# Patient Record
Sex: FEMALE | Race: WHITE | Employment: UNEMPLOYED | URBAN - METROPOLITAN AREA
[De-identification: names, ages, dates, MRNs, and addresses within clinical notes are randomized per-mention and may not be internally consistent; named-entity substitution may affect disease eponyms.]

---

## 2014-10-23 LAB — COLONOSCOPY, EXTERNAL: NORMAL

## 2017-01-01 DIAGNOSIS — I10 UNSPECIFIED ESSENTIAL HYPERTENSION: ICD-10-CM

## 2017-01-06 RX ORDER — HYDROCHLOROTHIAZIDE 25 MG/1
TABLET ORAL
Qty: 90 TAB | Refills: 0 | Status: SHIPPED | OUTPATIENT
Start: 2017-01-06 | End: 2017-04-06 | Stop reason: SDUPTHER

## 2017-01-06 RX ORDER — LOSARTAN POTASSIUM 50 MG/1
TABLET ORAL
Qty: 90 TAB | Refills: 0 | Status: SHIPPED | OUTPATIENT
Start: 2017-01-06 | End: 2017-04-06 | Stop reason: SDUPTHER

## 2017-02-08 ENCOUNTER — OFFICE VISIT (OUTPATIENT)
Dept: FAMILY MEDICINE CLINIC | Age: 62
End: 2017-02-08

## 2017-02-08 VITALS
HEIGHT: 66 IN | WEIGHT: 200.8 LBS | TEMPERATURE: 99.6 F | BODY MASS INDEX: 32.27 KG/M2 | OXYGEN SATURATION: 96 % | RESPIRATION RATE: 17 BRPM | HEART RATE: 74 BPM | SYSTOLIC BLOOD PRESSURE: 134 MMHG | DIASTOLIC BLOOD PRESSURE: 84 MMHG

## 2017-02-08 DIAGNOSIS — Z11.59 SCREENING FOR VIRAL DISEASE: ICD-10-CM

## 2017-02-08 DIAGNOSIS — R73.03 PREDIABETES: Primary | ICD-10-CM

## 2017-02-08 DIAGNOSIS — E66.9 OBESITY (BMI 30-39.9): ICD-10-CM

## 2017-02-08 DIAGNOSIS — G47.9 SLEEP DISTURBANCE: ICD-10-CM

## 2017-02-08 DIAGNOSIS — E78.5 HYPERLIPIDEMIA WITH TARGET LDL LESS THAN 130: ICD-10-CM

## 2017-02-08 RX ORDER — ALPRAZOLAM 0.25 MG/1
TABLET ORAL
Qty: 30 TAB | Refills: 0 | Status: SHIPPED | OUTPATIENT
Start: 2017-02-08 | End: 2017-09-18 | Stop reason: SDUPTHER

## 2017-02-08 RX ORDER — ZOSTER VACCINE LIVE 19400 [PFU]/.65ML
INJECTION, POWDER, LYOPHILIZED, FOR SUSPENSION SUBCUTANEOUS
Refills: 0 | COMMUNITY
Start: 2016-12-10 | End: 2017-06-15

## 2017-02-08 NOTE — PROGRESS NOTES
HISTORY OF PRESENT ILLNESS  Steve Da Silva is a 64 y.o. female. Blood pressure 134/84, pulse 74, temperature 99.6 °F (37.6 °C), temperature source Oral, resp. rate 17, height 5' 6\" (1.676 m), weight 200 lb 12.8 oz (91.1 kg), SpO2 96 %. Body mass index is 32.41 kg/(m^2). Chief Complaint   Patient presents with    Labs    Cholesterol Problem    Hypertension    Medication Refill     xanax requesting 30 tablets      HPI   Cephus Cooler 64 y.o. female  presents to the office today for a follow up on chronic conditions. Hypertension: Bp at office today 141/88, 134/84 with manual arm cuff recheck. Pt continues with Losartan 50 mg nightly and HCTZ 25 mg daily. Bp control stable, continue current regimen. Hyperlipidemia: Lipid panel on 09/20/16 notable for total cholesterol 246, , HDL 96, and triglycerides 75. Pt is not on any statins. LDL is elevated at 135 per labs on 09/20/16. Will reassess levels when pt returns for fasting labs next week. Prediabetes: Stable with last A1c 6.1% per labs on 09/20/16. Will reassess A1c when pt returns for fasting labs next week. Health maintenance: Counseled pt on diet, exercise, and to work on losing weight. Current Outpatient Prescriptions   Medication Sig Dispense Refill    ALPRAZolam (XANAX) 0.25 mg tablet take 1 tablet by mouth once daily if needed for anxiety 30 Tab 0    hydroCHLOROthiazide (HYDRODIURIL) 25 mg tablet TAKE 1 TABLET BY MOUTH EVERY DAY 90 Tab 0    losartan (COZAAR) 50 mg tablet TAKE 1 TABLET BY MOUTH EVERY EVENING 90 Tab 0    biotin 2,500 mcg tab Take  by mouth.  triamcinolone (NASACORT AQ) 55 mcg nasal inhaler 2 Sprays by Both Nostrils route daily.  aspirin 81 mg chewable tablet Take 1 Tab by mouth daily.  omeprazole (PRILOSEC OTC) 20 mg tablet Take 20 mg by mouth daily.  cyanocobalamin (VITAMIN B-12) 1,000 mcg tablet Take 1,000 mcg by mouth daily.         MULTIVITS-MIN/FA/CA CARB/VIT K (ONE-A-DAY WOMEN'S 50+ PO) Take 1 Tab by mouth daily.  loratadine (CLARITIN) 10 mg tablet Take 10 mg by mouth daily.  VARICELLA-ZOSTER VACINE LIVE 19,400 unit/0.65 mL susr injection TO BE ADMINISTERED BY PHARMACIST FOR IMMUNIZATION  0     No Known Allergies  Past Medical History   Diagnosis Date    Allergic rhinitis     Arrhythmia     Cancer ([address] 6/2010     right breast DCIS    Diverticulitis     Hypertension     Retinal tear of left eye 2/6/14     Past Surgical History   Procedure Laterality Date    Hx tonsillectomy      Hx tonsil and adenoidectomy      Hx breast lumpectomy  4/2010     right breast    Hx other surgical Left 2/6/14      Retinal tear repair by Dr. Marisa Mackenzie     Family History   Problem Relation Age of Onset    Other Mother      overweight, gout    Heart Disease Mother      chf    Hypertension Mother     Glaucoma Father     Diabetes Father     Hypertension Father     Osteoporosis Paternal Grandmother     Diabetes Paternal Aunt 36     breast cancer     Social History   Substance Use Topics    Smoking status: Never Smoker    Smokeless tobacco: Never Used    Alcohol use No        Review of Systems   Constitutional: Negative. Negative for malaise/fatigue. Eyes: Negative for blurred vision. Respiratory: Negative for shortness of breath. Cardiovascular: Negative for chest pain and leg swelling. Musculoskeletal: Negative. Neurological: Negative. Negative for dizziness and headaches. All other systems reviewed and are negative. Physical Exam   Constitutional: She is oriented to person, place, and time. She appears well-developed and well-nourished. No distress. HENT:   Head: Normocephalic and atraumatic. Neck: Carotid bruit is not present. Cardiovascular: Normal rate, regular rhythm, normal heart sounds and intact distal pulses. Exam reveals no gallop and no friction rub. No murmur heard.   Pulmonary/Chest: Effort normal and breath sounds normal. No respiratory distress. She has no wheezes. She has no rales. Musculoskeletal: She exhibits no edema. Neurological: She is alert and oriented to person, place, and time. Skin: She is not diaphoretic. Psychiatric: She has a normal mood and affect. Her behavior is normal. Judgment and thought content normal.   Nursing note and vitals reviewed. ASSESSMENT and PLAN  Solange Gracia was seen today for labs, cholesterol problem, hypertension and medication refill. Diagnoses and all orders for this visit:    Prediabetes  -     METABOLIC PANEL, COMPREHENSIVE  - Stable with last A1c 6.1% on 09/20/16. Will reassess A1c when pt returns for fasting labs. Sleep disturbance  -     ALPRAZolam (XANAX) 0.25 mg tablet; take 1 tablet by mouth once daily if needed for anxiety    Hyperlipidemia with target LDL less than 130  -     LIPID PANEL  -     HEMOGLOBIN A1C WITH EAG  - Presumed stable, will assess levels when pt returns for fasting labs next week. Screening for viral disease  -     HEPATITIS C AB    Obesity (BMI 30-39. 9)  I have reviewed/discussed the above normal BMI with the patient. I have recommended the following interventions: dietary management education, guidance, and counseling, dietary needs education, encourage exercise, feeding regime, lifestyle education regarding diet and monitor weight . The plan is as follows: I have counseled this patient on diet and exercise regimens. .        Follow-up Disposition:  Return in about 6 months (around 8/8/2017) for hypertension follow up, hyperlipidemia follow up. Medication risks/benefits/costs/interactions/alternatives discussed with patient. Advised patient to call back or return to office if symptoms worsen/change/persist.  If patient cannot reach us or should anything more severe/urgent arise he/she should proceed directly to the nearest emergency department. Discussed expected course/resolution/complications of diagnosis in detail with patient.   Patient given a written after visit summary which includes her diagnoses, current medications and vitals. Patient expressed understanding with the diagnosis and plan. Written by talia Mcpherson, as dictated by Xiomara Cole M.D.    I have reviewed and agree with the above note and have made corrections where appropriate, Dr. Chelsie Mueller MD

## 2017-02-08 NOTE — PROGRESS NOTES
Chief Complaint   Patient presents with    Labs    Cholesterol Problem    Hypertension    Medication Refill     xanax requesting 30 tablets     Identified pt with two pt identifiers (Name @ )    1. Have you been to the ER, urgent care clinic since your last visit? Hospitalized since your last visit? No    2. Have you seen or consulted any other health care providers outside of the [address] [place] [address] since your last visit? Include any pap smears or colon screening.  No     \"REVIEWED RECORD IN PREPARATION FOR VISIT AND HAVE OBTAINED NECESSARY DOCUMENTATION\"

## 2017-02-08 NOTE — MR AVS SNAPSHOT
Visit Information Date & Time Provider Department Dept. Phone Encounter #  
 2/8/2017  3:00 PM Princess Tripp  [facility] [address] [phone] [id number] Follow-up Instructions Return in about 6 months (around 8/8/2017) for hypertension follow up, hyperlipidemia follow up. Upcoming Health Maintenance Date Due Hepatitis C Screening 1955 Pneumococcal 19-64 Highest Risk (1 of 3 - PCV13) 2/14/1974 PAP AKA CERVICAL CYTOLOGY 3/12/2017 BREAST CANCER SCRN MAMMOGRAM 5/13/2018 COLONOSCOPY 10/23/2024 DTaP/Tdap/Td series (2 - Td) 1/29/2025 Allergies as of 2/8/2017  Review Complete On: 2/8/2017 By: Princess Tripp MD  
 No Known Allergies Current Immunizations  Reviewed on 2/8/2017 Name Date Influenza Vaccine 10/12/2015 Influenza Vaccine Tomer Me) 10/5/2016 Influenza Vaccine PF 10/22/2013 10:15 AM  
 Tdap 1/29/2015 11:10 AM  
 Zoster Vaccine, Live 12/10/2016 Reviewed by Gauri Alcantara LPN on 6/3/8666 at  1:50 PM  
 Reviewed by Gauri Alcantara LPN on 5/7/6071 at  7:78 PM  
You Were Diagnosed With   
  
 Codes Comments Prediabetes    -  Primary ICD-10-CM: R73.03 
ICD-9-CM: 790.29 Sleep disturbance     ICD-10-CM: G47.9 ICD-9-CM: 780.50 Hyperlipidemia with target LDL less than 130     ICD-10-CM: E78.5 ICD-9-CM: 272.4 Screening for viral disease     ICD-10-CM: Z11.59 
ICD-9-CM: V73.99 Obesity (BMI 30-39. 9)     ICD-10-CM: E66.9 ICD-9-CM: 278.00 Vitals BP Pulse Temp Resp Height(growth percentile) Weight(growth percentile) 134/84 74 99.6 °F (37.6 °C) (Oral) 17 5' 6\" (1.676 m) 200 lb 12.8 oz (91.1 kg) SpO2 BMI OB Status Smoking Status 96% 32.41 kg/m2 Postmenopausal Never Smoker Vitals History BMI and BSA Data Body Mass Index Body Surface Area  
 32.41 kg/m 2 2.06 m 2 Preferred Pharmacy Pharmacy Name Phone [id number]- [place], [address] [facility] [phone] Your Updated Medication List  
  
   
This list is accurate as of: 2/8/17  3:51 PM.  Always use your most recent med list.  
  
  
  
  
 ALPRAZolam 0.25 mg tablet Commonly known as:  XANAX  
take 1 tablet by mouth once daily if needed for anxiety  
  
 aspirin 81 mg chewable tablet Take 1 Tab by mouth daily. biotin 2,500 mcg Tab Take  by mouth. CLARITIN 10 mg tablet Generic drug:  loratadine Take 10 mg by mouth daily. hydroCHLOROthiazide 25 mg tablet Commonly known as:  HYDRODIURIL  
TAKE 1 TABLET BY MOUTH EVERY DAY  
  
 losartan 50 mg tablet Commonly known as:  COZAAR  
TAKE 1 TABLET BY MOUTH EVERY EVENING  
  
 NASACORT AQ 55 mcg nasal inhaler Generic drug:  triamcinolone 2 Sprays by Both Nostrils route daily. ONE-A-DAY WOMEN'S 50+ PO Take 1 Tab by mouth daily. PriLOSEC OTC 20 mg tablet Generic drug:  omeprazole Take 20 mg by mouth daily. varicella-zoster vacine live 19,400 unit/0.65 mL Susr injection Generic drug:  varicella zoster vacine live  
TO BE ADMINISTERED BY PHARMACIST FOR IMMUNIZATION  
  
 VITAMIN B-12 1,000 mcg tablet Generic drug:  cyanocobalamin Take 1,000 mcg by mouth daily. Prescriptions Printed Refills ALPRAZolam (XANAX) 0.25 mg tablet 0 Sig: take 1 tablet by mouth once daily if needed for anxiety Class: Print We Performed the Following HEMOGLOBIN A1C WITH EAG [37320 CPT(R)] HEPATITIS C AB [65762 CPT(R)] LIPID PANEL [08466 CPT(R)] METABOLIC PANEL, COMPREHENSIVE [79929 CPT(R)] Follow-up Instructions Return in about 6 months (around 8/8/2017) for hypertension follow up, hyperlipidemia follow up. Introducing [address]! Dear Mahendra Funes: Thank you for requesting a [employer] account.   Our records indicate that you already have an active [facility] account. You can access your account anytime at [email or web address] Did you know that you can access your hospital and ER discharge instructions at any time in [employer]MA? You can also review all of your test results from your hospital stay or ER visit. Additional Information If you have questions, please visit the Frequently Asked Questions section of the [employer] website at [email or web address] Remember, [facility] is NOT to be used for urgent needs. For medical emergencies, dial 911. Now available from your iPhone and Android! Please provide this summary of care documentation to your next provider. Your primary care clinician is listed as Selina Button. If you have any questions after today's visit, please call [phone].

## 2017-02-14 LAB
ALBUMIN SERPL-MCNC: 4.4 G/DL (ref 3.6–4.8)
ALBUMIN/GLOB SERPL: 1.8 {RATIO} (ref 1.1–2.5)
ALP SERPL-CCNC: 114 IU/L (ref 39–117)
ALT SERPL-CCNC: 23 IU/L (ref 0–32)
AST SERPL-CCNC: 22 IU/L (ref 0–40)
BILIRUB SERPL-MCNC: 0.4 MG/DL (ref 0–1.2)
BUN SERPL-MCNC: 18 MG/DL (ref 8–27)
BUN/CREAT SERPL: 22 (ref 11–26)
CALCIUM SERPL-MCNC: 9.7 MG/DL (ref 8.7–10.3)
CHLORIDE SERPL-SCNC: 99 MMOL/L (ref 96–106)
CHOLEST SERPL-MCNC: 231 MG/DL (ref 100–199)
CO2 SERPL-SCNC: 27 MMOL/L (ref 18–29)
CREAT SERPL-MCNC: 0.82 MG/DL (ref 0.57–1)
EST. AVERAGE GLUCOSE BLD GHB EST-MCNC: 128 MG/DL
GLOBULIN SER CALC-MCNC: 2.5 G/DL (ref 1.5–4.5)
GLUCOSE SERPL-MCNC: 99 MG/DL (ref 65–99)
HBA1C MFR BLD: 6.1 % (ref 4.8–5.6)
HCV AB S/CO SERPL IA: <0.1 S/CO RATIO (ref 0–0.9)
HDLC SERPL-MCNC: 81 MG/DL
INTERPRETATION, 910389: NORMAL
LDLC SERPL CALC-MCNC: 136 MG/DL (ref 0–99)
POTASSIUM SERPL-SCNC: 5.3 MMOL/L (ref 3.5–5.2)
PROT SERPL-MCNC: 6.9 G/DL (ref 6–8.5)
SODIUM SERPL-SCNC: 142 MMOL/L (ref 134–144)
TRIGL SERPL-MCNC: 69 MG/DL (ref 0–149)
VLDLC SERPL CALC-MCNC: 14 MG/DL (ref 5–40)

## 2017-02-16 NOTE — PROGRESS NOTES
Inform pt to go to my chart to see results and recommendations    Cholesterol and A1C are stable  Continue current regimen  Keep up the good work, an I'll see you as advised

## 2017-04-06 DIAGNOSIS — I10 UNSPECIFIED ESSENTIAL HYPERTENSION: ICD-10-CM

## 2017-04-08 RX ORDER — HYDROCHLOROTHIAZIDE 25 MG/1
TABLET ORAL
Qty: 90 TAB | Refills: 1 | Status: SHIPPED | OUTPATIENT
Start: 2017-04-08 | End: 2017-10-02 | Stop reason: SDUPTHER

## 2017-04-08 RX ORDER — LOSARTAN POTASSIUM 50 MG/1
TABLET ORAL
Qty: 90 TAB | Refills: 1 | Status: SHIPPED | OUTPATIENT
Start: 2017-04-08 | End: 2017-09-18 | Stop reason: SDUPTHER

## 2017-04-17 LAB
MAMMOGRAPHY, EXTERNAL: NORMAL
MAMMOGRAPHY, EXTERNAL: NORMAL
PAP SMEAR, EXTERNAL: NORMAL

## 2017-05-08 ENCOUNTER — HOSPITAL ENCOUNTER (OUTPATIENT)
Dept: MAMMOGRAPHY | Age: 62
Discharge: HOME | End: 2017-05-08
Attending: OBSTETRICS & GYNECOLOGY
Payer: COMMERCIAL

## 2017-05-08 DIAGNOSIS — Z85.3 HISTORY OF BREAST CANCER: ICD-10-CM

## 2017-05-08 DIAGNOSIS — N64.59 BREAST THICKENING: ICD-10-CM

## 2017-05-08 PROCEDURE — 77066 DX MAMMO INCL CAD BI: CPT

## 2017-06-15 ENCOUNTER — OFFICE VISIT (OUTPATIENT)
Dept: FAMILY MEDICINE CLINIC | Age: 62
End: 2017-06-15

## 2017-06-15 VITALS
RESPIRATION RATE: 15 BRPM | SYSTOLIC BLOOD PRESSURE: 130 MMHG | HEART RATE: 80 BPM | TEMPERATURE: 98.4 F | HEIGHT: 66 IN | WEIGHT: 198 LBS | OXYGEN SATURATION: 95 % | BODY MASS INDEX: 31.82 KG/M2 | DIASTOLIC BLOOD PRESSURE: 88 MMHG

## 2017-06-15 DIAGNOSIS — I49.9 IRREGULAR HEARTBEAT: ICD-10-CM

## 2017-06-15 DIAGNOSIS — J20.8 VIRAL BRONCHITIS: Primary | ICD-10-CM

## 2017-06-15 RX ORDER — AZITHROMYCIN 250 MG/1
TABLET, FILM COATED ORAL
Qty: 6 TAB | Refills: 0 | Status: SHIPPED | OUTPATIENT
Start: 2017-06-15 | End: 2017-06-20

## 2017-06-15 RX ORDER — HYDROCODONE POLISTIREX AND CHLORPHENIRAMINE POLISTIREX 10; 8 MG/5ML; MG/5ML
1 SUSPENSION, EXTENDED RELEASE ORAL
Qty: 120 ML | Refills: 0 | Status: SHIPPED | OUTPATIENT
Start: 2017-06-15 | End: 2017-09-18

## 2017-06-15 RX ORDER — BENZONATATE 100 MG/1
100 CAPSULE ORAL
Qty: 30 CAP | Refills: 0 | Status: SHIPPED | OUTPATIENT
Start: 2017-06-15 | End: 2017-06-25

## 2017-06-15 NOTE — PROGRESS NOTES
Chief Complaint   Patient presents with    Cough       1. Have you been to the ER, urgent care clinic since your last visit? Hospitalized since your last visit? No    2. Have you seen or consulted any other health care providers outside of the [address] [place] [address] since your last visit? Include any pap smears or colon screening. No    Body mass index is 31.96 kg/(m^2).

## 2017-06-15 NOTE — MR AVS SNAPSHOT
Visit Information Date & Time Provider Department Dept. Phone Encounter #  
 6/15/2017  3:30 PM Christiano Echeverria, [address] [facility] [phone] [id number] Your Appointments 8/9/2017  2:30 PM  
ROUTINE CARE with Leroy Byrne MD  
[facility]) Appt Note: 6 months follow up visit htn  
 [address] [address]  
[phone]  
  
   
 [address] [address] Upcoming Health Maintenance Date Due Pneumococcal 19-64 Highest Risk (1 of 3 - PCV13) 2/14/1974 PAP AKA CERVICAL CYTOLOGY 3/12/2017 INFLUENZA AGE 9 TO ADULT 8/1/2017 BREAST CANCER SCRN MAMMOGRAM 5/8/2019 COLONOSCOPY 10/23/2024 DTaP/Tdap/Td series (2 - Td) 1/29/2025 Allergies as of 6/15/2017  Review Complete On: 6/15/2017 By: Christiano Echeverria MD  
 No Known Allergies Current Immunizations  Reviewed on 2/8/2017 Name Date Influenza Vaccine 10/12/2015 Influenza Vaccine Iver Ren) 10/5/2016 Influenza Vaccine PF 10/22/2013 10:15 AM  
 Tdap 1/29/2015 11:10 AM  
 Zoster Vaccine, Live 12/10/2016 Not reviewed this visit You Were Diagnosed With   
  
 Codes Comments Viral bronchitis    -  Primary ICD-10-CM: J20.8 ICD-9-CM: 466.0 Irregular heartbeat     ICD-10-CM: I49.9 ICD-9-CM: 427.9 Vitals BP Pulse Temp Resp Height(growth percentile) Weight(growth percentile) 130/88 80 98.4 °F (36.9 °C) (Oral) 15 5' 6\" (1.676 m) 198 lb (89.8 kg) SpO2 BMI OB Status Smoking Status 95% 31.96 kg/m2 Postmenopausal Never Smoker Vitals History BMI and BSA Data Body Mass Index Body Surface Area 31.96 kg/m 2 2.04 m 2 Preferred Pharmacy Pharmacy Name Phone [id number] [place], [address] [phone] Your Updated Medication List  
  
   
 This list is accurate as of: 6/15/17  3:30 PM.  Always use your most recent med list.  
  
  
  
  
 ALPRAZolam 0.25 mg tablet Commonly known as:  XANAX  
take 1 tablet by mouth once daily if needed for anxiety  
  
 aspirin 81 mg chewable tablet Take 1 Tab by mouth daily. azithromycin 250 mg tablet Commonly known as:  Dilan Russ Take 2 tablets today, then take 1 tablet daily (expires 8/1/17) benzonatate 100 mg capsule Commonly known as:  TESSALON Take 1 Cap by mouth three (3) times daily as needed for Cough for up to 10 days. biotin 2,500 mcg Tab Take  by mouth. chlorpheniramine-HYDROcodone 10-8 mg/5 mL suspension Commonly known as:  Bro Martines Take 5 mL by mouth every twelve (12) hours as needed for Cough. Max Daily Amount: 10 mL. CLARITIN 10 mg tablet Generic drug:  loratadine Take 10 mg by mouth daily. hydroCHLOROthiazide 25 mg tablet Commonly known as:  HYDRODIURIL  
TAKE 1 TABLET BY MOUTH EVERY DAY  
  
 losartan 50 mg tablet Commonly known as:  COZAAR  
TAKE 1 TABLET BY MOUTH EVERY EVENING  
  
 NASACORT AQ 55 mcg nasal inhaler Generic drug:  triamcinolone 2 Sprays by Both Nostrils route daily. ONE-A-DAY WOMEN'S 50+ PO Take 1 Tab by mouth daily. PriLOSEC OTC 20 mg tablet Generic drug:  omeprazole Take 20 mg by mouth daily. VITAMIN B-12 1,000 mcg tablet Generic drug:  cyanocobalamin Take 1,000 mcg by mouth daily. Prescriptions Printed Refills  
 chlorpheniramine-HYDROcodone (TUSSIONEX) 10-8 mg/5 mL suspension 0 Sig: Take 5 mL by mouth every twelve (12) hours as needed for Cough. Max Daily Amount: 10 mL. Class: Print Route: Oral  
 azithromycin (ZITHROMAX) 250 mg tablet 0 Sig: Take 2 tablets today, then take 1 tablet daily (expires 8/1/17) Class: Print Prescriptions Sent to Pharmacy  Refills  
 benzonatate (TESSALON) 100 mg capsule 0  
 Sig: Take 1 Cap by mouth three (3) times daily as needed for Cough for up to 10 days. Class: Normal  
 Pharmacy[id number] [place], [address] [place] [address] Ph #: [phone] Route: Oral  
  
We Performed the Following AMB POC EKG ROUTINE W/ 12 LEADS, INTER & REP [72279 CPT(R)] Introducing [facility]! Dear Selina Amaral: Thank you for requesting a [employer] account. Our records indicate that you already have an active [employer] account. You can access your account anytime at [email or web address] Did you know that you can access your hospital and ER discharge instructions at any time in [employer] [employer]? You can also review all of your test results from your hospital stay or ER visit. Additional Information If you have questions, please visit the Frequently Asked Questions section of the [employer] website at [email or web address] Remember, [facility] is NOT to be used for urgent needs. For medical emergencies, dial 911. Now available from your iPhone and Android! Please provide this summary of care documentation to your next provider. Your primary care clinician is listed as Altagracia Ambrocio. If you have any questions after today's visit, please call [phone].

## 2017-06-15 NOTE — PROGRESS NOTES
Alex Rose [address] [facility]  [address]. [facility], [address]  [phone]             Date of visit: 6/15/2017   Subjective:      History obtained from:  the patient. Stephanie Araujo is a 58 y.o. female who presents today for cold last week and still has the cough, it won't go away, keeps her up at night    This AM was short of breath when getting dressed  Feeling hot, tired      Sometimes coughs up stuff  Not blowing much out nose  Did have a copule of days ago headache and sinus pain    This is all unusual for her, has not had a cold in years nad doesn't get coughs like this  No history of asthma or lung problems or smoke exposure    Works at a [employer] to [employer] Saturday for next week    Patient Active Problem List    Diagnosis Date Noted    Prediabetes 10/05/2016    Sleep disturbance 01/29/2015    Obesity (BMI 30-39.9) 01/29/2015    Breast neoplasm, Tis (LCIS) 06/05/2014    DCIS (ductal carcinoma in situ) of breast 06/05/2014    Use of tamoxifen (Nolvadex) 06/05/2014    S/P breast lumpectomy 06/05/2013    GERD (gastroesophageal reflux disease) 03/19/2013    Unspecified essential hypertension 11/19/2010    Breast cancer ([address] 07/10/2010    Allergic rhinitis 03/31/2010    Hyperlipidemia with target LDL less than 130 03/31/2010    Family history of osteoporosis 03/31/2010     Current Outpatient Prescriptions   Medication Sig Dispense Refill    chlorpheniramine-HYDROcodone (TUSSIONEX) 10-8 mg/5 mL suspension Take 5 mL by mouth every twelve (12) hours as needed for Cough. Max Daily Amount: 10 mL. 120 mL 0    benzonatate (TESSALON) 100 mg capsule Take 1 Cap by mouth three (3) times daily as needed for Cough for up to 10 days.  30 Cap 0    azithromycin (ZITHROMAX) 250 mg tablet Take 2 tablets today, then take 1 tablet daily (expires 8/1/17) 6 Tab 0    hydroCHLOROthiazide (HYDRODIURIL) 25 mg tablet TAKE 1 TABLET BY MOUTH EVERY DAY 90 Tab 1    losartan (COZAAR) 50 mg tablet TAKE 1 TABLET BY MOUTH EVERY EVENING 90 Tab 1    ALPRAZolam (XANAX) 0.25 mg tablet take 1 tablet by mouth once daily if needed for anxiety 30 Tab 0    biotin 2,500 mcg tab Take  by mouth.  triamcinolone (NASACORT AQ) 55 mcg nasal inhaler 2 Sprays by Both Nostrils route daily.  aspirin 81 mg chewable tablet Take 1 Tab by mouth daily.  omeprazole (PRILOSEC OTC) 20 mg tablet Take 20 mg by mouth daily.  cyanocobalamin (VITAMIN B-12) 1,000 mcg tablet Take 1,000 mcg by mouth daily.  MULTIVITS-MIN/FA/CA CARB/VIT K (ONE-A-DAY WOMEN'S 50+ PO) Take 1 Tab by mouth daily.  loratadine (CLARITIN) 10 mg tablet Take 10 mg by mouth daily.        No Known Allergies  Past Medical History:   Diagnosis Date    Allergic rhinitis     Arrhythmia     Breast [address] [address] 2010    right breast    Cancer ([facility] [address] [facility] 6/2010    right breast DCIS    Diverticulitis     Hypertension     Retinal tear of left eye 2/6/14     Past Surgical History:   Procedure Laterality Date    HX BREAST BIOPSY Bilateral 1970 70's and 2010 benign (surgical)    HX BREAST BIOPSY Left 05/2010    benign mri bx    HX BREAST BIOPSY Right 03/2010    positive stereo    HX BREAST LUMPECTOMY  4/2010    right breast    HX OTHER SURGICAL Left 2/6/14     Retinal tear repair by Dr. Briseida Gillespie HX TONSILLECTOMY       Family History   Problem Relation Age of Onset    Other Mother      overweight, gout    Heart Disease Mother      chf    Hypertension Mother     Glaucoma Father     Diabetes Father     Hypertension Father     Osteoporosis Paternal Grandmother     Diabetes Paternal Aunt 36     breast cancer     Social History   Substance Use Topics    Smoking status: Never Smoker    Smokeless tobacco: Never Used    Alcohol use No      Social History     Social History Narrative        Review of Systems  Card: denies chest pain  Gen: denies fevers     Objective:     Vitals:    06/15/17 1512 06/15/17 1526   BP: (!) 151/103 130/88   Pulse: 80    Resp: 15    Temp: 98.4 °F (36.9 °C)    TempSrc: Oral    SpO2: 95%    Weight: 198 lb (89.8 kg)    Height: 5' 6\" (1.676 m)      Body mass index is 31.96 kg/(m^2). General: stated age, obese and in NAD  Nose: no drainage  Throat: no redness or exudate  Neck: supple, symmetrical, trachea midline, no adenopathy and thyroid: not enlarged, symmetric, no tenderness/mass/nodules  Lungs:  clear to auscultation w/o rales, rhonchi, wheezes w/normal effort and no use of accessory muscles of respiration   Heart: irregularly irregular but not fast, S1, S2 normal, no murmur, click, rub or gallop  Abdomen: soft, nontender, no masses, BS normal  Ext:  No edema noted. Lymph: no cervical adenopathy appreciated  Skin:  Normal. and no rash or abnormalities   Psych: alert and oriented to person, place, time and situation and Speech: appropriate quality, quantity and organization of sentences    EKG: SR, few PVCs, no prior to compare    Assessment/Plan:       ICD-10-CM ICD-9-CM    1. Viral bronchitis J20.8 466.0    2. Irregular heartbeat I49.9 427.9 AMB POC EKG ROUTINE W/ 12 LEADS, INTER & REP        Orders Placed This Encounter    AMB POC EKG ROUTINE W/ 12 LEADS, INTER & REP    chlorpheniramine-HYDROcodone (TUSSIONEX) 10-8 mg/5 mL suspension    benzonatate (TESSALON) 100 mg capsule    azithromycin (ZITHROMAX) 250 mg tablet       Seems to be viral bronchitis, explained abx won't help  However, will give her printed rx for zpack to take on her vacation next week and should take it if sinus symptoms worsen again or she gets a fever  For now, try tessalon, tussionex prn  EKG reassuring, just PVCs like she has had in the past (wanted to rule out a-fib)    Discussed the diagnosis and plan and she expressed understanding.     F/u prn    Guero Owen MD

## 2017-09-18 ENCOUNTER — OFFICE VISIT (OUTPATIENT)
Dept: FAMILY MEDICINE CLINIC | Age: 62
End: 2017-09-18

## 2017-09-18 VITALS
SYSTOLIC BLOOD PRESSURE: 146 MMHG | RESPIRATION RATE: 16 BRPM | TEMPERATURE: 97.2 F | HEIGHT: 66 IN | HEART RATE: 60 BPM | WEIGHT: 204 LBS | BODY MASS INDEX: 32.78 KG/M2 | DIASTOLIC BLOOD PRESSURE: 90 MMHG | OXYGEN SATURATION: 98 %

## 2017-09-18 DIAGNOSIS — I10 UNSPECIFIED ESSENTIAL HYPERTENSION: Primary | ICD-10-CM

## 2017-09-18 DIAGNOSIS — R73.03 PREDIABETES: ICD-10-CM

## 2017-09-18 DIAGNOSIS — G47.9 SLEEP DISTURBANCE: ICD-10-CM

## 2017-09-18 LAB — HBA1C MFR BLD HPLC: 5.9 %

## 2017-09-18 RX ORDER — LOSARTAN POTASSIUM 50 MG/1
100 TABLET ORAL DAILY
Qty: 90 TAB | Refills: 1
Start: 2017-09-18 | End: 2017-11-30 | Stop reason: SDUPTHER

## 2017-09-18 RX ORDER — ALPRAZOLAM 0.25 MG/1
TABLET ORAL
Qty: 30 TAB | Refills: 0 | Status: CANCELLED | OUTPATIENT
Start: 2017-09-18

## 2017-09-18 RX ORDER — ALPRAZOLAM 0.25 MG/1
TABLET ORAL
Qty: 30 TAB | Refills: 0 | Status: SHIPPED | OUTPATIENT
Start: 2017-09-18 | End: 2018-10-24 | Stop reason: SDUPTHER

## 2017-09-18 NOTE — PROGRESS NOTES
HISTORY OF PRESENT ILLNESS  Aysha Kim is a 58 y.o. female. Blood pressure 146/90, pulse 60, temperature 97.2 °F (36.2 °C), temperature source Oral, resp. rate 16, height 5' 6\" (1.676 m), weight 204 lb (92.5 kg), SpO2 98 %. Body mass index is 32.93 kg/(m^2). Chief Complaint   Patient presents with    Hypertension        HPI  Aysha Kim 58 y.o. female  presents to the office today for hypertension follow up. Hypertension: Bp at office today 146/88-90 by manual arm cuff recheck. Pt continues with HCTZ 25mg daily and Losartan 50mg daily. Pt reports that she has not bee checking her bp at home. Pt denies headaches, dizziness, chest pains, or shortness of breath. Pt states that she has been taking her medications regularly. I advised pt that her bp is not at goal. I will increase pt's dosage of Losartan to 100mg daily. I advised pt to try and get 8000 steps a day and to monitor her bp regularly. Hyperlipidemia: Lipid panel on 02/13/17 notable for total cholesterol 231, , HDL 81, and triglycerides 69. Pt continues with diet and exercise and is not taking any statins. Prediabetes: A1C per POC today was 6.4% elevated from A1C on 02/13/17 of 6.1%. Will continue to monitor. Advised pt to try and exercise more. Sleep Disturbance: Pt reports that she only takes the Alprazolam 0.25mg when she really needs it. Health maintenance: Pt reports that she had her influenza vaccine this past weekend. Pt states that she had her pap smear on 04/17/17 with Dr. Onur Mcdaniel (OBGYN) and a mammogram in May 2017. Current Outpatient Prescriptions   Medication Sig Dispense Refill    ALPRAZolam (XANAX) 0.25 mg tablet take 1 tablet by mouth once daily if needed for anxiety 30 Tab 0    losartan (COZAAR) 50 mg tablet Take 2 Tabs by mouth daily.  Indications: hypertension 90 Tab 1    hydroCHLOROthiazide (HYDRODIURIL) 25 mg tablet TAKE 1 TABLET BY MOUTH EVERY DAY 90 Tab 1    biotin 2,500 mcg tab Take by mouth.  triamcinolone (NASACORT AQ) 55 mcg nasal inhaler 2 Sprays by Both Nostrils route daily.  aspirin 81 mg chewable tablet Take 1 Tab by mouth daily.  omeprazole (PRILOSEC OTC) 20 mg tablet Take 20 mg by mouth daily.  cyanocobalamin (VITAMIN B-12) 1,000 mcg tablet Take 1,000 mcg by mouth daily.  MULTIVITS-MIN/FA/CA CARB/VIT K (ONE-A-DAY WOMEN'S 50+ PO) Take 1 Tab by mouth daily.  loratadine (CLARITIN) 10 mg tablet Take 10 mg by mouth daily. No Known Allergies  Past Medical History:   Diagnosis Date    Allergic rhinitis     Arrhythmia     Breast cancer ([facility] 2010    right breast    Cancer ([facility] 6/2010    right breast DCIS    Diverticulitis     Hypertension     Retinal tear of left eye 2/6/14     Past Surgical History:   Procedure Laterality Date    HX BREAST BIOPSY Bilateral 1970 70's and 2010 benign (surgical)    HX BREAST BIOPSY Left 05/2010    benign mri bx    HX BREAST BIOPSY Right 03/2010    positive stereo    HX BREAST LUMPECTOMY  4/2010    right breast    HX OTHER SURGICAL Left 2/6/14     Retinal tear repair by Dr. Yaron Brasher HX TONSILLECTOMY       Family History   Problem Relation Age of Onset    Other Mother      overweight, gout    Heart Disease Mother      chf    Hypertension Mother     Glaucoma Father     Diabetes Father     Hypertension Father     Osteoporosis Paternal Grandmother     Diabetes Paternal Aunt 36     breast cancer     Social History   Substance Use Topics    Smoking status: Never Smoker    Smokeless tobacco: Never Used    Alcohol use No        Review of Systems   Constitutional: Negative. Negative for malaise/fatigue. Eyes: Negative for blurred vision. Respiratory: Negative for shortness of breath. Cardiovascular: Negative for chest pain and leg swelling. Musculoskeletal: Negative. Neurological: Negative. Negative for dizziness and headaches.    All other systems reviewed and are negative. Physical Exam   Constitutional: She is oriented to person, place, and time. She appears well-developed and well-nourished. No distress. HENT:   Head: Normocephalic and atraumatic. Neck: Carotid bruit is not present. Cardiovascular: Normal rate, regular rhythm, normal heart sounds and intact distal pulses. Exam reveals no gallop and no friction rub. No murmur heard. Pulmonary/Chest: Effort normal and breath sounds normal. No respiratory distress. She has no wheezes. She has no rales. Musculoskeletal: She exhibits no edema. Neurological: She is alert and oriented to person, place, and time. Skin: She is not diaphoretic. Psychiatric: She has a normal mood and affect. Her behavior is normal. Judgment and thought content normal.   Nursing note and vitals reviewed. ASSESSMENT and PLAN  Diagnoses and all orders for this visit:    1. Unspecified essential hypertension  -     losartan (COZAAR) 50 mg tablet; Take 2 Tabs by mouth daily. Indications: hypertension  - I advised pt that her bp is not at goal.   - I will increase pt's dosage of Losartan to 100mg daily.   - I advised pt to try and get 8000 steps a day and to monitor her bp regularly. 2. Sleep disturbance  -     ALPRAZolam (XANAX) 0.25 mg tablet; take 1 tablet by mouth once daily if needed for anxiety  - Advised pt to only take PRN. 3. Prediabetes  -     AMB POC HEMOGLOBIN A1C        - A1C per POC today was 6.4% elevated from A1C on 02/13/17 of 6.1%. - Will continue to monitor. Advised pt to try and exercise more. Follow-up Disposition:  Return in about 1 month (around 10/18/2017) for hypertension follow up. Medication risks/benefits/costs/interactions/alternatives discussed with patient.   Advised patient to call back or return to office if symptoms worsen/change/persist.  If patient cannot reach us or should anything more severe/urgent arise he/she should proceed directly to the nearest emergency department. Discussed expected course/resolution/complications of diagnosis in detail with patient. Patient given a written after visit summary which includes her diagnoses, current medications and vitals. Patient expressed understanding with the diagnosis and plan.   Written by talia Smith, as dictated by, Dr. Santhosh Colindres MD.   I have reviewed and agree with the above note and have made corrections where appropriate, Dr. Santhosh Colindres MD

## 2017-09-18 NOTE — PROGRESS NOTES
Chief Complaint   Patient presents with    Hypertension     States had flu vaccine at [facility] in [place]. Reviewed Record in preparation for visit and have obtained necessary documentation. Identified pt with two pt identifiers (Name @ )    Health Maintenance Due   Topic    Pneumococcal 19-64 Highest Risk (1 of 3 - PCV13)    PAP AKA CERVICAL CYTOLOGY     INFLUENZA AGE 9 TO ADULT          1. Have you been to the ER, urgent care clinic since your last visit? Hospitalized since your last visit? No    2. Have you seen or consulted any other health care providers outside of the [address] [place] [address] since your last visit? Include any pap smears or colon screening.  No

## 2017-10-01 RX ORDER — LOSARTAN POTASSIUM 50 MG/1
TABLET ORAL
Qty: 90 TAB | Refills: 1 | Status: SHIPPED | OUTPATIENT
Start: 2017-10-01 | End: 2017-11-30 | Stop reason: SDUPTHER

## 2017-10-02 DIAGNOSIS — I10 ESSENTIAL HYPERTENSION: ICD-10-CM

## 2017-10-02 RX ORDER — HYDROCHLOROTHIAZIDE 25 MG/1
TABLET ORAL
Qty: 90 TAB | Refills: 1 | Status: SHIPPED | OUTPATIENT
Start: 2017-10-02 | End: 2018-03-26 | Stop reason: SDUPTHER

## 2017-10-16 ENCOUNTER — OFFICE VISIT (OUTPATIENT)
Dept: FAMILY MEDICINE CLINIC | Age: 62
End: 2017-10-16

## 2017-10-16 VITALS
TEMPERATURE: 97.9 F | HEART RATE: 60 BPM | RESPIRATION RATE: 16 BRPM | HEIGHT: 66 IN | SYSTOLIC BLOOD PRESSURE: 136 MMHG | OXYGEN SATURATION: 98 % | WEIGHT: 200.2 LBS | DIASTOLIC BLOOD PRESSURE: 80 MMHG | BODY MASS INDEX: 32.17 KG/M2

## 2017-10-16 DIAGNOSIS — E66.9 OBESITY, CLASS I, BMI 30-34.9: ICD-10-CM

## 2017-10-16 DIAGNOSIS — C50.211 MALIGNANT NEOPLASM OF UPPER-INNER QUADRANT OF RIGHT FEMALE BREAST, UNSPECIFIED ESTROGEN RECEPTOR STATUS (HCC): ICD-10-CM

## 2017-10-16 DIAGNOSIS — R73.03 PREDIABETES: ICD-10-CM

## 2017-10-16 DIAGNOSIS — I10 ESSENTIAL HYPERTENSION: Primary | ICD-10-CM

## 2017-10-16 DIAGNOSIS — E78.5 HYPERLIPIDEMIA WITH TARGET LDL LESS THAN 130: ICD-10-CM

## 2017-10-16 NOTE — ASSESSMENT & PLAN NOTE
Stable, based on history, physical exam and review of pertinent labs, studies and medications; meds reconciled; pt has completed treatment. Key Oncology Meds     Patient is on no Oncologic meds. Key Pain Meds     The patient is on no pain meds. Lab Results   Component Value Date/Time    WBC 10.3 09/20/2016 10:59 AM    ABS.  NEUTROPHILS 6.5 09/20/2016 10:59 AM    HGB 13.8 09/20/2016 10:59 AM    HCT 41.0 09/20/2016 10:59 AM    PLATELET 640 57/14/6682 10:59 AM    Creatinine 0.82 02/13/2017 08:13 AM    BUN 18 02/13/2017 08:13 AM    ALT (SGPT) 23 02/13/2017 08:13 AM    AST (SGOT) 22 02/13/2017 08:13 AM    Albumin 4.4 02/13/2017 08:13 AM

## 2017-10-16 NOTE — PATIENT INSTRUCTIONS
Body Mass Index: Care Instructions  Your Care Instructions    Body mass index (BMI) can help you see if your weight is raising your risk for health problems. It uses a formula to compare how much you weigh with how tall you are. · A BMI lower than 18.5 is considered underweight. · A BMI between 18.5 and 24.9 is considered healthy. · A BMI between 25 and 29.9 is considered overweight. A BMI of 30 or higher is considered obese. If your BMI is in the normal range, it means that you have a lower risk for weight-related health problems. If your BMI is in the overweight or obese range, you may be at increased risk for weight-related health problems, such as high blood pressure, heart disease, stroke, arthritis or joint pain, and diabetes. If your BMI is in the underweight range, you may be at increased risk for health problems such as fatigue, lower protection (immunity) against illness, muscle loss, bone loss, hair loss, and hormone problems. BMI is just one measure of your risk for weight-related health problems. You may be at higher risk for health problems if you are not active, you eat an unhealthy diet, or you drink too much alcohol or use tobacco products. Follow-up care is a key part of your treatment and safety. Be sure to make and go to all appointments, and call your doctor if you are having problems. It's also a good idea to know your test results and keep a list of the medicines you take. How can you care for yourself at home? · Practice healthy eating habits. This includes eating plenty of fruits, vegetables, whole grains, lean protein, and low-fat dairy. · If your doctor recommends it, get more exercise. Walking is a good choice. Bit by bit, increase the amount you walk every day. Try for at least 30 minutes on most days of the week. · Do not smoke. Smoking can increase your risk for health problems. If you need help quitting, talk to your doctor about stop-smoking programs and medicines. These can increase your chances of quitting for good. · Limit alcohol to 2 drinks a day for men and 1 drink a day for women. Too much alcohol can cause health problems. If you have a BMI higher than 25  · Your doctor may do other tests to check your risk for weight-related health problems. This may include measuring the distance around your waist. A waist measurement of more than 40 inches in men or 35 inches in women can increase the risk of weight-related health problems. · Talk with your doctor about steps you can take to stay healthy or improve your health. You may need to make lifestyle changes to lose weight and stay healthy, such as changing your diet and getting regular exercise. If you have a BMI lower than 18.5  · Your doctor may do other tests to check your risk for health problems. · Talk with your doctor about steps you can take to stay healthy or improve your health. You may need to make lifestyle changes to gain or maintain weight and stay healthy, such as getting more healthy foods in your diet and doing exercises to build muscle. Where can you learn more? Go to http://brandi-lise.info/. Enter S176 in the search box to learn more about \"Body Mass Index: Care Instructions. \"  Current as of: January 23, 2017  Content Version: 11.3  © 1965-6604 [employer]. Care instructions adapted under license by [employer] (which disclaims liability or warranty for this information). If you have questions about a medical condition or this instruction, always ask your healthcare professional. [place] [address] any warranty or liability for your use of this information.

## 2017-10-16 NOTE — MR AVS SNAPSHOT
Visit Information Date & Time Provider Department Dept. Phone Encounter #  
 10/16/2017  2:45 PM Abhishek Nicole  [facility] [phone] [id number] Follow-up Instructions Return in about 6 months (around 4/16/2018) for hypertension follow up. Upcoming Health Maintenance Date Due Pneumococcal 19-64 Highest Risk (1 of 3 - PCV13) 10/16/2020* BREAST CANCER SCRN MAMMOGRAM 5/8/2019 PAP AKA CERVICAL CYTOLOGY 4/17/2020 COLONOSCOPY 10/23/2024 DTaP/Tdap/Td series (2 - Td) 1/29/2025 *Topic was postponed. The date shown is not the original due date. Allergies as of 10/16/2017  Review Complete On: 10/16/2017 By: Abhishek Nicole MD  
 No Known Allergies Current Immunizations  Reviewed on 2/8/2017 Name Date Influenza Vaccine 9/16/2017, 10/12/2015 Influenza Vaccine Warren Niels) 10/5/2016 Influenza Vaccine PF 10/22/2013 10:15 AM  
 Tdap 1/29/2015 11:10 AM  
 Zoster Vaccine, Live 12/10/2016 Not reviewed this visit You Were Diagnosed With   
  
 Codes Comments Essential hypertension    -  Primary ICD-10-CM: I10 
ICD-9-CM: 401.9 Hyperlipidemia with target LDL less than 130     ICD-10-CM: E78.5 ICD-9-CM: 272.4 Prediabetes     ICD-10-CM: R73.03 
ICD-9-CM: 790.29 Malignant neoplasm of upper-inner quadrant of right female breast, unspecified [facility]     ICD-10-CM: T74.056 ICD-9-CM: 174.2 Obesity, Class I, BMI 30-34.9     ICD-10-CM: E66.9 ICD-9-CM: 278.00 Vitals BP Pulse Temp Resp Height(growth percentile) Weight(growth percentile) 136/80 60 97.9 °F (36.6 °C) (Oral) 16 5' 6\" (1.676 m) 200 lb 3.2 oz (90.8 kg) SpO2 BMI OB Status Smoking Status 98% 32.31 kg/m2 Postmenopausal Never Smoker Vitals History BMI and BSA Data Body Mass Index Body Surface Area  
 32.31 kg/m 2 2.06 m 2 Preferred Pharmacy Pharmacy Name Phone [id number]- [facility]ah GARCIA [facility] [phone] Your Updated Medication List  
  
   
This list is accurate as of: 10/16/17  3:39 PM.  Always use your most recent med list.  
  
  
  
  
 ALPRAZolam 0.25 mg tablet Commonly known as:  XANAX  
take 1 tablet by mouth once daily if needed for anxiety  
  
 aspirin 81 mg chewable tablet Take 1 Tab by mouth daily. biotin 2,500 mcg Tab Take  by mouth. CLARITIN 10 mg tablet Generic drug:  loratadine Take 10 mg by mouth daily. FLUARIX QUAD [id number] (PF) Syrg injection Generic drug:  influenza vaccine 2017-18 (3 yrs+)(PF)  
TO BE ADMINISTERED BY PHARMACIST FOR IMMUNIZATION  
  
 hydroCHLOROthiazide 25 mg tablet Commonly known as:  HYDRODIURIL  
TAKE 1 TABLET BY MOUTH EVERY DAY  
  
 * losartan 50 mg tablet Commonly known as:  COZAAR Take 2 Tabs by mouth daily. Indications: hypertension * losartan 50 mg tablet Commonly known as:  COZAAR  
TAKE 1 TABLET BY MOUTH EVERY EVENING  
  
 NASACORT AQ 55 mcg nasal inhaler Generic drug:  triamcinolone 2 Sprays by Both Nostrils route daily. ONE-A-DAY WOMEN'S 50+ PO Take 1 Tab by mouth daily. PriLOSEC OTC 20 mg tablet Generic drug:  omeprazole Take 20 mg by mouth daily. VITAMIN B-12 1,000 mcg tablet Generic drug:  cyanocobalamin Take 1,000 mcg by mouth daily. * Notice: This list has 2 medication(s) that are the same as other medications prescribed for you. Read the directions carefully, and ask your doctor or other care provider to review them with you. We Performed the Following HEMOGLOBIN A1C WITH EAG [88450 CPT(R)] LIPID PANEL [68257 CPT(R)] METABOLIC PANEL, BASIC [96802 CPT(R)] Follow-up Instructions Return in about 6 months (around 4/16/2018) for hypertension follow up. Patient Instructions Body Mass Index: Care Instructions Your Care Instructions Body mass index (BMI) can help you see if your weight is raising your risk for health problems. It uses a formula to compare how much you weigh with how tall you are. · A BMI lower than 18.5 is considered underweight. · A BMI between 18.5 and 24.9 is considered healthy. · A BMI between 25 and 29.9 is considered overweight. A BMI of 30 or higher is considered obese. If your BMI is in the normal range, it means that you have a lower risk for weight-related health problems. If your BMI is in the overweight or obese range, you may be at increased risk for weight-related health problems, such as high blood pressure, heart disease, stroke, arthritis or joint pain, and diabetes. If your BMI is in the underweight range, you may be at increased risk for health problems such as fatigue, lower protection (immunity) against illness, muscle loss, bone loss, hair loss, and hormone problems. BMI is just one measure of your risk for weight-related health problems. You may be at higher risk for health problems if you are not active, you eat an unhealthy diet, or you drink too much alcohol or use tobacco products. Follow-up care is a key part of your treatment and safety. Be sure to make and go to all appointments, and call your doctor if you are having problems. It's also a good idea to know your test results and keep a list of the medicines you take. How can you care for yourself at home? · Practice healthy eating habits. This includes eating plenty of fruits, vegetables, whole grains, lean protein, and low-fat dairy. · If your doctor recommends it, get more exercise. Walking is a good choice. Bit by bit, increase the amount you walk every day. Try for at least 30 minutes on most days of the week. · Do not smoke. Smoking can increase your risk for health problems.  If you need help quitting, talk to your doctor about stop-smoking programs and medicines. These can increase your chances of quitting for good. · Limit alcohol to 2 drinks a day for men and 1 drink a day for women. Too much alcohol can cause health problems. If you have a BMI higher than 25 · Your doctor may do other tests to check your risk for weight-related health problems. This may include measuring the distance around your waist. A waist measurement of more than 40 inches in men or 35 inches in women can increase the risk of weight-related health problems. · Talk with your doctor about steps you can take to stay healthy or improve your health. You may need to make lifestyle changes to lose weight and stay healthy, such as changing your diet and getting regular exercise. If you have a BMI lower than 18.5 · Your doctor may do other tests to check your risk for health problems. · Talk with your doctor about steps you can take to stay healthy or improve your health. You may need to make lifestyle changes to gain or maintain weight and stay healthy, such as getting more healthy foods in your diet and doing exercises to build muscle. Where can you learn more? Go to http://brandi-lise.info/. Enter S176 in the search box to learn more about \"Body Mass Index: Care Instructions. \" Current as of: January 23, 2017 Content Version: 11.3 © 4911-0214 [employer]. Care instructions adapted under license by [employer] (which disclaims liability or warranty for this information). If you have questions about a medical condition or this instruction, always ask your healthcare professional. [address] any warranty or liability for your use of this information. Introducing [facility]! Dear Charli Barron: Thank you for requesting a [employer] account. Our records indicate that you already have an active [employer] account. You can access your account anytime at [email or web address] Did you know that you can access your hospital and ER discharge instructions at any time in [employer]? You can also review all of your test results from your hospital stay or ER visit. Additional Information If you have questions, please visit the Frequently Asked Questions section of the [employer] website at [email or web address] Remember, [employer] is NOT to be used for urgent needs. For medical emergencies, dial 911. Now available from your iPhone and Android! Please provide this summary of care documentation to your next provider. Your primary care clinician is listed as Ryan Vuong. If you have any questions after today's visit, please call [phone].

## 2017-10-16 NOTE — PROGRESS NOTES
HISTORY OF PRESENT ILLNESS  Caridad Madrigal is a 58 y.o. female. Blood pressure 136/80, pulse 60, temperature 97.9 °F (36.6 °C), temperature source Oral, resp. rate 16, height 5' 6\" (1.676 m), weight 200 lb 3.2 oz (90.8 kg), SpO2 98 %. Body mass index is 32.31 kg/(m^2). Chief Complaint   Patient presents with    Hypertension     1 month follow up . HPI  Caridad Madrigal 58 y.o. female  presents to the office today for follow up on hypertension. Hypertension: Bp at office today 130/78 by manual arm cuff recheck. Pt continues with Hydrochlorothiazide 25mg daily, Losartan 50mg 2 tablets BID. Presumed stable, will assess levels today. Hyperlipidemia: Lipid panel on 02/13/17 notable for total cholesterol 231, , HDL 81, and triglycerides 69. Pt continues with diet and exercise and is not taking any statins. Presumed stable, will assess levels today. Prediabetes: A1c was 5.9% on 09/18/17 Advised pt to continue to monitor. I also advised pt to continue to diet and exercise. Presumed stable, will assess levels today. Health maintenance: I advised pt that we will wait 3 years for her PNA vaccines. I advised pt to continue to work on her exercise and diet. I advised pt that I can refer her to bariatric medicine if she would like. Current Outpatient Prescriptions   Medication Sig Dispense Refill    hydroCHLOROthiazide (HYDRODIURIL) 25 mg tablet TAKE 1 TABLET BY MOUTH EVERY DAY 90 Tab 1    ALPRAZolam (XANAX) 0.25 mg tablet take 1 tablet by mouth once daily if needed for anxiety 30 Tab 0    losartan (COZAAR) 50 mg tablet Take 2 Tabs by mouth daily. Indications: hypertension 90 Tab 1    biotin 2,500 mcg tab Take  by mouth.  triamcinolone (NASACORT AQ) 55 mcg nasal inhaler 2 Sprays by Both Nostrils route daily.  aspirin 81 mg chewable tablet Take 1 Tab by mouth daily.  omeprazole (PRILOSEC OTC) 20 mg tablet Take 20 mg by mouth daily.       cyanocobalamin (VITAMIN B-12) 1,000 mcg tablet Take 1,000 mcg by mouth daily.  MULTIVITS-MIN/FA/CA CARB/VIT K (ONE-A-DAY WOMEN'S 50+ PO) Take 1 Tab by mouth daily.  loratadine (CLARITIN) 10 mg tablet Take 10 mg by mouth daily.  losartan (COZAAR) 50 mg tablet TAKE 1 TABLET BY MOUTH EVERY EVENING 90 Tab 1    FLUARIX QUAD 4218-5962, PF, syrg injection TO BE ADMINISTERED BY PHARMACIST FOR IMMUNIZATION  0     No Known Allergies  Past Medical History:   Diagnosis Date    Allergic rhinitis     Arrhythmia     Breast cancer ([address] 2010    right breast    Cancer ([address] 6/2010    right breast DCIS    Diverticulitis     Hypertension     Retinal tear of left eye 2/6/14     Past Surgical History:   Procedure Laterality Date    HX BREAST BIOPSY Bilateral 1970 70's and 2010 benign (surgical)    HX BREAST BIOPSY Left 05/2010    benign mri bx    HX BREAST BIOPSY Right 03/2010    positive stereo    HX BREAST LUMPECTOMY  4/2010    right breast    HX OTHER SURGICAL Left 2/6/14     Retinal tear repair by Dr. Aparna Rosenbaum HX TONSILLECTOMY       Family History   Problem Relation Age of Onset    Other Mother      overweight, gout    Heart Disease Mother      chf    Hypertension Mother     Glaucoma Father     Diabetes Father     Hypertension Father     Osteoporosis Paternal Grandmother     Diabetes Paternal Aunt 36     breast cancer     Social History   Substance Use Topics    Smoking status: Never Smoker    Smokeless tobacco: Never Used    Alcohol use No        Review of Systems   Constitutional: Negative. Negative for malaise/fatigue. Eyes: Negative for blurred vision. Respiratory: Negative for shortness of breath. Cardiovascular: Negative for chest pain and leg swelling. Musculoskeletal: Negative. Neurological: Negative. Negative for dizziness and headaches. All other systems reviewed and are negative. Physical Exam   Constitutional: She is oriented to person, place, and time. She appears well-developed and well-nourished. No distress. HENT:   Head: Normocephalic and atraumatic. Neck: Carotid bruit is not present. Cardiovascular: Normal rate, regular rhythm, normal heart sounds and intact distal pulses. Exam reveals no gallop and no friction rub. No murmur heard. Pulmonary/Chest: Effort normal and breath sounds normal. No respiratory distress. She has no wheezes. She has no rales. Musculoskeletal: She exhibits no edema. Neurological: She is alert and oriented to person, place, and time. Skin: She is not diaphoretic. Psychiatric: She has a normal mood and affect. Her behavior is normal. Judgment and thought content normal.   Nursing note and vitals reviewed. ASSESSMENT and PLAN  Diagnoses and all orders for this visit:    1. Essential hypertension  -     METABOLIC PANEL, BASIC  - Bp at office today 130/78 by manual arm cuff recheck. Pt continues with Hydrochlorothiazide 25mg daily, Losartan 50mg 2 tablets BID. Presumed stable, will assess levels today. 2. Hyperlipidemia with target LDL less than 130  -     LIPID PANEL  - Lipid panel on 02/13/17 notable for total cholesterol 231, , HDL 81, and triglycerides 69. Pt continues with diet and exercise and is not taking any statins. Presumed stable, will assess levels today. 3. Prediabetes  -     HEMOGLOBIN A1C WITH EAG  - A1c was 5.9% on 09/18/17 Advised pt to continue to monitor. I also advised pt to continue to diet and exercise. Presumed stable, will assess levels today. 4. Malignant neoplasm of upper-inner quadrant of right female breast, unspecified estrogen receptor status ([facility]  Assessment & Plan:  Stable, based on history, physical exam and review of pertinent labs, studies and medications; meds reconciled; pt has completed treatment. Key Oncology Meds     Patient is on no Oncologic meds. Key Pain Meds     The patient is on no pain meds.         Lab Results   Component Value Date/Time    WBC 10.3 09/20/2016 10:59 AM    ABS. NEUTROPHILS 6.5 09/20/2016 10:59 AM    HGB 13.8 09/20/2016 10:59 AM    HCT 41.0 09/20/2016 10:59 AM    PLATELET 936 50/17/6339 10:59 AM    Creatinine 0.82 02/13/2017 08:13 AM    BUN 18 02/13/2017 08:13 AM    ALT (SGPT) 23 02/13/2017 08:13 AM    AST (SGOT) 22 02/13/2017 08:13 AM    Albumin 4.4 02/13/2017 08:13 AM     - Stable, continue current regimen. 5. Obesity, Class I, BMI 30-34.9        - I have reviewed/discussed the above normal BMI with the patient. I have recommended the following interventions: dietary management education, guidance, and counseling, encourage exercise and monitor weight . - I advised pt that I can refer her to bariatric medicine if she would like. Follow-up Disposition:  Return in about 6 months (around 4/16/2018) for hypertension follow up. Medication risks/benefits/costs/interactions/alternatives discussed with patient. Advised patient to call back or return to office if symptoms worsen/change/persist.  If patient cannot reach us or should anything more severe/urgent arise he/she should proceed directly to the nearest emergency department. Discussed expected course/resolution/complications of diagnosis in detail with patient. Patient given a written after visit summary which includes her diagnoses, current medications and vitals. Patient expressed understanding with the diagnosis and plan. Written by talia Benedict, as dictated by Kevin Sears M.D.   I have reviewed and agree with the above note and have made corrections where appropriate, Dr. Vikram Chang MD

## 2017-10-16 NOTE — PROGRESS NOTES
Pt presents to office today for a 1 month follow up on HTN. Chief Complaint   Patient presents with    Hypertension     1 month follow up . 1. Have you been to the ER, urgent care clinic since your last visit? Hospitalized since your last visit? No    2. Have you seen or consulted any other health care providers outside of the [address] [place] [address] since your last visit? Include any pap smears or colon screening.  No

## 2017-10-17 LAB
BUN SERPL-MCNC: 18 MG/DL (ref 8–27)
BUN/CREAT SERPL: 23 (ref 12–28)
CALCIUM SERPL-MCNC: 10.4 MG/DL (ref 8.7–10.3)
CHLORIDE SERPL-SCNC: 96 MMOL/L (ref 96–106)
CHOLEST SERPL-MCNC: 245 MG/DL (ref 100–199)
CO2 SERPL-SCNC: 25 MMOL/L (ref 18–29)
CREAT SERPL-MCNC: 0.79 MG/DL (ref 0.57–1)
EST. AVERAGE GLUCOSE BLD GHB EST-MCNC: 126 MG/DL
GLUCOSE SERPL-MCNC: 95 MG/DL (ref 65–99)
HBA1C MFR BLD: 6 % (ref 4.8–5.6)
HDLC SERPL-MCNC: 83 MG/DL
INTERPRETATION, 910389: NORMAL
LDLC SERPL CALC-MCNC: 146 MG/DL (ref 0–99)
POTASSIUM SERPL-SCNC: 4.9 MMOL/L (ref 3.5–5.2)
SODIUM SERPL-SCNC: 142 MMOL/L (ref 134–144)
TRIGL SERPL-MCNC: 78 MG/DL (ref 0–149)
VLDLC SERPL CALC-MCNC: 16 MG/DL (ref 5–40)

## 2017-10-30 NOTE — PROGRESS NOTES
Inform pt to go to my chart to see results and recommendations    RECOMMENDATIONS:  Work on diet and exercise. Continue with current  medications. We need the cholesterol number to get better  The LDL needs to be less than 130. Good news is your HDL has gone up. Please work on exercise as discussed and we will recheck at your next office visit. Any questions let me know.     Regards  H

## 2017-11-30 DIAGNOSIS — I10 ESSENTIAL HYPERTENSION: Primary | ICD-10-CM

## 2017-11-30 RX ORDER — LOSARTAN POTASSIUM 100 MG/1
100 TABLET ORAL DAILY
Qty: 30 TAB | Refills: 5 | Status: SHIPPED | OUTPATIENT
Start: 2017-11-30 | End: 2017-12-27 | Stop reason: SDUPTHER

## 2017-12-27 DIAGNOSIS — I10 ESSENTIAL HYPERTENSION: ICD-10-CM

## 2017-12-27 RX ORDER — LOSARTAN POTASSIUM 100 MG/1
100 TABLET ORAL DAILY
Qty: 90 TAB | Refills: 1 | Status: SHIPPED | OUTPATIENT
Start: 2017-12-27 | End: 2018-07-20 | Stop reason: SDUPTHER

## 2018-03-26 DIAGNOSIS — I10 ESSENTIAL HYPERTENSION: ICD-10-CM

## 2018-03-27 RX ORDER — HYDROCHLOROTHIAZIDE 25 MG/1
TABLET ORAL
Qty: 90 TAB | Refills: 1 | Status: SHIPPED | OUTPATIENT
Start: 2018-03-27 | End: 2018-09-21 | Stop reason: SDUPTHER

## 2018-04-25 ENCOUNTER — OFFICE VISIT (OUTPATIENT)
Dept: FAMILY MEDICINE CLINIC | Age: 63
End: 2018-04-25

## 2018-04-25 VITALS
TEMPERATURE: 98.8 F | SYSTOLIC BLOOD PRESSURE: 132 MMHG | BODY MASS INDEX: 29.18 KG/M2 | RESPIRATION RATE: 18 BRPM | WEIGHT: 181.6 LBS | DIASTOLIC BLOOD PRESSURE: 80 MMHG | OXYGEN SATURATION: 100 % | HEIGHT: 66 IN | HEART RATE: 74 BPM

## 2018-04-25 DIAGNOSIS — E78.5 HYPERLIPIDEMIA WITH TARGET LDL LESS THAN 130: ICD-10-CM

## 2018-04-25 DIAGNOSIS — E66.3 OVERWEIGHT: ICD-10-CM

## 2018-04-25 DIAGNOSIS — C50.211 MALIGNANT NEOPLASM OF UPPER-INNER QUADRANT OF RIGHT FEMALE BREAST, UNSPECIFIED ESTROGEN RECEPTOR STATUS (HCC): ICD-10-CM

## 2018-04-25 DIAGNOSIS — E83.52 SERUM CALCIUM ELEVATED: ICD-10-CM

## 2018-04-25 DIAGNOSIS — I10 ESSENTIAL HYPERTENSION: Primary | ICD-10-CM

## 2018-04-25 DIAGNOSIS — R73.03 PREDIABETES: ICD-10-CM

## 2018-04-25 PROBLEM — E66.9 OBESITY, CLASS I, BMI 30-34.9: Status: RESOLVED | Noted: 2017-10-16 | Resolved: 2018-04-25

## 2018-04-25 NOTE — MR AVS SNAPSHOT
[facility] 
 
 
 [address] [facility] 
[phone] Patient: Jakob Concepcion MRN: [id number] Visit Information Date & Time Provider Department Dept. Phone Encounter #  
 4/25/2018  4:00 PM Liliana Keith  [facility] [phone] [id number] Follow-up Instructions Return in about 6 months (around 10/25/2018) for hypertension follow up, hyperlipidemia follow up. Upcoming Health Maintenance Date Due Pneumococcal 19-64 Highest Risk (1 of 3 - PCV13) 10/16/2020* BREAST CANCER SCRN MAMMOGRAM 5/8/2019 PAP AKA CERVICAL CYTOLOGY 4/17/2020 COLONOSCOPY 10/23/2024 DTaP/Tdap/Td series (2 - Td) 1/29/2025 *Topic was postponed. The date shown is not the original due date. Allergies as of 4/25/2018  Review Complete On: 4/25/2018 By: Liliana Keith MD  
 No Known Allergies Current Immunizations  Reviewed on 2/8/2017 Name Date Influenza Vaccine 9/16/2017, 10/12/2015 Influenza Vaccine [place]) 10/5/2016 Influenza Vaccine PF 10/22/2013 10:15 AM  
 Tdap 1/29/2015 11:10 AM  
 Zoster Vaccine, Live 12/10/2016 Not reviewed this visit You Were Diagnosed With   
  
 Codes Comments Essential hypertension    -  Primary ICD-10-CM: I10 
ICD-9-CM: 401.9 Prediabetes     ICD-10-CM: R73.03 
ICD-9-CM: 790.29 Hyperlipidemia with target LDL less than 130     ICD-10-CM: E78.5 ICD-9-CM: 272.4 Overweight     ICD-10-CM: O98.1 ICD-9-CM: 278.02 Malignant neoplasm of upper-inner quadrant of right female breast, unspecified [facility]     ICD-10-CM: J14.173 ICD-9-CM: 174.2 Vitals BP Pulse Temp Resp Height(growth percentile) Weight(growth percentile) 132/80 74 98.8 °F (37.1 °C) (Oral) 18 5' 6\" (1.676 m) 181 lb 9.6 oz (82.4 kg) SpO2 BMI OB Status Smoking Status 100% 29.31 kg/m2 Postmenopausal Never Smoker Vitals History BMI and BSA Data Body Mass Index Body Surface Area  
 29.31 kg/m 2 1.96 m 2 Preferred Pharmacy Pharmacy Name Phone [id number]- [place], [address] [facility] [phone] Your Updated Medication List  
  
   
This list is accurate as of 4/25/18  4:54 PM.  Always use your most recent med list.  
  
  
  
  
 ALPRAZolam 0.25 mg tablet Commonly known as:  XANAX  
take 1 tablet by mouth once daily if needed for anxiety  
  
 aspirin 81 mg chewable tablet Take 1 Tab by mouth daily. biotin 2,500 mcg Tab Take  by mouth. CLARITIN 10 mg tablet Generic drug:  loratadine Take 10 mg by mouth daily. hydroCHLOROthiazide 25 mg tablet Commonly known as:  HYDRODIURIL  
TAKE 1 TABLET BY MOUTH EVERY DAY  
  
 losartan 100 mg tablet Commonly known as:  COZAAR Take 1 Tab by mouth daily. NASACORT AQ 55 mcg nasal inhaler Generic drug:  triamcinolone 2 Sprays by Both Nostrils route daily. ONE-A-DAY WOMEN'S 50+ PO Take 1 Tab by mouth daily. VITAMIN B-12 1,000 mcg tablet Generic drug:  cyanocobalamin Take 1,000 mcg by mouth daily. We Performed the Following HEMOGLOBIN A1C WITH EAG [48555 CPT(R)] LIPID PANEL [05412 CPT(R)] METABOLIC PANEL, COMPREHENSIVE [97122 CPT(R)] Follow-up Instructions Return in about 6 months (around 10/25/2018) for hypertension follow up, hyperlipidemia follow up. To-Do List   
 05/10/2018 11:00 AM  
  Appointment with [facility] JENNY 3 at [address] [place] [address] ([phone]) Shower or bathe using soap and water. Do not use deodorant, powder, perfumes, or lotion the day of your exam.  If your prior mammograms were not performed at [facility] 6 please bring films with you or forward prior images 2 days before your procedure.   Check in at registration 15min before your appointment time unless you were instructed to do otherwise. A script is not necessary, but if you have one, please bring it on the day of the mammogram or have it faxed to the department. [facility] [phone] [facility]  [phone] [facility] [address] JUWAN  [phone] [phone] [facility] [facility] [phone] Patient Instructions Body Mass Index: Care Instructions Your Care Instructions Body mass index (BMI) can help you see if your weight is raising your risk for health problems. It uses a formula to compare how much you weigh with how tall you are. · A BMI lower than 18.5 is considered underweight. · A BMI between 18.5 and 24.9 is considered healthy. · A BMI between 25 and 29.9 is considered overweight. A BMI of 30 or higher is considered obese. If your BMI is in the normal range, it means that you have a lower risk for weight-related health problems. If your BMI is in the overweight or obese range, you may be at increased risk for weight-related health problems, such as high blood pressure, heart disease, stroke, arthritis or joint pain, and diabetes. If your BMI is in the underweight range, you may be at increased risk for health problems such as fatigue, lower protection (immunity) against illness, muscle loss, bone loss, hair loss, and hormone problems. BMI is just one measure of your risk for weight-related health problems. You may be at higher risk for health problems if you are not active, you eat an unhealthy diet, or you drink too much alcohol or use tobacco products. Follow-up care is a key part of your treatment and safety. Be sure to make and go to all appointments, and call your doctor if you are having problems. It's also a good idea to know your test results and keep a list of the medicines you take. How can you care for yourself at home? · Practice healthy eating habits. This includes eating plenty of fruits, vegetables, whole grains, lean protein, and low-fat dairy. · If your doctor recommends it, get more exercise. Walking is a good choice. Bit by bit, increase the amount you walk every day. Try for at least 30 minutes on most days of the week. · Do not smoke. Smoking can increase your risk for health problems. If you need help quitting, talk to your doctor about stop-smoking programs and medicines. These can increase your chances of quitting for good. · Limit alcohol to 2 drinks a day for men and 1 drink a day for women. Too much alcohol can cause health problems. If you have a BMI higher than 25 · Your doctor may do other tests to check your risk for weight-related health problems. This may include measuring the distance around your waist. A waist measurement of more than 40 inches in men or 35 inches in women can increase the risk of weight-related health problems. · Talk with your doctor about steps you can take to stay healthy or improve your health. You may need to make lifestyle changes to lose weight and stay healthy, such as changing your diet and getting regular exercise. If you have a BMI lower than 18.5 · Your doctor may do other tests to check your risk for health problems. · Talk with your doctor about steps you can take to stay healthy or improve your health. You may need to make lifestyle changes to gain or maintain weight and stay healthy, such as getting more healthy foods in your diet and doing exercises to build muscle. Where can you learn more? Go to http://brandi-lise.info/. Enter S176 in the search box to learn more about \"Body Mass Index: Care Instructions. \" Current as of: October 13, 2016 Content Version: 11.4 © 2139-3757 Healthwise, Incorporated. Care instructions adapted under license by [employer] (which disclaims liability or warranty for this information).  If you have questions about a medical condition or this instruction, always ask your healthcare professional. [employer] disclaims any warranty or liability for your use of this information. Introducing [facility]! Dear Christi Quintanilla: Thank you for requesting a [employer] account. Our records indicate that you already have an active [employer] account. You can access your account anytime at [email or web address] Did you know that you can access your hospital and ER discharge instructions at any time in [employer]? You can also review all of your test results from your hospital stay or ER visit. Additional Information If you have questions, please visit the Frequently Asked Questions section of the [employer] website at [email or web address] Remember, [employer] is NOT to be used for urgent needs. For medical emergencies, dial 911. Now available from your iPhone and Android! Please provide this summary of care documentation to your next provider. Your primary care clinician is listed as Keyona Denis. If you have any questions after today's visit, please call [phone].

## 2018-04-25 NOTE — PATIENT INSTRUCTIONS
Body Mass Index: Care Instructions  Your Care Instructions    Body mass index (BMI) can help you see if your weight is raising your risk for health problems. It uses a formula to compare how much you weigh with how tall you are. · A BMI lower than 18.5 is considered underweight. · A BMI between 18.5 and 24.9 is considered healthy. · A BMI between 25 and 29.9 is considered overweight. A BMI of 30 or higher is considered obese. If your BMI is in the normal range, it means that you have a lower risk for weight-related health problems. If your BMI is in the overweight or obese range, you may be at increased risk for weight-related health problems, such as high blood pressure, heart disease, stroke, arthritis or joint pain, and diabetes. If your BMI is in the underweight range, you may be at increased risk for health problems such as fatigue, lower protection (immunity) against illness, muscle loss, bone loss, hair loss, and hormone problems. BMI is just one measure of your risk for weight-related health problems. You may be at higher risk for health problems if you are not active, you eat an unhealthy diet, or you drink too much alcohol or use tobacco products. Follow-up care is a key part of your treatment and safety. Be sure to make and go to all appointments, and call your doctor if you are having problems. It's also a good idea to know your test results and keep a list of the medicines you take. How can you care for yourself at home? · Practice healthy eating habits. This includes eating plenty of fruits, vegetables, whole grains, lean protein, and low-fat dairy. · If your doctor recommends it, get more exercise. Walking is a good choice. Bit by bit, increase the amount you walk every day. Try for at least 30 minutes on most days of the week. · Do not smoke. Smoking can increase your risk for health problems. If you need help quitting, talk to your doctor about stop-smoking programs and medicines. These can increase your chances of quitting for good. · Limit alcohol to 2 drinks a day for men and 1 drink a day for women. Too much alcohol can cause health problems. If you have a BMI higher than 25  · Your doctor may do other tests to check your risk for weight-related health problems. This may include measuring the distance around your waist. A waist measurement of more than 40 inches in men or 35 inches in women can increase the risk of weight-related health problems. · Talk with your doctor about steps you can take to stay healthy or improve your health. You may need to make lifestyle changes to lose weight and stay healthy, such as changing your diet and getting regular exercise. If you have a BMI lower than 18.5  · Your doctor may do other tests to check your risk for health problems. · Talk with your doctor about steps you can take to stay healthy or improve your health. You may need to make lifestyle changes to gain or maintain weight and stay healthy, such as getting more healthy foods in your diet and doing exercises to build muscle. Where can you learn more? Go to http://brandi-lise.info/. Enter S176 in the search box to learn more about \"Body Mass Index: Care Instructions. \"  Current as of: October 13, 2016  Content Version: 11.4  © 1239-1879 Healthwise, Incorporated. Care instructions adapted under license by [employer] (which disclaims liability or warranty for this information). If you have questions about a medical condition or this instruction, always ask your healthcare professional. [address] any warranty or liability for your use of this information.

## 2018-04-25 NOTE — PROGRESS NOTES
HISTORY OF PRESENT ILLNESS  Servando Pretty is a 61 y.o. female. Blood pressure 132/80, pulse 74, temperature 98.8 °F (37.1 °C), temperature source Oral, resp. rate 18, height 5' 6\" (1.676 m), weight 181 lb 9.6 oz (82.4 kg), SpO2 100 %. Body mass index is 29.31 kg/(m^2). Chief Complaint   Patient presents with    Hypertension     6 month follow up        HPI  Servando Pretty 61 y.o. female  presents to the office today for hypertension follow up. Hypertension: Bp at office today 143/79, 132/80 by manual arm cuff recheck. Pt continues with HCTZ 25mg daily and Losartan 100mg daily. Bp control stable, continue current regimen. Hyperlipidemia: Lipid panel on 10/16/17 notable for total cholesterol 245, , HDL 83, and triglycerides 78. Pt continues with diet monitoring. Presumed stable, will assess levels today. Health maintenance: Advised pt to continue to work on her diet and exercise. Will recheck pt's lab work today due to her weight loss. Pt notes that she has some pain in the top of her feet when she walks, but will take an NSAID with relief. Current Outpatient Prescriptions   Medication Sig Dispense Refill    hydroCHLOROthiazide (HYDRODIURIL) 25 mg tablet TAKE 1 TABLET BY MOUTH EVERY DAY 90 Tab 1    losartan (COZAAR) 100 mg tablet Take 1 Tab by mouth daily. 90 Tab 1    ALPRAZolam (XANAX) 0.25 mg tablet take 1 tablet by mouth once daily if needed for anxiety 30 Tab 0    biotin 2,500 mcg tab Take  by mouth.  triamcinolone (NASACORT AQ) 55 mcg nasal inhaler 2 Sprays by Both Nostrils route daily.  aspirin 81 mg chewable tablet Take 1 Tab by mouth daily.  cyanocobalamin (VITAMIN B-12) 1,000 mcg tablet Take 1,000 mcg by mouth daily.  MULTIVITS-MIN/FA/CA CARB/VIT K (ONE-A-DAY WOMEN'S 50+ PO) Take 1 Tab by mouth daily.  loratadine (CLARITIN) 10 mg tablet Take 10 mg by mouth daily.        No Known Allergies  Past Medical History:   Diagnosis Date    Allergic rhinitis     Arrhythmia     Breast cancer ([facility] [address] 2010    right breast    Cancer ([facility] [address] 6/2010    right breast DCIS    Diverticulitis     Hypertension     Retinal tear of left eye 2/6/14     Past Surgical History:   Procedure Laterality Date    HX BREAST BIOPSY Bilateral 1970 70's and 2010 benign (surgical)    HX BREAST BIOPSY Left 05/2010    benign mri bx    HX BREAST BIOPSY Right 03/2010    positive stereo    HX BREAST LUMPECTOMY  4/2010    right breast    HX OTHER SURGICAL Left 2/6/14     Retinal tear repair by Dr. Saintclair Gaw Cherylann Kobs HX TONSILLECTOMY       Family History   Problem Relation Age of Onset    Other Mother      overweight, gout    Heart Disease Mother      chf    Hypertension Mother     Glaucoma Father     Diabetes Father     Hypertension Father     Osteoporosis Paternal Grandmother     Diabetes Paternal Aunt 36     breast cancer     Social History   Substance Use Topics    Smoking status: Never Smoker    Smokeless tobacco: Never Used    Alcohol use No        Review of Systems   Constitutional: Negative. Negative for malaise/fatigue. Eyes: Negative for blurred vision. Respiratory: Negative for shortness of breath. Cardiovascular: Negative for chest pain and leg swelling. Musculoskeletal: Negative. Neurological: Negative. Negative for dizziness and headaches. All other systems reviewed and are negative. Physical Exam   Constitutional: She is oriented to person, place, and time. She appears well-developed and well-nourished. No distress. HENT:   Head: Normocephalic and atraumatic. Neck: Carotid bruit is not present. Cardiovascular: Normal rate, regular rhythm, normal heart sounds and intact distal pulses. Exam reveals no gallop and no friction rub. No murmur heard. Pulmonary/Chest: Effort normal and breath sounds normal. No respiratory distress. She has no wheezes. She has no rales.    Musculoskeletal: She exhibits no edema. Neurological: She is alert and oriented to person, place, and time. Skin: She is not diaphoretic. Psychiatric: She has a normal mood and affect. Her behavior is normal. Judgment and thought content normal.   Nursing note and vitals reviewed. ASSESSMENT and PLAN  Diagnoses and all orders for this visit:    1. Essential hypertension  -     METABOLIC PANEL, COMPREHENSIVE  - Bp at office today 143/79, 132/80 by manual arm cuff recheck. Pt continues with HCTZ 25mg daily and Losartan 100mg daily. Bp control stable, continue current regimen. 2. Prediabetes  -     METABOLIC PANEL, COMPREHENSIVE  -     HEMOGLOBIN A1C WITH EAG  - Presumed stable, will assess levels today due to pt's recent weight loss. 3. Hyperlipidemia with target LDL less than 723  -     METABOLIC PANEL, COMPREHENSIVE  -     LIPID PANEL  - Lipid panel on 10/16/17 notable for total cholesterol 245, , HDL 83, and triglycerides 78. Pt continues with diet monitoring. Presumed stable, will assess levels today. 4. Overweight         - I have reviewed/discussed the above normal BMI with the patient. I have recommended the following interventions: dietary management education, guidance, and counseling, encourage exercise and monitor weight . 5. Malignant neoplasm of upper-inner quadrant of right female breast, unspecified estrogen receptor status ([facility] [address]  Assessment & Plan: This condition is managed by Specialist.  Key Oncology Meds     Patient is on no Oncologic meds. Key Pain Meds     The patient is on no pain meds. Lab Results   Component Value Date/Time    Creatinine 0.79 10/16/2017 03:42 PM    BUN 18 10/16/2017 03:42 PM    ALT (SGPT) 23 02/13/2017 08:13 AM    AST (SGOT) 22 02/13/2017 08:13 AM    Albumin 4.4 02/13/2017 08:13 AM        Follow-up Disposition:  Return for hypertension follow up, hyperlipidemia follow up.    Medication risks/benefits/costs/interactions/alternatives discussed with patient. Advised patient to call back or return to office if symptoms worsen/change/persist.  If patient cannot reach us or should anything more severe/urgent arise he/she should proceed directly to the nearest emergency department. Discussed expected course/resolution/complications of diagnosis in detail with patient. Patient given a written after visit summary which includes her diagnoses, current medications and vitals. Patient expressed understanding with the diagnosis and plan. Written by taila Bowie, as dictated by Kenia Keen M.D.   I have reviewed and agree with the above note and have made corrections where appropriate, Dr. Ej Lamas MD

## 2018-04-25 NOTE — ASSESSMENT & PLAN NOTE
This condition is managed by Specialist.  Key Oncology Meds     Patient is on no Oncologic meds. Key Pain Meds     The patient is on no pain meds.         Lab Results   Component Value Date/Time    Creatinine 0.79 10/16/2017 03:42 PM    BUN 18 10/16/2017 03:42 PM    ALT (SGPT) 23 02/13/2017 08:13 AM    AST (SGOT) 22 02/13/2017 08:13 AM    Albumin 4.4 02/13/2017 08:13 AM

## 2018-04-25 NOTE — PROGRESS NOTES
Chief Complaint   Patient presents with    Hypertension     6 month follow up     1. Have you been to the ER, urgent care clinic since your last visit? Hospitalized since your last visit? No    2.  Have you seen or consulted any other health care providers outside of the [address] [place] [address] since your last visit?  no

## 2018-04-26 LAB
ALBUMIN SERPL-MCNC: 4.5 G/DL (ref 3.6–4.8)
ALBUMIN/GLOB SERPL: 1.7 {RATIO} (ref 1.2–2.2)
ALP SERPL-CCNC: 99 IU/L (ref 39–117)
ALT SERPL-CCNC: 22 IU/L (ref 0–32)
AST SERPL-CCNC: 23 IU/L (ref 0–40)
BILIRUB SERPL-MCNC: 0.3 MG/DL (ref 0–1.2)
BUN SERPL-MCNC: 17 MG/DL (ref 8–27)
BUN/CREAT SERPL: 23 (ref 12–28)
CALCIUM SERPL-MCNC: 10.9 MG/DL (ref 8.7–10.3)
CHLORIDE SERPL-SCNC: 94 MMOL/L (ref 96–106)
CHOLEST SERPL-MCNC: 229 MG/DL (ref 100–199)
CO2 SERPL-SCNC: 26 MMOL/L (ref 18–29)
CREAT SERPL-MCNC: 0.74 MG/DL (ref 0.57–1)
EST. AVERAGE GLUCOSE BLD GHB EST-MCNC: 123 MG/DL
GFR SERPLBLD CREATININE-BSD FMLA CKD-EPI: 100 ML/MIN/1.73
GFR SERPLBLD CREATININE-BSD FMLA CKD-EPI: 86 ML/MIN/1.73
GLOBULIN SER CALC-MCNC: 2.7 G/DL (ref 1.5–4.5)
GLUCOSE SERPL-MCNC: 88 MG/DL (ref 65–99)
HBA1C MFR BLD: 5.9 % (ref 4.8–5.6)
HDLC SERPL-MCNC: 91 MG/DL
INTERPRETATION, 910389: NORMAL
LDLC SERPL CALC-MCNC: 125 MG/DL (ref 0–99)
POTASSIUM SERPL-SCNC: 4.2 MMOL/L (ref 3.5–5.2)
PROT SERPL-MCNC: 7.2 G/DL (ref 6–8.5)
SODIUM SERPL-SCNC: 137 MMOL/L (ref 134–144)
TRIGL SERPL-MCNC: 63 MG/DL (ref 0–149)
VLDLC SERPL CALC-MCNC: 13 MG/DL (ref 5–40)

## 2018-05-07 NOTE — PROGRESS NOTES
Calcium levels are elevated    Please place order for PTH, serum calcium and phosphate. And advice pt to come for labs. Advice pt to stop any calcium supplements. Cholesterol and A1C are coming down!  As discussed

## 2018-05-07 NOTE — PROGRESS NOTES
Read by Mark White at 2018  8:24 AM  Patient aware via my chart    [phone] verified  Patient notified of above note and voiced understanding of what was read.   Per Dr Sandy ayers to order PTH serum calcium and phosphate

## 2018-05-08 DIAGNOSIS — E83.52 SERUM CALCIUM ELEVATED: ICD-10-CM

## 2018-05-09 LAB
CALCIUM SERPL-MCNC: 9.8 MG/DL (ref 8.7–10.3)
PHOSPHATE SERPL-MCNC: 3.5 MG/DL (ref 2.5–4.5)
PTH-INTACT SERPL-MCNC: 43 PG/ML (ref 15–65)

## 2018-05-10 ENCOUNTER — HOSPITAL ENCOUNTER (OUTPATIENT)
Dept: MAMMOGRAPHY | Age: 63
Discharge: HOME | End: 2018-05-10
Attending: OBSTETRICS & GYNECOLOGY
Payer: COMMERCIAL

## 2018-05-10 DIAGNOSIS — Z12.31 VISIT FOR SCREENING MAMMOGRAM: ICD-10-CM

## 2018-05-10 PROCEDURE — 77067 SCR MAMMO BI INCL CAD: CPT

## 2018-07-20 DIAGNOSIS — I10 ESSENTIAL HYPERTENSION: ICD-10-CM

## 2018-07-20 RX ORDER — LOSARTAN POTASSIUM 100 MG/1
TABLET ORAL
Qty: 90 TAB | Refills: 1 | Status: SHIPPED | OUTPATIENT
Start: 2018-07-20 | End: 2019-01-14 | Stop reason: SDUPTHER

## 2018-09-21 DIAGNOSIS — I10 ESSENTIAL HYPERTENSION: ICD-10-CM

## 2018-09-24 RX ORDER — HYDROCHLOROTHIAZIDE 25 MG/1
TABLET ORAL
Qty: 90 TAB | Refills: 1 | Status: SHIPPED | OUTPATIENT
Start: 2018-09-24 | End: 2019-03-15 | Stop reason: SDUPTHER

## 2018-10-24 ENCOUNTER — OFFICE VISIT (OUTPATIENT)
Dept: FAMILY MEDICINE CLINIC | Age: 63
End: 2018-10-24

## 2018-10-24 VITALS
DIASTOLIC BLOOD PRESSURE: 68 MMHG | RESPIRATION RATE: 18 BRPM | HEIGHT: 66 IN | SYSTOLIC BLOOD PRESSURE: 130 MMHG | OXYGEN SATURATION: 97 % | TEMPERATURE: 98.4 F | HEART RATE: 90 BPM | WEIGHT: 180 LBS | BODY MASS INDEX: 28.93 KG/M2

## 2018-10-24 DIAGNOSIS — Z23 NEED FOR SHINGLES VACCINE: ICD-10-CM

## 2018-10-24 DIAGNOSIS — G47.9 SLEEP DISTURBANCE: ICD-10-CM

## 2018-10-24 DIAGNOSIS — E66.3 OVERWEIGHT: ICD-10-CM

## 2018-10-24 DIAGNOSIS — I10 ESSENTIAL HYPERTENSION: Primary | ICD-10-CM

## 2018-10-24 DIAGNOSIS — F41.9 ANXIETY: ICD-10-CM

## 2018-10-24 RX ORDER — ALPRAZOLAM 0.25 MG/1
TABLET ORAL
Qty: 30 TAB | Refills: 0 | Status: SHIPPED | OUTPATIENT
Start: 2018-10-24 | End: 2019-08-22 | Stop reason: SDUPTHER

## 2018-10-24 NOTE — PROGRESS NOTES
Chief Complaint Patient presents with  Hypertension 6 month f/u Reviewed Record in preparation for visit and have obtained necessary documentation. Identified pt with two pt identifiers (Name @ ) Health Maintenance Due Topic  Shingrix Vaccine Age 50> (1 of 2) 1. Have you been to the ER, urgent care clinic since your last visit? Hospitalized since your last visit? no 
 
2. Have you seen or consulted any other health care providers outside of the [address] [place] [address] since your last visit? Include any pap smears or colon screening.  no

## 2018-10-24 NOTE — PATIENT INSTRUCTIONS
Home Blood Pressure Test: About This Test 
What is it? A home blood pressure test allows you to keep track of your blood pressure at home. Blood pressure is a measure of the force of blood against the walls of your arteries. Blood pressure readings include two numbers, such as 130/80 (say \"130 over 80\"). The first number is the systolic pressure. The second number is the diastolic pressure. Why is this test done? You may do this test at home to: · Find out if you have high blood pressure. · Track your blood pressure if you have high blood pressure. · Track how well medicine is working to reduce high blood pressure. · Check how lifestyle changes, such as weight loss and exercise, are affecting blood pressure. How can you prepare for the test? 
· Do not use caffeine, tobacco, or medicines known to raise blood pressure (such as nasal decongestant sprays) for at least 30 minutes before taking your blood pressure. · Do not exercise for at least 30 minutes before taking your blood pressure. What happens before the test? 
Take your blood pressure while you feel comfortable and relaxed. Sit quietly with both feet on the floor for at least 5 minutes before the test. 
What happens during the test? 
· Sit with your arm slightly bent and resting on a table so that your upper arm is at the same level as your heart. · Roll up your sleeve or take off your shirt to expose your upper arm. · Wrap the blood pressure cuff around your upper arm so that the lower edge of the cuff is about 1 inch above the bend of your elbow. Proceed with the following steps depending on if you are using an automatic or manual pressure monitor. Automatic blood pressure monitors · Press the on/off button on the automatic monitor and wait until the ready-to-measure \"heart\" symbol appears next to zero in the display window. · Press the start button. The cuff will inflate and deflate by itself. · Your blood pressure numbers will appear on the screen. · Write your numbers in your log book, along with the date and time. Manual blood pressure monitors · Place the earpieces of a stethoscope in your ears, and place the bell of the stethoscope over the artery, just below the cuff. · Close the valve on the rubber inflating bulb. · Squeeze the bulb rapidly with your opposite hand to inflate the cuff until the dial or column of mercury reads about 30 mm Hg higher than your usual systolic pressure. If you do not know your usual pressure, inflate the cuff to 210 mm Hg or until the pulse at your wrist disappears. · Open the pressure valve just slightly by twisting or pressing the valve on the bulb. · As you watch the pressure slowly fall, note the level on the dial at which you first start to hear a pulsing or tapping sound through the stethoscope. This is your systolic blood pressure. · Continue letting the air out slowly. The sounds will become muffled and will finally disappear. Note the pressure when the sounds completely disappear. This is your diastolic blood pressure. Let out all the remaining air. · Write your numbers in your log book, along with the date and time. What else should you know about the test? 
Here are the categories of blood pressure for adults: 
Ideal blood pressure. Systolic is less than 684, and diastolic is less than 80. Elevated blood pressure. Systolic is 654 to 709, and diastolic is less than 80. High blood pressure (hypertension). Systolic is 374 or above. Diastolic is 80 or above. One or both numbers may be high. It is more accurate to take the average of several readings made throughout the day than to rely on a single reading. Follow-up care is a key part of your treatment and safety. Be sure to make and go to all appointments, and call your doctor if you are having problems. It's also a good idea to keep a list of the medicines you take. Where can you learn more? Go to http://brandi-lise.info/. Enter C427 in the search box to learn more about \"Home Blood Pressure Test: About This Test.\" Current as of: December 6, 2017 Content Version: 11.8 © 3885-7736 [employer]. Care instructions adapted under license by [employer] (which disclaims liability or warranty for this information). If you have questions about a medical condition or this instruction, always ask your healthcare professional. [address] any warranty or liability for your use of this information.

## 2018-10-24 NOTE — PROGRESS NOTES
HISTORY OF PRESENT ILLNESS Juaquin Parent is a 61 y.o. female who presents today for Blood pressure 130/68, pulse 90, temperature 98.4 °F (36.9 °C), temperature source Oral, resp. rate 18, height 5' 6\" (1.676 m), weight 180 lb (81.6 kg), SpO2 97 %. Body mass index is 29.05 kg/m². Chief Complaint Patient presents with  Hypertension 6 month f/u HPI Hypertension: Bp at office today 140/80. 130/68 on manual arm cuff recheck. Pt continues with 25mg of hydrodiuril daily and 100mg of losartan daily. Pt reports not taking blood pressure readings at home because she has lost about 25 pounds and she \"feels great\". Pt reports running/ walking a 5k first thing every morning. Pulsatile tinnitus: Pt notes that Dr. Liz Fields diagnosed her with pulsatile tinnitus in her L ear. Advised pt to let us know if it returns. Health Maintenance: Pt discussed in great detail the frustrations she's had with her  about his unhealthy eating habits and lack of exercise. Pt is fasting today for blood work. Current Outpatient Medications Medication Sig Dispense Refill  ALPRAZolam (XANAX) 0.25 mg tablet take 1 tablet by mouth once daily if needed for anxiety 30 Tab 0  
 varicella-zoster recombinant, PF, (SHINGRIX, PF,) 50 mcg/0.5 mL susr injection 0.5 mL by IntraMUSCular route once for 1 dose. Give second dose 2 months after first vaccine 0.5 mL 1  
 hydroCHLOROthiazide (HYDRODIURIL) 25 mg tablet TAKE 1 TABLET BY MOUTH EVERY DAY 90 Tab 1  
 losartan (COZAAR) 100 mg tablet TAKE 1 TAB BY MOUTH DAILY. 90 Tab 1  
 biotin 2,500 mcg tab Take  by mouth.  triamcinolone (NASACORT AQ) 55 mcg nasal inhaler 2 Sprays by Both Nostrils route daily.  aspirin 81 mg chewable tablet Take 1 Tab by mouth daily.  cyanocobalamin (VITAMIN B-12) 1,000 mcg tablet Take 1,000 mcg by mouth daily.  MULTIVITS-MIN/FA/CA CARB/VIT K (ONE-A-DAY WOMEN'S 50+ PO) Take 1 Tab by mouth daily.  loratadine (CLARITIN) 10 mg tablet Take 10 mg by mouth daily. No Known Allergies Past Medical History:  
Diagnosis Date  Allergic rhinitis  Arrhythmia  Breast cancer ([address] 2010  
 right breast  
 Cancer [facility]) 6/2010  
 right breast DCIS  
 Diverticulitis  Hypertension  Retinal tear of left eye 2/6/14 Past Surgical History:  
Procedure Laterality Date  HX BREAST BIOPSY Bilateral 1970 70's and 2010 benign (surgical)  HX BREAST BIOPSY Left 05/2010  
 benign mri bx  
 HX BREAST BIOPSY Right 03/2010  
 positive stereo  HX BREAST LUMPECTOMY  4/2010  
 right breast  
 HX OTHER SURGICAL Left 2/6/14 Retinal tear repair by Dr. Villalobos Hand  HX TONSIL AND ADENOIDECTOMY  HX TONSILLECTOMY Family History Problem Relation Age of Onset  Other Mother   
     overweight, gout  Heart Disease Mother   
     Satira Husbands Hypertension Mother  Glaucoma Father  Diabetes Father  Hypertension Father  Osteoporosis Paternal Grandmother  Diabetes Paternal Aunt 44  
     breast cancer Social History Tobacco Use  Smoking status: Never Smoker  Smokeless tobacco: Never Used Substance Use Topics  Alcohol use: No  
  
 
Review of Systems Constitutional: Negative. Negative for malaise/fatigue. Eyes: Negative for blurred vision. Respiratory: Negative for shortness of breath. Cardiovascular: Negative for chest pain and leg swelling. Musculoskeletal: Negative. Neurological: Negative. Negative for dizziness and headaches. All other systems reviewed and are negative. Physical Exam  
Constitutional: She is oriented to person, place, and time. She appears well-developed and well-nourished. No distress. HENT:  
Head: Normocephalic and atraumatic. Neck: Carotid bruit is not present. Cardiovascular: Normal rate, regular rhythm, normal heart sounds and intact distal pulses. Exam reveals no gallop and no friction rub. No murmur heard. Pulmonary/Chest: Effort normal and breath sounds normal. No respiratory distress. She has no wheezes. She has no rales. Musculoskeletal: She exhibits no edema. Neurological: She is alert and oriented to person, place, and time. Skin: She is not diaphoretic. Psychiatric: She has a normal mood and affect. Her behavior is normal. Judgment and thought content normal.  
Nursing note and vitals reviewed. ASSESSMENT and PLAN Diagnoses and all orders for this visit: 1. Essential hypertension -      Advised pt to continue to eat healthy and exercise and continue current regimen 2. Anxiety -     ALPRAZolam (XANAX) 0.25 mg tablet; take 1 tablet by mouth once daily if needed for anxiety  
-     Continue current regimen 3. Sleep disturbance -     ALPRAZolam (XANAX) 0.25 mg tablet; take 1 tablet by mouth once daily if needed for anxiety -     See above 4. Overweight -      Advised pt to continue healthy eating and doing 5ks every morning. 5. Need for shingles vaccine 
-     varicella-zoster recombinant, PF, (SHINGRIX, PF,) 50 mcg/0.5 mL susr injection; 0.5 mL by IntraMUSCular route once for 1 dose. Give second dose 2 months after first vaccine Follow-up Disposition: 
Return in about 6 months (around 4/24/2019). Medication risks/benefits/costs/interactions/alternatives discussed with patient. Advised patient to call back or return to office if symptoms worsen/change/persist.  If patient cannot reach us or should anything more severe/urgent arise he/she should proceed directly to the nearest emergency department. Discussed expected course/resolution/complications of diagnosis in detail with patient. Patient given a written after visit summary which includes her diagnoses, current medications and vitals. Patient expressed understanding with the diagnosis and plan.  
Written by talia Araujo, as dictated by Nico Mcclure M.D. 
 4:59 PM - 5:23 PM 
 
Total time spent with the patient 24 minutes, greater than 50% of time spent counseling patient.

## 2018-10-25 LAB
ALBUMIN SERPL-MCNC: 4.4 G/DL (ref 3.6–4.8)
ALBUMIN/GLOB SERPL: 1.5 {RATIO} (ref 1.2–2.2)
ALP SERPL-CCNC: 98 IU/L (ref 39–117)
ALT SERPL-CCNC: 22 IU/L (ref 0–32)
AST SERPL-CCNC: 25 IU/L (ref 0–40)
BILIRUB SERPL-MCNC: 0.4 MG/DL (ref 0–1.2)
BUN SERPL-MCNC: 17 MG/DL (ref 8–27)
BUN/CREAT SERPL: 21 (ref 12–28)
CALCIUM SERPL-MCNC: 10 MG/DL (ref 8.7–10.3)
CHLORIDE SERPL-SCNC: 97 MMOL/L (ref 96–106)
CHOLEST SERPL-MCNC: 217 MG/DL (ref 100–199)
CO2 SERPL-SCNC: 25 MMOL/L (ref 20–29)
CREAT SERPL-MCNC: 0.81 MG/DL (ref 0.57–1)
GLOBULIN SER CALC-MCNC: 2.9 G/DL (ref 1.5–4.5)
GLUCOSE SERPL-MCNC: 100 MG/DL (ref 65–99)
HDLC SERPL-MCNC: 90 MG/DL
INTERPRETATION, 910389: NORMAL
LDLC SERPL CALC-MCNC: 117 MG/DL (ref 0–99)
POTASSIUM SERPL-SCNC: 3.9 MMOL/L (ref 3.5–5.2)
PROT SERPL-MCNC: 7.3 G/DL (ref 6–8.5)
SODIUM SERPL-SCNC: 139 MMOL/L (ref 134–144)
TRIGL SERPL-MCNC: 52 MG/DL (ref 0–149)
VLDLC SERPL CALC-MCNC: 10 MG/DL (ref 5–40)

## 2018-11-05 NOTE — PROGRESS NOTES
Inform pt to go to my chart to see results and recommendations Labs look great keep up the good work

## 2019-01-14 DIAGNOSIS — I10 ESSENTIAL HYPERTENSION: ICD-10-CM

## 2019-01-15 RX ORDER — LOSARTAN POTASSIUM 100 MG/1
TABLET ORAL
Qty: 90 TAB | Refills: 1 | Status: SHIPPED | OUTPATIENT
Start: 2019-01-15 | End: 2019-07-13 | Stop reason: SDUPTHER

## 2019-01-26 ENCOUNTER — TELEPHONE (OUTPATIENT)
Dept: FAMILY MEDICINE CLINIC | Age: 64
End: 2019-01-26

## 2019-01-26 NOTE — TELEPHONE ENCOUNTER
----- Message from Fay Johansen sent at 1/26/2019 11:37 AM EST -----  Regarding: /Telephone  Pt requesting a call back in regards to changing an appt. Best contact [phone].

## 2019-01-28 ENCOUNTER — OFFICE VISIT (OUTPATIENT)
Dept: FAMILY MEDICINE CLINIC | Age: 64
End: 2019-01-28

## 2019-01-28 VITALS
HEIGHT: 66 IN | RESPIRATION RATE: 18 BRPM | HEART RATE: 73 BPM | SYSTOLIC BLOOD PRESSURE: 139 MMHG | DIASTOLIC BLOOD PRESSURE: 69 MMHG | BODY MASS INDEX: 28.93 KG/M2 | TEMPERATURE: 98 F | OXYGEN SATURATION: 98 % | WEIGHT: 180 LBS

## 2019-01-28 DIAGNOSIS — J06.9 ACUTE URI: Primary | ICD-10-CM

## 2019-01-28 RX ORDER — BENZONATATE 200 MG/1
200 CAPSULE ORAL
Qty: 15 CAP | Refills: 0 | Status: SHIPPED | OUTPATIENT
Start: 2019-01-28 | End: 2019-02-04

## 2019-01-28 NOTE — PROGRESS NOTES
Chief Complaint   Patient presents with    Cough     1. Have you been to the ER, urgent care clinic since your last visit? Hospitalized since your last visit? No    2. Have you seen or consulted any other health care providers outside of the [address] [place] [address] since your last visit? Include any pap smears or colon screening.  No

## 2019-01-28 NOTE — TELEPHONE ENCOUNTER
[phone] spoke to patient notified we don't have anything available for Dr. Immanuel Morel today and set her up with TYREL Marquez for bronchitis patient understand

## 2019-01-28 NOTE — TELEPHONE ENCOUNTER
----- Message from Danitza Magallanes sent at 1/26/2019  2:54 PM EST -----  Regarding: Dr Immanuel Morel, Telephone  Pt is returning call to [place], [place] contact # [phone] or [phone]

## 2019-01-28 NOTE — PROGRESS NOTES
Assessment/Plan:     Diagnoses and all orders for this visit:    1. Acute URI  -     benzonatate (TESSALON) 200 mg capsule; Take 1 Cap by mouth three (3) times daily as needed for Cough for up to 7 days. Resolving. Likely viral.  Continue current treatment plan. Tessalon Perles as above. Follow-up if no improvement by next week. Follow-up Disposition:  Return if symptoms worsen or fail to improve. Discussed expected course/resolution/complications of diagnosis in detail with patient.    Medication risks/benefits/costs/interactions/alternatives discussed with patient.    Pt was given after visit summary which includes diagnoses, current medications & vitals. Pt expressed understanding with the diagnosis and plan          Subjective:      Fidelina Juares is a 61 y.o. female who presents for had concerns including Cough (x 1 week with chest congestion, SOB). Upper Respiratory Infection  Patient complains of symptoms of a URI. Symptoms include congestion and cough. Onset of symptoms was 10 days ago, unchanged since that time. She also c/o no  fever for the past 10 days . She is drinking plenty of fluids. . Evaluation to date: none. Treatment to date: OTC products, which have been ineffective. Current Outpatient Medications   Medication Sig Dispense Refill    benzonatate (TESSALON) 200 mg capsule Take 1 Cap by mouth three (3) times daily as needed for Cough for up to 7 days. 15 Cap 0    losartan (COZAAR) 100 mg tablet TAKE 1 TABLET BY MOUTH EVERY DAY 90 Tab 1    ALPRAZolam (XANAX) 0.25 mg tablet take 1 tablet by mouth once daily if needed for anxiety 30 Tab 0    hydroCHLOROthiazide (HYDRODIURIL) 25 mg tablet TAKE 1 TABLET BY MOUTH EVERY DAY 90 Tab 1    biotin 2,500 mcg tab Take  by mouth.  triamcinolone (NASACORT AQ) 55 mcg nasal inhaler 2 Sprays by Both Nostrils route daily.  aspirin 81 mg chewable tablet Take 1 Tab by mouth daily.       cyanocobalamin (VITAMIN B-12) 1,000 mcg tablet Take 1,000 mcg by mouth daily.  MULTIVITS-MIN/FA/CA CARB/VIT K (ONE-A-DAY WOMEN'S 50+ PO) Take 1 Tab by mouth daily.  loratadine (CLARITIN) 10 mg tablet Take 10 mg by mouth daily. No Known Allergies    ROS:   Review of Systems   Constitutional: Negative for chills, fever and malaise/fatigue. HENT: Negative for congestion, ear pain, sinus pain and sore throat. Respiratory: Positive for cough. Negative for sputum production, shortness of breath and wheezing. Cardiovascular: Negative for chest pain. Neurological: Negative for seizures. Endo/Heme/Allergies: Negative for environmental allergies. Objective:     Visit Vitals  /69   Pulse 73   Temp 98 °F (36.7 °C) (Oral)   Resp 18   Ht 5' 6\" (1.676 m)   Wt 180 lb (81.6 kg)   SpO2 98%   BMI 29.05 kg/m²       Vitals and Nurse Documentation reviewed. Physical Exam   Constitutional: No distress. HENT:   Right Ear: Tympanic membrane is not erythematous and not bulging. No middle ear effusion. Left Ear: Tympanic membrane is not erythematous and not bulging. No middle ear effusion. Nose: No rhinorrhea. Right sinus exhibits no maxillary sinus tenderness and no frontal sinus tenderness. Left sinus exhibits no maxillary sinus tenderness and no frontal sinus tenderness. Mouth/Throat: No oropharyngeal exudate or posterior oropharyngeal erythema. Eyes: EOM and lids are normal.   Cardiovascular: S1 normal and S2 normal. Exam reveals no gallop and no friction rub. No murmur heard. Pulmonary/Chest: Breath sounds normal. She has no wheezes. Lymphadenopathy:     She has no cervical adenopathy. Skin: Skin is warm and dry.    Psychiatric: Mood and affect normal.

## 2019-03-15 DIAGNOSIS — I10 ESSENTIAL HYPERTENSION: ICD-10-CM

## 2019-03-15 NOTE — TELEPHONE ENCOUNTER
Pharmacy requesting 90day supply     Requested Prescriptions     Pending Prescriptions Disp Refills    hydroCHLOROthiazide (HYDRODIURIL) 25 mg tablet 90 Tab 1     Pharmacy on file verified  [phone])    Pt scheduled to be seen Wednesday, April 24, 2019 04:00 PM

## 2019-03-16 RX ORDER — HYDROCHLOROTHIAZIDE 25 MG/1
TABLET ORAL
Qty: 90 TAB | Refills: 1 | Status: SHIPPED | OUTPATIENT
Start: 2019-03-16 | End: 2019-09-09 | Stop reason: SDUPTHER

## 2019-04-24 ENCOUNTER — OFFICE VISIT (OUTPATIENT)
Dept: FAMILY MEDICINE CLINIC | Age: 64
End: 2019-04-24

## 2019-04-24 VITALS
HEIGHT: 66 IN | HEART RATE: 70 BPM | DIASTOLIC BLOOD PRESSURE: 70 MMHG | WEIGHT: 183 LBS | OXYGEN SATURATION: 99 % | TEMPERATURE: 97.8 F | BODY MASS INDEX: 29.41 KG/M2 | SYSTOLIC BLOOD PRESSURE: 124 MMHG | RESPIRATION RATE: 18 BRPM

## 2019-04-24 DIAGNOSIS — I10 ESSENTIAL HYPERTENSION: Primary | ICD-10-CM

## 2019-04-24 DIAGNOSIS — G89.29 CHRONIC LEFT SHOULDER PAIN: ICD-10-CM

## 2019-04-24 DIAGNOSIS — E78.5 HYPERLIPIDEMIA WITH TARGET LDL LESS THAN 130: ICD-10-CM

## 2019-04-24 DIAGNOSIS — E66.3 OVERWEIGHT: ICD-10-CM

## 2019-04-24 DIAGNOSIS — C50.211 MALIGNANT NEOPLASM OF UPPER-INNER QUADRANT OF RIGHT FEMALE BREAST, UNSPECIFIED ESTROGEN RECEPTOR STATUS (HCC): ICD-10-CM

## 2019-04-24 DIAGNOSIS — R73.03 PREDIABETES: ICD-10-CM

## 2019-04-24 DIAGNOSIS — M25.512 CHRONIC LEFT SHOULDER PAIN: ICD-10-CM

## 2019-04-24 NOTE — ASSESSMENT & PLAN NOTE
This condition is managed by Specialist. 
Key Oncology Meds Patient is on no Oncologic meds. Key Pain Meds The patient is on no pain meds. Lab Results Component Value Date/Time  Creatinine 0.81 10/24/2018 05:32 PM  
 BUN 17 10/24/2018 05:32 PM  
 ALT (SGPT) 22 10/24/2018 05:32 PM  
 AST (SGOT) 25 10/24/2018 05:32 PM  
 Albumin 4.4 10/24/2018 05:32 PM

## 2019-04-24 NOTE — PROGRESS NOTES
HPI 
Nico Krueger 59 y.o. female  presents to the office today for HTN follow up. Blood pressure 124/70, pulse 70, temperature 97.8 °F (36.6 °C), temperature source Oral, resp. rate 18, height 5' 6\" (1.676 m), weight 183 lb (83 kg), SpO2 99 %. Body mass index is 29.54 kg/m². Chief Complaint Patient presents with  Hypertension  
  follow up Hypertension: BP at office today 152/79 and 124/70 on manual recheck. Pt continues with HCT 25 mg/d and losartan 100 mg/d. Pt has been cleared from oncology and has mammograms yearly. She notes her next mammogram is in 2 weeks. Prediabetes: Last A1c was 5.9 on 4/25/19. Hyperlipidemia: Lipid panel on 10/24/18 notable for total cholesterol 217, HDL 90, , and triglycerides 52. Pt continues with dietary management. Left arm pain: Pt reports left shoulder pain. She notes she has stacked washer/dryer unit at home and believe it is the cause of the pain. She did not have pain prior to installation of this new unit. Pt notes she has been taking Aleve to manage the pain. Pt notes when she does not take aleve for several days, her shoulder feels as if it is out of socket. Pt notes pain is 4/10 today in office. Overweight: I have reviewed/discussed the above normal BMI with the patient. I have recommended the following interventions: dietary management education, guidance, and counseling, encourage exercise and monitor weight . Current Outpatient Medications Medication Sig Dispense Refill  hydroCHLOROthiazide (HYDRODIURIL) 25 mg tablet TAKE 1 TABLET BY MOUTH EVERY DAY 90 Tab 1  
 losartan (COZAAR) 100 mg tablet TAKE 1 TABLET BY MOUTH EVERY DAY 90 Tab 1  ALPRAZolam (XANAX) 0.25 mg tablet take 1 tablet by mouth once daily if needed for anxiety 30 Tab 0  
 biotin 2,500 mcg tab Take  by mouth.  triamcinolone (NASACORT AQ) 55 mcg nasal inhaler 2 Sprays by Both Nostrils route daily.  cyanocobalamin (VITAMIN B-12) 1,000 mcg tablet Take 1,000 mcg by mouth daily.  MULTIVITS-MIN/FA/CA CARB/VIT K (ONE-A-DAY WOMEN'S 50+ PO) Take 1 Tab by mouth daily.  loratadine (CLARITIN) 10 mg tablet Take 10 mg by mouth daily.  aspirin 81 mg chewable tablet Take 1 Tab by mouth daily. No Known Allergies Past Medical History:  
Diagnosis Date  Allergic rhinitis  Arrhythmia  Breast cancer ([address] 2010  
 right breast  
 Cancer [facility]) 6/2010  
 right breast DCIS  
 Diverticulitis  Hypertension  Retinal tear of left eye 2/6/14 Past Surgical History:  
Procedure Laterality Date  HX BREAST BIOPSY Bilateral 1970 70's and 2010 benign (surgical)  HX BREAST BIOPSY Left 05/2010  
 benign mri bx  
 HX BREAST BIOPSY Right 03/2010  
 positive stereo  HX BREAST LUMPECTOMY  4/2010  
 right breast  
 HX OTHER SURGICAL Left 2/6/14 Retinal tear repair by Dr. Mcdonald Finder  HX TONSIL AND ADENOIDECTOMY  HX TONSILLECTOMY Family History Problem Relation Age of Onset  Other Mother   
     overweight, gout  Heart Disease Mother   
     [place] Hypertension Mother  Glaucoma Father  Diabetes Father  Hypertension Father  Osteoporosis Paternal Grandmother  Diabetes Paternal Aunt 44  
     breast cancer Social History Tobacco Use  Smoking status: Never Smoker  Smokeless tobacco: Never Used Substance Use Topics  Alcohol use: No  
  
 
Review of Systems Constitutional: Negative for chills and fever. HENT: Negative for hearing loss and tinnitus. Eyes: Negative for blurred vision and double vision. Respiratory: Negative for shortness of breath. Cardiovascular: Negative for chest pain and palpitations. Gastrointestinal: Negative for nausea and vomiting. Genitourinary: Negative for dysuria and frequency. Musculoskeletal: Negative for back pain and falls. Skin: Negative for itching and rash. Neurological: Negative for dizziness, loss of consciousness and headaches. Psychiatric/Behavioral: Negative for depression. The patient is not nervous/anxious. Physical Exam  
Constitutional: She is oriented to person, place, and time. She appears well-developed and well-nourished. HENT:  
Head: Normocephalic and atraumatic. Right Ear: External ear normal.  
Left Ear: External ear normal.  
Nose: Nose normal.  
Mouth/Throat: Oropharynx is clear and moist.  
Eyes: Conjunctivae and EOM are normal.  
Neck: Normal range of motion. Neck supple. Carotid bruit is not present. No thyroid mass and no thyromegaly present. Cardiovascular: Normal rate, regular rhythm, S1 normal, S2 normal, normal heart sounds and intact distal pulses. Pulmonary/Chest: Effort normal and breath sounds normal.  
Abdominal: Soft. Normal appearance and bowel sounds are normal. There is no hepatosplenomegaly. There is no tenderness. Musculoskeletal: Normal range of motion. Neurological: She is alert and oriented to person, place, and time. She has normal strength. No cranial nerve deficit or sensory deficit. Coordination normal.  
Skin: Skin is warm, dry and intact. No abrasion and no rash noted. Psychiatric: She has a normal mood and affect. Her behavior is normal. Judgment and thought content normal.  
Nursing note and vitals reviewed. ASSESSMENT and PLAN Diagnoses and all orders for this visit: 1. Essential hypertension BP is at goal today in office. Advised pt to continue with losartan 100 mg/d and HCT 25 mg/d. -     METABOLIC PANEL, COMPREHENSIVE 
-     CBC WITH AUTOMATED DIFF 2. Prediabetes Will reassess labs. Last A1c was 5.9 on 10/2018.  
-     HEMOGLOBIN A1C WITH EAG 3. Overweight I have reviewed/discussed the above normal BMI with the patient. I have recommended the following interventions: dietary management education, guidance, and counseling, encourage exercise and monitor weight . 4. Hyperlipidemia with target LDL less than 130 Presumed stable, will assess levels today; last LDL at goal.  
-     LIPID PANEL 5. Malignant neoplasm of upper-inner quadrant of right female breast, unspecified estrogen receptor status ([facility] [address] Assessment & Plan: This condition is managed by Specialist. 
Key Oncology Meds Patient is on no Oncologic meds. Key Pain Meds The patient is on no pain meds. Lab Results Component Value Date/Time Creatinine 0.81 10/24/2018 05:32 PM  
 BUN 17 10/24/2018 05:32 PM  
 ALT (SGPT) 22 10/24/2018 05:32 PM  
 AST (SGOT) 25 10/24/2018 05:32 PM  
 Albumin 4.4 10/24/2018 05:32 PM  
 
 
 
6. Chronic left shoulder pain Advised pt to follow up with Dr. Fidencio Willoughby for an evaluation, concerned about rotary cuff tear or sprain.  
-     REFERRAL TO SPORTS MEDICINE Follow-up and Dispositions · Return in about 6 months (around 10/24/2019) for hyperlipidemia follow up, hypertension follow up. Medication risks/benefits/costs/interactions/alternatives discussed with patient. Advised patient to call back or return to office if symptoms worsen/change/persist.  If patient cannot reach us or should anything more severe/urgent arise he/she should proceed directly to the nearest emergency department. Discussed expected course/resolution/complications of diagnosis in detail with patient. Patient given a written after visit summary which includes her diagnoses, current medications and vitals. Patient expressed understanding with the diagnosis and plan. Written by talia Baumann, as dictated by Kevin Sears M.D. 
 
4:21 PM - 4:38 PM 
 
Total time spent with the patient 17 minutes, greater than 50% of time spent counseling patient.

## 2019-04-24 NOTE — PROGRESS NOTES
Chief Complaint Patient presents with  Hypertension  
  follow up 1. Have you been to the ER, urgent care clinic since your last visit? Hospitalized since your last visit? No 
 
2. Have you seen or consulted any other health care providers outside of the [address] [place] [address] since your last visit? Include any pap smears or colon screening.  No

## 2019-04-24 NOTE — PATIENT INSTRUCTIONS
Home Blood Pressure Test: About This Test 
What is it? A home blood pressure test allows you to keep track of your blood pressure at home. Blood pressure is a measure of the force of blood against the walls of your arteries. Blood pressure readings include two numbers, such as 130/80 (say \"130 over 80\"). The first number is the systolic pressure. The second number is the diastolic pressure. Why is this test done? You may do this test at home to: · Find out if you have high blood pressure. · Track your blood pressure if you have high blood pressure. · Track how well medicine is working to reduce high blood pressure. · Check how lifestyle changes, such as weight loss and exercise, are affecting blood pressure. How can you prepare for the test? 
· Do not use caffeine, tobacco, or medicines known to raise blood pressure (such as nasal decongestant sprays) for at least 30 minutes before taking your blood pressure. · Do not exercise for at least 30 minutes before taking your blood pressure. What happens before the test? 
Take your blood pressure while you feel comfortable and relaxed. Sit quietly with both feet on the floor for at least 5 minutes before the test. 
What happens during the test? 
· Sit with your arm slightly bent and resting on a table so that your upper arm is at the same level as your heart. · Roll up your sleeve or take off your shirt to expose your upper arm. · Wrap the blood pressure cuff around your upper arm so that the lower edge of the cuff is about 1 inch above the bend of your elbow. Proceed with the following steps depending on if you are using an automatic or manual pressure monitor. Automatic blood pressure monitors · Press the on/off button on the automatic monitor and wait until the ready-to-measure \"heart\" symbol appears next to zero in the display window. · Press the start button. The cuff will inflate and deflate by itself. · Your blood pressure numbers will appear on the screen. · Write your numbers in your log book, along with the date and time. Manual blood pressure monitors · Place the earpieces of a stethoscope in your ears, and place the bell of the stethoscope over the artery, just below the cuff. · Close the valve on the rubber inflating bulb. · Squeeze the bulb rapidly with your opposite hand to inflate the cuff until the dial or column of mercury reads about 30 mm Hg higher than your usual systolic pressure. If you do not know your usual pressure, inflate the cuff to 210 mm Hg or until the pulse at your wrist disappears. · Open the pressure valve just slightly by twisting or pressing the valve on the bulb. · As you watch the pressure slowly fall, note the level on the dial at which you first start to hear a pulsing or tapping sound through the stethoscope. This is your systolic blood pressure. · Continue letting the air out slowly. The sounds will become muffled and will finally disappear. Note the pressure when the sounds completely disappear. This is your diastolic blood pressure. Let out all the remaining air. · Write your numbers in your log book, along with the date and time. What else should you know about the test? 
It is more accurate to take the average of several readings made throughout the day than to rely on a single reading. It's normal for blood pressure to go up and down throughout the day. Follow-up care is a key part of your treatment and safety. Be sure to make and go to all appointments, and call your doctor if you are having problems. It's also a good idea to keep a list of the medicines you take. Where can you learn more? Go to http://brandi-lise.info/. Enter C427 in the search box to learn more about \"Home Blood Pressure Test: About This Test.\" Current as of: July 22, 2018 Content Version: 11.9 © 2297-6501 [employer].  Care instructions adapted under license by [address] (which disclaims liability or warranty for this information). If you have questions about a medical condition or this instruction, always ask your healthcare professional. [address] any warranty or liability for your use of this information.

## 2019-04-26 LAB
ALBUMIN SERPL-MCNC: 4.5 G/DL (ref 3.6–4.8)
ALBUMIN/GLOB SERPL: 2 {RATIO} (ref 1.2–2.2)
ALP SERPL-CCNC: 87 IU/L (ref 39–117)
ALT SERPL-CCNC: 20 IU/L (ref 0–32)
AST SERPL-CCNC: 23 IU/L (ref 0–40)
BASOPHILS # BLD AUTO: 0 X10E3/UL (ref 0–0.2)
BASOPHILS NFR BLD AUTO: 0 %
BILIRUB SERPL-MCNC: 0.4 MG/DL (ref 0–1.2)
BUN SERPL-MCNC: 13 MG/DL (ref 8–27)
BUN/CREAT SERPL: 15 (ref 12–28)
CALCIUM SERPL-MCNC: 9.7 MG/DL (ref 8.7–10.3)
CHLORIDE SERPL-SCNC: 98 MMOL/L (ref 96–106)
CHOLEST SERPL-MCNC: 201 MG/DL (ref 100–199)
CO2 SERPL-SCNC: 25 MMOL/L (ref 20–29)
CREAT SERPL-MCNC: 0.88 MG/DL (ref 0.57–1)
EOSINOPHIL # BLD AUTO: 0.2 X10E3/UL (ref 0–0.4)
EOSINOPHIL NFR BLD AUTO: 2 %
ERYTHROCYTE [DISTWIDTH] IN BLOOD BY AUTOMATED COUNT: 14 % (ref 12.3–15.4)
EST. AVERAGE GLUCOSE BLD GHB EST-MCNC: 120 MG/DL
GLOBULIN SER CALC-MCNC: 2.3 G/DL (ref 1.5–4.5)
GLUCOSE SERPL-MCNC: 97 MG/DL (ref 65–99)
HBA1C MFR BLD: 5.8 % (ref 4.8–5.6)
HCT VFR BLD AUTO: 37.8 % (ref 34–46.6)
HDLC SERPL-MCNC: 85 MG/DL
HGB BLD-MCNC: 12.2 G/DL (ref 11.1–15.9)
IMM GRANULOCYTES # BLD AUTO: 0 X10E3/UL (ref 0–0.1)
IMM GRANULOCYTES NFR BLD AUTO: 0 %
INTERPRETATION, 910389: NORMAL
LDLC SERPL CALC-MCNC: 104 MG/DL (ref 0–99)
LYMPHOCYTES # BLD AUTO: 2.3 X10E3/UL (ref 0.7–3.1)
LYMPHOCYTES NFR BLD AUTO: 26 %
MCH RBC QN AUTO: 28.4 PG (ref 26.6–33)
MCHC RBC AUTO-ENTMCNC: 32.3 G/DL (ref 31.5–35.7)
MCV RBC AUTO: 88 FL (ref 79–97)
MONOCYTES # BLD AUTO: 0.5 X10E3/UL (ref 0.1–0.9)
MONOCYTES NFR BLD AUTO: 6 %
NEUTROPHILS # BLD AUTO: 6.1 X10E3/UL (ref 1.4–7)
NEUTROPHILS NFR BLD AUTO: 66 %
PLATELET # BLD AUTO: 359 X10E3/UL (ref 150–379)
POTASSIUM SERPL-SCNC: 4.9 MMOL/L (ref 3.5–5.2)
PROT SERPL-MCNC: 6.8 G/DL (ref 6–8.5)
RBC # BLD AUTO: 4.29 X10E6/UL (ref 3.77–5.28)
SODIUM SERPL-SCNC: 137 MMOL/L (ref 134–144)
TRIGL SERPL-MCNC: 60 MG/DL (ref 0–149)
VLDLC SERPL CALC-MCNC: 12 MG/DL (ref 5–40)
WBC # BLD AUTO: 9.1 X10E3/UL (ref 3.4–10.8)

## 2019-05-02 ENCOUNTER — OFFICE VISIT (OUTPATIENT)
Dept: INTERNAL MEDICINE CLINIC | Age: 64
End: 2019-05-02

## 2019-05-02 VITALS
SYSTOLIC BLOOD PRESSURE: 138 MMHG | HEIGHT: 66 IN | TEMPERATURE: 97.5 F | OXYGEN SATURATION: 99 % | RESPIRATION RATE: 16 BRPM | DIASTOLIC BLOOD PRESSURE: 81 MMHG | WEIGHT: 181 LBS | HEART RATE: 81 BPM | BODY MASS INDEX: 29.09 KG/M2

## 2019-05-02 DIAGNOSIS — M25.512 ACUTE PAIN OF LEFT SHOULDER: ICD-10-CM

## 2019-05-02 DIAGNOSIS — M75.42 IMPINGEMENT SYNDROME OF LEFT SHOULDER: Primary | ICD-10-CM

## 2019-05-02 NOTE — PROGRESS NOTES
Chief Complaint Patient presents with  Shoulder Pain  
 
she is a 59y.o. year old female who presents for evaluation of left shoulder Pain Assessment Encounter [facility] 5/2/2019 Onset of Symptoms: a couple weeks 
________________________________________________________________________ Description: Patient describes pain as an ache but in certain positions or movements that are sharp. Frequency: more than 5 times a day Pain Scale:(1-10): 8 Trauma Hx: none Hx of similar symptoms: No 
Radiation: NO 
Duration:  continuous Progression: has improved What makes it better?: OTC meds and rest 
What makes it worse?:use and certain movements/positions Medications tried: acetaminophen, ibuprofen Reviewed and agree with Nurse Note and duplicated in this note. Reviewed PmHx, RxHx, FmHx, SocHx, AllgHx and updated and dated in the chart. Family History Problem Relation Age of Onset  Other Mother   
     overweight, gout  Heart Disease Mother   
     [place] Hypertension Mother  Glaucoma Father  Diabetes Father  Hypertension Father  Osteoporosis Paternal Grandmother  Diabetes Paternal Aunt 44  
     breast cancer Past Medical History:  
Diagnosis Date  Allergic rhinitis  Arrhythmia  Breast cancer ([facility] [address] 2010  
 right breast  
 Cancer [facility]) 6/2010  
 right breast DCIS  
 Diverticulitis  Hypertension  Retinal tear of left eye 2/6/14 Social History Socioeconomic History  Marital status:  Spouse name: Not on file  Number of children: Not on file  Years of education: Not on file  Highest education level: Not on file Tobacco Use  Smoking status: Never Smoker  Smokeless tobacco: Never Used Substance and Sexual Activity  Alcohol use: No  
 Drug use: No  
 Sexual activity: Yes  
  Partners: Male Other Topics Concern   Service No  
 Blood Transfusions No  
 Caffeine Concern No  
  Occupational Exposure No  
 Hobby Hazards No  
 Sleep Concern No  
 Stress Concern No  
 Weight Concern Yes  Special Diet No  
 Back Care No  
 Exercise No  
 Bike Helmet No  
 Seat Belt Yes  Self-Exams Yes Review of Systems - negative except as listed above Objective:  
 
Vitals:  
 05/02/19 1455 Weight: 181 lb (82.1 kg) Height: 5' 6\" (1.676 m) Physical Examination: General appearance - alert, well appearing, and in no distress Back exam - full range of motion, no tenderness, palpable spasm or pain on motion Neurological - alert, oriented, normal speech, no focal findings or movement disorder noted Musculoskeletal - The left shoulder is  normal to inspection. The patient has diminished range with pain  . The shoulderis tender to palpation . Bicep tendon: non-tender The NEER test is positive. The Khan test:is positive The Cross over test:is  negative. The Empty Can test:is  negative. Stressed ext rotation is:  negative. Stressed int rotation: negative. The Apprehension Sign is negative. The Lift off test is:  negative Examination reveals the Painful Arch:  negative. The Labral Test is:  negative. The Sulcus Sign is:  negative. Extremities - peripheral pulses normal, no pedal edema, no clubbing or cyanosis Skin - normal coloration and turgor, no rashes, no suspicious skin lesions noted Assessment/ Plan:  
Diagnoses and all orders for this visit: 
 
1. Acute pain of left shoulder -     XR SHOULDER LT AP/LAT MIN 2 V; Future 2. Impingement syndrome of left shoulder Will consider formal physical therapy if no resolution of pain Pathophysiology, recovery and rehabilitation process discussed and questions answered Counseling for 30 Minutes of the total visit duration Pictures and figures used as necessary Provided reassurance Handouts provided and reviewed with patient for impingement Monitor response to injection Discussed steroid side effects of fat atrophy, hypopigmentation, steroid flare or infection Pt fitted and instructions provided for use Monitor response to home exercises Recommend activity modification Recommend  lower impact activities-walking, Eliptical, [employer], cycling or swimming Follow up in 4 week(s) 1) Remember to stay active and/or exercise regularly (I suggest 30-45 minutes daily) 2) For reliable dietary information, go to www. EATRIGHT.org. You may wish to consider seeing the nutritionist at [facility] [phone], also consider the [address] I have discussed the diagnosis with the patient and the intended plan as seen in the above orders. The patient has received an after-visit summary and questions were answered concerning future plans. Medication Side Effects and Warnings were discussed with patient, Patient Labs were reviewed and or requested, and 
Patient Past Records were reviewed and or requested  yes Pt agrees to call or return to clinic and/or go to closest ER with any worsening of symptoms. This may include, but not limited to increased fever (>100.4) with NSAIDS or Tylenol, increased edema, confusion, rash, worsening of presenting symptoms.

## 2019-05-13 ENCOUNTER — HOSPITAL ENCOUNTER (OUTPATIENT)
Dept: MAMMOGRAPHY | Age: 64
Discharge: HOME | End: 2019-05-13
Attending: FAMILY MEDICINE
Payer: COMMERCIAL

## 2019-05-13 DIAGNOSIS — Z12.39 SCREENING BREAST EXAMINATION: ICD-10-CM

## 2019-05-13 PROCEDURE — 77063 BREAST TOMOSYNTHESIS BI: CPT

## 2019-05-14 NOTE — PROGRESS NOTES
Inform pt to go to my chart to see results and recommendations Labs look much better So proud of your hard work! Keep it up Any questions let me know

## 2019-07-13 DIAGNOSIS — I10 ESSENTIAL HYPERTENSION: ICD-10-CM

## 2019-07-13 RX ORDER — LOSARTAN POTASSIUM 100 MG/1
TABLET ORAL
Qty: 90 TAB | Refills: 1 | Status: SHIPPED | OUTPATIENT
Start: 2019-07-13 | End: 2020-01-09

## 2019-08-22 DIAGNOSIS — G47.9 SLEEP DISTURBANCE: ICD-10-CM

## 2019-08-22 DIAGNOSIS — F41.9 ANXIETY: ICD-10-CM

## 2019-08-22 RX ORDER — ALPRAZOLAM 0.25 MG/1
TABLET ORAL
Qty: 30 TAB | Refills: 0 | Status: SHIPPED | OUTPATIENT
Start: 2019-08-22 | End: 2020-01-17 | Stop reason: SDUPTHER

## 2019-08-22 NOTE — TELEPHONE ENCOUNTER
[phone] [facility] called in prescription for patient xanax via VM    Requested Prescriptions     Signed Prescriptions Disp Refills    ALPRAZolam (XANAX) 0.25 mg tablet 30 Tab 0     Sig: take 1 tablet by mouth once daily if needed for anxiety     Authorizing Provider: Evette Mon

## 2019-08-22 NOTE — TELEPHONE ENCOUNTER
LOV 4/2019  Has appointment 10/23/2019    The Prescription Monitoring Program has been reviewed for recent activity regarding controlled substances for this patient.      Per  last refill  10/24/2019

## 2019-09-09 DIAGNOSIS — I10 ESSENTIAL HYPERTENSION: ICD-10-CM

## 2019-09-09 RX ORDER — HYDROCHLOROTHIAZIDE 25 MG/1
TABLET ORAL
Qty: 90 TAB | Refills: 1 | Status: SHIPPED | OUTPATIENT
Start: 2019-09-09 | End: 2020-03-10 | Stop reason: SDUPTHER

## 2019-10-23 ENCOUNTER — OFFICE VISIT (OUTPATIENT)
Dept: FAMILY MEDICINE CLINIC | Age: 64
End: 2019-10-23

## 2019-10-23 VITALS
BODY MASS INDEX: 30.31 KG/M2 | SYSTOLIC BLOOD PRESSURE: 125 MMHG | TEMPERATURE: 98.2 F | OXYGEN SATURATION: 97 % | DIASTOLIC BLOOD PRESSURE: 78 MMHG | RESPIRATION RATE: 18 BRPM | HEART RATE: 69 BPM | WEIGHT: 188.6 LBS | HEIGHT: 66 IN

## 2019-10-23 DIAGNOSIS — I10 ESSENTIAL HYPERTENSION: Primary | ICD-10-CM

## 2019-10-23 DIAGNOSIS — R73.03 PREDIABETES: ICD-10-CM

## 2019-10-23 DIAGNOSIS — E66.9 OBESITY, CLASS I, BMI 30-34.9: ICD-10-CM

## 2019-10-23 DIAGNOSIS — E78.5 HYPERLIPIDEMIA WITH TARGET LDL LESS THAN 130: ICD-10-CM

## 2019-10-23 NOTE — PROGRESS NOTES
DAMIÁN Colvin 59 y.o. female  presents to the office today for follow up on disease management. Blood pressure 125/78, pulse 69, temperature 98.2 °F (36.8 °C), temperature source Oral, resp. rate 18, height 5' 6\" (1.676 m), weight 188 lb 9.6 oz (85.5 kg), SpO2 97 %. Body mass index is 30.44 kg/m². Chief Complaint   Patient presents with    Hypertension     f/u     Cholesterol Problem     f/u        Hypertension: BP at office today 125/78. Pt continues with HCTZ 25 mg/d and Cozaar 100 mg/d. Hyperlipidemia: Lipid panel on 4/25/19 notable for total cholesterol 201, HDL 85, , and triglycerides 60. Pt is not taking any cholesterol medications. Obesity: I have reviewed/discussed the above normal BMI with the patient. Prediabetes: A1c 5.8 on 4/25/19. Current Outpatient Medications   Medication Sig Dispense Refill    hydroCHLOROthiazide (HYDRODIURIL) 25 mg tablet TAKE 1 TABLET BY MOUTH EVERY DAY 90 Tab 1    ALPRAZolam (XANAX) 0.25 mg tablet take 1 tablet by mouth once daily if needed for anxiety 30 Tab 0    losartan (COZAAR) 100 mg tablet TAKE 1 TABLET BY MOUTH EVERY DAY 90 Tab 1    biotin 2,500 mcg tab Take  by mouth.  triamcinolone (NASACORT AQ) 55 mcg nasal inhaler 2 Sprays by Both Nostrils route daily.  cyanocobalamin (VITAMIN B-12) 1,000 mcg tablet Take 1,000 mcg by mouth daily.  MULTIVITS-MIN/FA/CA CARB/VIT K (ONE-A-DAY WOMEN'S 50+ PO) Take 1 Tab by mouth daily.  loratadine (CLARITIN) 10 mg tablet Take 10 mg by mouth daily.  aspirin 81 mg chewable tablet Take 1 Tab by mouth daily.        No Known Allergies  Past Medical History:   Diagnosis Date    Allergic rhinitis     Arrhythmia     Breast cancer ([address] 2010    right breast    Cancer ([facility] [address] 6/2010    right breast DCIS    Diverticulitis     Hypertension     Retinal tear of left eye 2/6/14     Past Surgical History:   Procedure Laterality Date    HX BREAST BIOPSY Bilateral 1970 70's and 2010 benign (surgical)    HX BREAST BIOPSY Left 05/2010    benign mri bx    HX BREAST BIOPSY Right 03/2010    positive stereo    HX BREAST LUMPECTOMY  4/2010    right breast    HX OTHER SURGICAL Left 2/6/14     Retinal tear repair by Dr. Antony Bran Lutricia Kawasaki HX TONSILLECTOMY       Family History   Problem Relation Age of Onset    Other Mother         overweight, gout    Heart Disease Mother         chf    Hypertension Mother     Glaucoma Father     Diabetes Father     Hypertension Father     Osteoporosis Paternal Grandmother     Diabetes Paternal Aunt 44        breast cancer     Social History     Tobacco Use    Smoking status: Never Smoker    Smokeless tobacco: Never Used   Substance Use Topics    Alcohol use: No        Review of Systems   Constitutional: Negative for chills and fever. HENT: Negative for hearing loss and tinnitus. Eyes: Negative for blurred vision and double vision. Respiratory: Negative for cough and shortness of breath. Cardiovascular: Negative for chest pain and palpitations. Gastrointestinal: Negative for nausea and vomiting. Genitourinary: Negative for dysuria and frequency. Musculoskeletal: Negative for back pain and falls. Skin: Negative for itching and rash. Neurological: Negative for dizziness, loss of consciousness and headaches. Psychiatric/Behavioral: Negative for depression. The patient is not nervous/anxious. Physical Exam   Constitutional: She is oriented to person, place, and time. She appears well-developed and well-nourished. HENT:   Head: Normocephalic and atraumatic. Right Ear: External ear normal.   Left Ear: External ear normal.   Nose: Nose normal.   Mouth/Throat: Oropharynx is clear and moist.   Eyes: Conjunctivae and EOM are normal.   Neck: Normal range of motion. Neck supple. Cardiovascular: Normal rate, regular rhythm, normal heart sounds and intact distal pulses.    Pulmonary/Chest: Effort normal and breath sounds normal.   Abdominal: Soft. Bowel sounds are normal.   Musculoskeletal: Normal range of motion. Neurological: She is alert and oriented to person, place, and time. Skin: Skin is warm and dry. Psychiatric: She has a normal mood and affect. Her behavior is normal. Judgment and thought content normal.   Nursing note and vitals reviewed. ASSESSMENT and PLAN  Diagnoses and all orders for this visit:    1. Essential hypertension  BP is at goal today in office. Advised pt to continue with HCTZ 25 mg/d and Cozaar 100 mg/d. -     METABOLIC PANEL, COMPREHENSIVE  -     CBC WITH AUTOMATED DIFF    2. Prediabetes  Last A1c 5.8. Will assess levels today.   -     HEMOGLOBIN A1C WITH EAG    3. Hyperlipidemia with target LDL less than 130  Presumed stable, will assess levels today. -     LIPID PANEL    4. Obesity, Class I, BMI 30-34.9  I have reviewed/discussed the above normal BMI with the patient. I have recommended the following interventions: dietary management education, guidance, and counseling, encourage exercise and monitor weight . Follow-up and Dispositions    · Return in about 6 months (around 4/23/2020) for hypertension follow up, hyperlipidemia follow up. Medication risks/benefits/costs/interactions/alternatives discussed with patient. Advised patient to call back or return to office if symptoms worsen/change/persist.  If patient cannot reach us or should anything more severe/urgent arise he/she should proceed directly to the nearest emergency department. Discussed expected course/resolution/complications of diagnosis in detail with patient. Patient given a written after visit summary which includes her diagnoses, current medications and vitals. Patient expressed understanding with the diagnosis and plan.     Written by talia Varner, as dictated by Eli Rose M.D.    4:21 PM - 4:30 PM    Total time spent with the patient 9 minutes, greater than 50% of time spent counseling patient.

## 2019-10-23 NOTE — PROGRESS NOTES
Chief Complaint   Patient presents with    Hypertension     f/u     Cholesterol Problem     f/u       Reviewed Record in preparation for visit and have obtained necessary documentation. Identified pt with two pt identifiers (Name @ )    Health Maintenance Due   Topic    Shingrix Vaccine Age 50> (1 of 2)    Influenza Age 5 to Adult          1. Have you been to the ER, urgent care clinic since your last visit? Hospitalized since your last visit? no    2. Have you seen or consulted any other health care providers outside of the [address] [place] [address] since your last visit? Include any pap smears or colon screening.  No

## 2019-10-23 NOTE — PATIENT INSTRUCTIONS
Body Mass Index: Care Instructions Your Care Instructions Body mass index (BMI) can help you see if your weight is raising your risk for health problems. It uses a formula to compare how much you weigh with how tall you are. · A BMI lower than 18.5 is considered underweight. · A BMI between 18.5 and 24.9 is considered healthy. · A BMI between 25 and 29.9 is considered overweight. A BMI of 30 or higher is considered obese. If your BMI is in the normal range, it means that you have a lower risk for weight-related health problems. If your BMI is in the overweight or obese range, you may be at increased risk for weight-related health problems, such as high blood pressure, heart disease, stroke, arthritis or joint pain, and diabetes. If your BMI is in the underweight range, you may be at increased risk for health problems such as fatigue, lower protection (immunity) against illness, muscle loss, bone loss, hair loss, and hormone problems. BMI is just one measure of your risk for weight-related health problems. You may be at higher risk for health problems if you are not active, you eat an unhealthy diet, or you drink too much alcohol or use tobacco products. Follow-up care is a key part of your treatment and safety. Be sure to make and go to all appointments, and call your doctor if you are having problems. It's also a good idea to know your test results and keep a list of the medicines you take. How can you care for yourself at home? · Practice healthy eating habits. This includes eating plenty of fruits, vegetables, whole grains, lean protein, and low-fat dairy. · If your doctor recommends it, get more exercise. Walking is a good choice. Bit by bit, increase the amount you walk every day. Try for at least 30 minutes on most days of the week. · Do not smoke. Smoking can increase your risk for health problems.  If you need help quitting, talk to your doctor about stop-smoking programs and medicines. These can increase your chances of quitting for good. · Limit alcohol to 2 drinks a day for men and 1 drink a day for women. Too much alcohol can cause health problems. If you have a BMI higher than 25 · Your doctor may do other tests to check your risk for weight-related health problems. This may include measuring the distance around your waist. A waist measurement of more than 40 inches in men or 35 inches in women can increase the risk of weight-related health problems. · Talk with your doctor about steps you can take to stay healthy or improve your health. You may need to make lifestyle changes to lose weight and stay healthy, such as changing your diet and getting regular exercise. If you have a BMI lower than 18.5 · Your doctor may do other tests to check your risk for health problems. · Talk with your doctor about steps you can take to stay healthy or improve your health. You may need to make lifestyle changes to gain or maintain weight and stay healthy, such as getting more healthy foods in your diet and doing exercises to build muscle. Where can you learn more? Go to http://brandi-lise.info/. Enter S176 in the search box to learn more about \"Body Mass Index: Care Instructions. \" Current as of: March 28, 2019 Content Version: 12.2 © 9359-4602 [employer]. Care instructions adapted under license by [employer] (which disclaims liability or warranty for this information). If you have questions about a medical condition or this instruction, always ask your healthcare professional. [address] any warranty or liability for your use of this information.

## 2019-10-24 LAB
ALBUMIN SERPL-MCNC: 5 G/DL (ref 3.6–4.8)
ALBUMIN/GLOB SERPL: 1.9 {RATIO} (ref 1.2–2.2)
ALP SERPL-CCNC: 98 IU/L (ref 39–117)
ALT SERPL-CCNC: 39 IU/L (ref 0–32)
AST SERPL-CCNC: 37 IU/L (ref 0–40)
BASOPHILS # BLD AUTO: 0 X10E3/UL (ref 0–0.2)
BASOPHILS NFR BLD AUTO: 0 %
BILIRUB SERPL-MCNC: 0.3 MG/DL (ref 0–1.2)
BUN SERPL-MCNC: 16 MG/DL (ref 8–27)
BUN/CREAT SERPL: 19 (ref 12–28)
CALCIUM SERPL-MCNC: 10.6 MG/DL (ref 8.7–10.3)
CHLORIDE SERPL-SCNC: 95 MMOL/L (ref 96–106)
CHOLEST SERPL-MCNC: 250 MG/DL (ref 100–199)
CO2 SERPL-SCNC: 25 MMOL/L (ref 20–29)
CREAT SERPL-MCNC: 0.84 MG/DL (ref 0.57–1)
EOSINOPHIL # BLD AUTO: 0.2 X10E3/UL (ref 0–0.4)
EOSINOPHIL NFR BLD AUTO: 2 %
ERYTHROCYTE [DISTWIDTH] IN BLOOD BY AUTOMATED COUNT: 12.6 % (ref 12.3–15.4)
EST. AVERAGE GLUCOSE BLD GHB EST-MCNC: 120 MG/DL
GLOBULIN SER CALC-MCNC: 2.6 G/DL (ref 1.5–4.5)
GLUCOSE SERPL-MCNC: 95 MG/DL (ref 65–99)
HBA1C MFR BLD: 5.8 % (ref 4.8–5.6)
HCT VFR BLD AUTO: 38.7 % (ref 34–46.6)
HDLC SERPL-MCNC: 99 MG/DL
HGB BLD-MCNC: 13 G/DL (ref 11.1–15.9)
IMM GRANULOCYTES # BLD AUTO: 0 X10E3/UL (ref 0–0.1)
IMM GRANULOCYTES NFR BLD AUTO: 0 %
INTERPRETATION, 910389: NORMAL
LDLC SERPL CALC-MCNC: 134 MG/DL (ref 0–99)
LYMPHOCYTES # BLD AUTO: 3.5 X10E3/UL (ref 0.7–3.1)
LYMPHOCYTES NFR BLD AUTO: 34 %
MCH RBC QN AUTO: 29.3 PG (ref 26.6–33)
MCHC RBC AUTO-ENTMCNC: 33.6 G/DL (ref 31.5–35.7)
MCV RBC AUTO: 87 FL (ref 79–97)
MONOCYTES # BLD AUTO: 0.7 X10E3/UL (ref 0.1–0.9)
MONOCYTES NFR BLD AUTO: 7 %
NEUTROPHILS # BLD AUTO: 6 X10E3/UL (ref 1.4–7)
NEUTROPHILS NFR BLD AUTO: 57 %
PLATELET # BLD AUTO: 400 X10E3/UL (ref 150–450)
POTASSIUM SERPL-SCNC: 4.8 MMOL/L (ref 3.5–5.2)
PROT SERPL-MCNC: 7.6 G/DL (ref 6–8.5)
RBC # BLD AUTO: 4.44 X10E6/UL (ref 3.77–5.28)
SODIUM SERPL-SCNC: 138 MMOL/L (ref 134–144)
TRIGL SERPL-MCNC: 87 MG/DL (ref 0–149)
VLDLC SERPL CALC-MCNC: 17 MG/DL (ref 5–40)
WBC # BLD AUTO: 10.5 X10E3/UL (ref 3.4–10.8)

## 2019-11-11 ENCOUNTER — TELEPHONE (OUTPATIENT)
Dept: FAMILY MEDICINE CLINIC | Age: 64
End: 2019-11-11

## 2019-11-11 NOTE — TELEPHONE ENCOUNTER
[phone] spoke to patient notified we were calling her in regards to her lab results but patient already saw it on my chart patient understand no question in regards to labs

## 2019-11-11 NOTE — TELEPHONE ENCOUNTER
..Patient is returning a call back to the office.       .Best callback:  [phone] ([facility])

## 2020-01-06 DIAGNOSIS — I10 ESSENTIAL HYPERTENSION: ICD-10-CM

## 2020-01-09 RX ORDER — LOSARTAN POTASSIUM 100 MG/1
TABLET ORAL
Qty: 90 TAB | Refills: 1 | Status: SHIPPED | OUTPATIENT
Start: 2020-01-09 | End: 2020-04-16 | Stop reason: ALTCHOICE

## 2020-01-17 DIAGNOSIS — F41.9 ANXIETY: ICD-10-CM

## 2020-01-17 DIAGNOSIS — G47.9 SLEEP DISTURBANCE: ICD-10-CM

## 2020-01-21 RX ORDER — ALPRAZOLAM 0.25 MG/1
TABLET ORAL
Qty: 30 TAB | Refills: 0 | Status: SHIPPED | OUTPATIENT
Start: 2020-01-21 | End: 2020-03-31 | Stop reason: SDUPTHER

## 2020-03-10 DIAGNOSIS — I10 ESSENTIAL HYPERTENSION: ICD-10-CM

## 2020-03-10 NOTE — TELEPHONE ENCOUNTER
.Pharmacy is requesting a 90 day supply on the medication. .  Requested Prescriptions     Pending Prescriptions Disp Refills    hydroCHLOROthiazide (HYDRODIURIL) 25 mg tablet 90 Tab 1             . Pharmacy on file verified          [address]          LOV: Wednesday, October 23, 2019  UOV: Wednesday, April 22, 2020

## 2020-03-12 RX ORDER — HYDROCHLOROTHIAZIDE 25 MG/1
TABLET ORAL
Qty: 90 TAB | Refills: 1 | Status: SHIPPED | OUTPATIENT
Start: 2020-03-12 | End: 2020-07-02 | Stop reason: SDUPTHER

## 2020-03-31 DIAGNOSIS — G47.9 SLEEP DISTURBANCE: ICD-10-CM

## 2020-03-31 DIAGNOSIS — F41.9 ANXIETY: ICD-10-CM

## 2020-04-01 RX ORDER — ALPRAZOLAM 0.25 MG/1
TABLET ORAL
Qty: 30 TAB | Refills: 0 | Status: SHIPPED | OUTPATIENT
Start: 2020-04-01 | End: 2020-07-18 | Stop reason: SDUPTHER

## 2020-04-01 NOTE — TELEPHONE ENCOUNTER
LOV 10/23/2019  The Prescription Monitoring Program has been reviewed for recent activity regarding controlled substances for this patient.      Per  last refill # 30 01/21/2020

## 2020-04-15 NOTE — TELEPHONE ENCOUNTER
Pharmacy requesting alternative to losartan 100mg due to backorder. Attached is an rx for olmesartan 40mg if appropriate or change to another. Last Visit: 10/23/19  MD Soliman  Next Appointment: 4/22/20  MD Soliman  Previous Refill Encounter(s): 1/9/20 90 + 1 refill    Requested Prescriptions     Pending Prescriptions Disp Refills    olmesartan (BENICAR) 40 mg tablet 90 Tab 1     Sig: Take 1 Tab by mouth daily.

## 2020-04-16 ENCOUNTER — TELEPHONE (OUTPATIENT)
Dept: FAMILY MEDICINE CLINIC | Age: 65
End: 2020-04-16

## 2020-04-16 RX ORDER — OLMESARTAN MEDOXOMIL 40 MG/1
40 TABLET ORAL DAILY
Qty: 90 TAB | Refills: 1 | Status: SHIPPED | OUTPATIENT
Start: 2020-04-16 | End: 2020-07-02 | Stop reason: SDUPTHER

## 2020-04-22 ENCOUNTER — VIRTUAL VISIT (OUTPATIENT)
Dept: FAMILY MEDICINE CLINIC | Age: 65
End: 2020-04-22

## 2020-04-22 DIAGNOSIS — E78.5 HYPERLIPIDEMIA WITH TARGET LDL LESS THAN 130: ICD-10-CM

## 2020-04-22 DIAGNOSIS — E66.9 OBESITY, CLASS I, BMI 30-34.9: ICD-10-CM

## 2020-04-22 DIAGNOSIS — Z13.820 OSTEOPOROSIS SCREENING: ICD-10-CM

## 2020-04-22 DIAGNOSIS — I10 ESSENTIAL HYPERTENSION: Primary | ICD-10-CM

## 2020-04-22 DIAGNOSIS — R73.03 PREDIABETES: ICD-10-CM

## 2020-04-22 DIAGNOSIS — Z78.0 POST-MENOPAUSAL: ICD-10-CM

## 2020-04-22 DIAGNOSIS — C50.211 MALIGNANT NEOPLASM OF UPPER-INNER QUADRANT OF RIGHT FEMALE BREAST, UNSPECIFIED ESTROGEN RECEPTOR STATUS (HCC): ICD-10-CM

## 2020-04-22 NOTE — PROGRESS NOTES
Kendra Mejia is a 72 y.o. female who was seen by synchronous (real-time) audio-video technology on 4/22/2020. Consent:  Services were provided through a video synchronous discussion virtually to substitute for in-person appointment. She and/or her healthcare decision maker is aware that this patient-initiated Telehealth encounter is a billable service, with coverage as determined by her insurance carrier. She is aware that she may receive a bill and has provided verbal consent to proceed: Yes    I was in the office while conducting this encounter. Subjective:   Kendra Mejia was seen for follow up on chronic conditions    Hypertension: Pt measures bp at home and reports that bp was 143/77 today. Pt continues with Benicar 40mg/d, Hctc 25mg/d    Prediabetes: A1c was 5.8 on 10/23/2019. Condition is controlled without medication. Hyperlipidemia: Lipid panel on 10/23/2019 notable for total cholesterol 250, HDL 99, , and triglycerides 87. Pt does not take medication. Obesity: Pt reports that she has been running about 2 miles every day on the treadmill. However, she notes that she has gained weight and is currently at 190lb. Notes that her lowest weight was 176lb last year and her highest was 201lb. Pt suspects current weight increase is from stress-eating and cooking. I have reviewed/discussed the above normal BMI with the patient. I have recommended the following interventions: dietary management education, guidance, and counseling, encourage exercise and monitor weight . Hx of Breast cancer: Pt is under care of specialist.     Healthcare Maintenance: Pt has mammogram scheduled for 7/7. Pt also needs Shingrix vaccination and DEXA scan done. Pt's pnemococcol immunization is due. Pt also needs Glaucoma screening done. Prior to Admission medications    Medication Sig Start Date End Date Taking? Authorizing Provider   olmesartan (BENICAR) 40 mg tablet Take 1 Tab by mouth daily. 4/16/20  Yes Julio Kelsey MD   ALPRAZolam (XANAX) 0.25 mg tablet take 1 tablet by mouth once daily if needed for anxiety 4/1/20  Yes Julio Kelsey MD   hydroCHLOROthiazide (HYDRODIURIL) 25 mg tablet TAKE 1 TABLET BY MOUTH EVERY DAY 3/12/20  Yes Julio Kelsey MD   biotin 2,500 mcg tab Take  by mouth. 7/22/15  Yes Julio Kelsey MD   triamcinolone (NASACORT AQ) 55 mcg nasal inhaler 2 Sprays by Both Nostrils route daily. Yes Provider, Historical   aspirin 81 mg chewable tablet Take 1 Tab by mouth daily. 11/25/13  Yes Julio Kelsey MD   cyanocobalamin (VITAMIN B-12) 1,000 mcg tablet Take 1,000 mcg by mouth daily. Yes Provider, Historical   MULTIVITS-MIN/FA/CA CARB/VIT K (ONE-A-DAY WOMEN'S 50+ PO) Take 1 Tab by mouth daily. Yes Provider, Historical   loratadine (CLARITIN) 10 mg tablet Take 10 mg by mouth daily.    Yes Provider, Historical     No Known Allergies  Past Medical History:   Diagnosis Date    Allergic rhinitis     Arrhythmia     Breast cancer [address] [address] 2010    right breast    Cancer ([facility] [address] 6/2010    right breast DCIS    Diverticulitis     Hypertension     Retinal tear of left eye 2/6/14     Past Surgical History:   Procedure Laterality Date    HX BREAST BIOPSY Bilateral 1970 70's and 2010 benign (surgical)    HX BREAST BIOPSY Left 05/2010    benign mri bx    HX BREAST BIOPSY Right 03/2010    positive stereo    HX BREAST LUMPECTOMY  4/2010    right breast    HX OTHER SURGICAL Left 2/6/14     Retinal tear repair by Dr. Elliott Child Telma Isnahun HX TONSILLECTOMY       Family History   Problem Relation Age of Onset    Other Mother         overweight, gout    Heart Disease Mother         chf    Hypertension Mother     Glaucoma Father     Diabetes Father     Hypertension Father     Osteoporosis Paternal Grandmother     Diabetes Paternal Aunt 44        breast cancer     Social History     Tobacco Use    Smoking status: Never Smoker    Smokeless tobacco: Never Used   Substance Use Topics    Alcohol use: No        Review of Systems   Constitutional: Negative for chills and fever. HENT: Negative for hearing loss and tinnitus. Eyes: Negative for blurred vision and double vision. Respiratory: Negative for cough and shortness of breath. Cardiovascular: Negative for chest pain and palpitations. Gastrointestinal: Negative for nausea and vomiting. Genitourinary: Negative for dysuria and frequency. Musculoskeletal: Negative for back pain and falls. Skin: Negative for itching and rash. Neurological: Negative for dizziness, loss of consciousness and headaches. Endo/Heme/Allergies: Negative. Psychiatric/Behavioral: Negative for depression. The patient is not nervous/anxious. PHYSICAL EXAMINATION:  Vital Signs: (As obtained by patient/caregiver at home)  There were no vitals taken for this visit.      Constitutional: [x] Appears well-developed and well-nourished [x] No apparent distress      [] Abnormal -     Mental status: [x] Alert and awake  [x] Oriented to person/place/time [x] Able to follow commands    [] Abnormal -     Eyes:   EOM    [x]  Normal    [] Abnormal -   Sclera  [x]  Normal    [] Abnormal -          Discharge [x]  None visible   [] Abnormal -     HENT: [x] Normocephalic, atraumatic  [] Abnormal -   [x] Mouth/Throat: Mucous membranes are moist    External Ears [x] Normal  [] Abnormal -    Neck: [x] No visualized mass [] Abnormal -     Pulmonary/Chest: [x] Respiratory effort normal   [x] No visualized signs of difficulty breathing or respiratory distress        [] Abnormal -      Musculoskeletal:   [x] Normal gait with no signs of ataxia         [x] Normal range of motion of neck        [] Abnormal -     Neurological:        [x] No Facial Asymmetry (Cranial nerve 7 motor function) (limited exam due to video visit)          [x] No gaze palsy        [] Abnormal -          Skin:        [x] No significant exanthematous lesions or discoloration noted on facial skin         [] Abnormal -            Psychiatric:       [x] Normal Affect [] Abnormal -        [x] No Hallucinations    Other pertinent observable physical exam findings:-    Assessment & Plan:   Diagnoses and all orders for this visit:    1. Essential hypertension  Pt recorded bp at home and noted bp was 143/77 today. Pt advised to continue with Benicar 40mg/d, Hctc 87AO/S  -     METABOLIC PANEL, COMPREHENSIVE; Future    2. Prediabetes  A1c was 5.8 on 10/23/2019. Condition controlled without medication. Pt will come in for lab work once COVID-19 circumstances resolve. -     METABOLIC PANEL, COMPREHENSIVE; Future  -     HEMOGLOBIN A1C WITH EAG; Future    3. Hyperlipidemia with target LDL less than 130  Last LDL was 134, and not at goal. Labwork will be done once COVID-19 circumstances resolve. -     LIPID PANEL; Future    4. Obesity, Class I, BMI 30-34.9   I have reviewed/discussed the above normal BMI with the patient. I have recommended the following interventions: dietary management education, guidance, and counseling, encourage exercise and monitor weight . 5. Malignant neoplasm of upper-inner quadrant of right female breast, unspecified estrogen receptor status ([facility] [address]  Pt is under care of specialist.    6. Post-menopausal  Pt agrees with DEXA scan once COVID-19 circumstances resolve. -     DEXA BONE DENSITY STUDY AXIAL; Future    7. Osteoporosis screening  Pt agrees with DEXA scan once COVID-19 circumstances resolve. -     DEXA BONE DENSITY STUDY AXIAL; Future      We discussed the expected course, resolution and complications of the diagnosis(es) in detail. Medication risks, benefits, costs, interactions, and alternatives were discussed as indicated. I advised her to contact the office if her condition worsens, changes or fails to improve as anticipated. She expressed understanding with the diagnosis(es) and plan.      Pursuant to the emergency declaration under the Tiara Buckley Act and [employer], 8426 waiver authority and the [employer] and [employer] Act, this Virtual Visit was conducted, with patient's consent, to reduce the patient's risk of exposure to COVID-19 and provide continuity of care for an established patient. Services were provided through a video synchronous discussion virtually to substitute for in-person clinic visit. Written by laureano Spivey, as dictated by [place] M.D.    3:10 PM - 3:20    Total time spent with the patient 10 minutes, greater than 50% of time spent counseling patient.

## 2020-05-26 ENCOUNTER — HOSPITAL ENCOUNTER (INPATIENT)
Age: 65
LOS: 1 days | Discharge: HOME | DRG: 083 | End: 2020-05-27
Attending: EMERGENCY MEDICINE | Admitting: FAMILY MEDICINE
Payer: COMMERCIAL

## 2020-05-26 ENCOUNTER — APPOINTMENT (OUTPATIENT)
Dept: CT IMAGING | Age: 65
DRG: 083 | End: 2020-05-26
Attending: EMERGENCY MEDICINE
Payer: COMMERCIAL

## 2020-05-26 ENCOUNTER — APPOINTMENT (OUTPATIENT)
Dept: GENERAL RADIOLOGY | Age: 65
DRG: 083 | End: 2020-05-26
Attending: EMERGENCY MEDICINE
Payer: COMMERCIAL

## 2020-05-26 DIAGNOSIS — S62.101A CLOSED FRACTURE OF RIGHT WRIST, INITIAL ENCOUNTER: Primary | ICD-10-CM

## 2020-05-26 DIAGNOSIS — I60.9 SUBARACHNOID BLEED (HCC): ICD-10-CM

## 2020-05-26 LAB
ALBUMIN SERPL-MCNC: 4.3 G/DL (ref 3.5–5)
ALBUMIN/GLOB SERPL: 1 {RATIO} (ref 1.1–2.2)
ALP SERPL-CCNC: 104 U/L (ref 45–117)
ALT SERPL-CCNC: 31 U/L (ref 12–78)
ANION GAP SERPL CALC-SCNC: 8 MMOL/L (ref 5–15)
AST SERPL-CCNC: 20 U/L (ref 15–37)
BASOPHILS # BLD: 0 K/UL (ref 0–0.1)
BASOPHILS NFR BLD: 0 % (ref 0–1)
BILIRUB SERPL-MCNC: 0.4 MG/DL (ref 0.2–1)
BUN SERPL-MCNC: 28 MG/DL (ref 6–20)
BUN/CREAT SERPL: 23 (ref 12–20)
CALCIUM SERPL-MCNC: 10.2 MG/DL (ref 8.5–10.1)
CHLORIDE SERPL-SCNC: 102 MMOL/L (ref 97–108)
CO2 SERPL-SCNC: 25 MMOL/L (ref 21–32)
COMMENT, HOLDF: NORMAL
CREAT SERPL-MCNC: 1.21 MG/DL (ref 0.55–1.02)
DIFFERENTIAL METHOD BLD: ABNORMAL
EOSINOPHIL # BLD: 0.2 K/UL (ref 0–0.4)
EOSINOPHIL NFR BLD: 2 % (ref 0–7)
ERYTHROCYTE [DISTWIDTH] IN BLOOD BY AUTOMATED COUNT: 13.3 % (ref 11.5–14.5)
GLOBULIN SER CALC-MCNC: 4.1 G/DL (ref 2–4)
GLUCOSE SERPL-MCNC: 116 MG/DL (ref 65–100)
HCT VFR BLD AUTO: 40.4 % (ref 35–47)
HGB BLD-MCNC: 13.1 G/DL (ref 11.5–16)
IMM GRANULOCYTES # BLD AUTO: 0 K/UL (ref 0–0.04)
IMM GRANULOCYTES NFR BLD AUTO: 0 % (ref 0–0.5)
INR PPP: 1 (ref 0.9–1.1)
LYMPHOCYTES # BLD: 2.7 K/UL (ref 0.8–3.5)
LYMPHOCYTES NFR BLD: 30 % (ref 12–49)
MCH RBC QN AUTO: 28.9 PG (ref 26–34)
MCHC RBC AUTO-ENTMCNC: 32.4 G/DL (ref 30–36.5)
MCV RBC AUTO: 89.2 FL (ref 80–99)
MONOCYTES # BLD: 0.5 K/UL (ref 0–1)
MONOCYTES NFR BLD: 6 % (ref 5–13)
NEUTS SEG # BLD: 5.7 K/UL (ref 1.8–8)
NEUTS SEG NFR BLD: 62 % (ref 32–75)
NRBC # BLD: 0 K/UL (ref 0–0.01)
NRBC BLD-RTO: 0 PER 100 WBC
PLATELET # BLD AUTO: 405 K/UL (ref 150–400)
PMV BLD AUTO: 10.4 FL (ref 8.9–12.9)
POTASSIUM SERPL-SCNC: 3.8 MMOL/L (ref 3.5–5.1)
PROT SERPL-MCNC: 8.4 G/DL (ref 6.4–8.2)
PROTHROMBIN TIME: 10.1 SEC (ref 9–11.1)
RBC # BLD AUTO: 4.53 M/UL (ref 3.8–5.2)
SAMPLES BEING HELD,HOLD: NORMAL
SODIUM SERPL-SCNC: 135 MMOL/L (ref 136–145)
WBC # BLD AUTO: 9 K/UL (ref 3.6–11)

## 2020-05-26 PROCEDURE — 65660000000 HC RM CCU STEPDOWN

## 2020-05-26 PROCEDURE — 72220 X-RAY EXAM SACRUM TAILBONE: CPT

## 2020-05-26 PROCEDURE — 74011250637 HC RX REV CODE- 250/637: Performed by: FAMILY MEDICINE

## 2020-05-26 PROCEDURE — 85025 COMPLETE CBC W/AUTO DIFF WBC: CPT

## 2020-05-26 PROCEDURE — 96374 THER/PROPH/DIAG INJ IV PUSH: CPT

## 2020-05-26 PROCEDURE — 96375 TX/PRO/DX INJ NEW DRUG ADDON: CPT

## 2020-05-26 PROCEDURE — 74011000250 HC RX REV CODE- 250: Performed by: FAMILY MEDICINE

## 2020-05-26 PROCEDURE — 73110 X-RAY EXAM OF WRIST: CPT

## 2020-05-26 PROCEDURE — 80053 COMPREHEN METABOLIC PANEL: CPT

## 2020-05-26 PROCEDURE — 99285 EMERGENCY DEPT VISIT HI MDM: CPT

## 2020-05-26 PROCEDURE — 75810000053 HC SPLINT APPLICATION

## 2020-05-26 PROCEDURE — 72125 CT NECK SPINE W/O DYE: CPT

## 2020-05-26 PROCEDURE — 74011250636 HC RX REV CODE- 250/636: Performed by: FAMILY MEDICINE

## 2020-05-26 PROCEDURE — 85610 PROTHROMBIN TIME: CPT

## 2020-05-26 PROCEDURE — 72100 X-RAY EXAM L-S SPINE 2/3 VWS: CPT

## 2020-05-26 PROCEDURE — 73521 X-RAY EXAM HIPS BI 2 VIEWS: CPT

## 2020-05-26 PROCEDURE — 70450 CT HEAD/BRAIN W/O DYE: CPT

## 2020-05-26 PROCEDURE — 74011250636 HC RX REV CODE- 250/636: Performed by: EMERGENCY MEDICINE

## 2020-05-26 PROCEDURE — 96376 TX/PRO/DX INJ SAME DRUG ADON: CPT

## 2020-05-26 RX ORDER — ACETAMINOPHEN 325 MG/1
650 TABLET ORAL
Status: DISCONTINUED | OUTPATIENT
Start: 2020-05-26 | End: 2020-05-27 | Stop reason: HOSPADM

## 2020-05-26 RX ORDER — MORPHINE SULFATE 2 MG/ML
4 INJECTION, SOLUTION INTRAMUSCULAR; INTRAVENOUS
Status: COMPLETED | OUTPATIENT
Start: 2020-05-26 | End: 2020-05-26

## 2020-05-26 RX ORDER — ONDANSETRON 2 MG/ML
4 INJECTION INTRAMUSCULAR; INTRAVENOUS
Status: DISCONTINUED | OUTPATIENT
Start: 2020-05-26 | End: 2020-05-27 | Stop reason: HOSPADM

## 2020-05-26 RX ORDER — ONDANSETRON 2 MG/ML
4 INJECTION INTRAMUSCULAR; INTRAVENOUS ONCE
Status: COMPLETED | OUTPATIENT
Start: 2020-05-26 | End: 2020-05-26

## 2020-05-26 RX ORDER — NALOXONE HYDROCHLORIDE 0.4 MG/ML
0.4 INJECTION, SOLUTION INTRAMUSCULAR; INTRAVENOUS; SUBCUTANEOUS AS NEEDED
Status: DISCONTINUED | OUTPATIENT
Start: 2020-05-26 | End: 2020-05-27 | Stop reason: HOSPADM

## 2020-05-26 RX ORDER — MORPHINE SULFATE 2 MG/ML
2 INJECTION, SOLUTION INTRAMUSCULAR; INTRAVENOUS
Status: COMPLETED | OUTPATIENT
Start: 2020-05-26 | End: 2020-05-26

## 2020-05-26 RX ORDER — HYDRALAZINE HYDROCHLORIDE 20 MG/ML
10 INJECTION INTRAMUSCULAR; INTRAVENOUS
Status: DISCONTINUED | OUTPATIENT
Start: 2020-05-26 | End: 2020-05-27 | Stop reason: HOSPADM

## 2020-05-26 RX ORDER — OXYCODONE AND ACETAMINOPHEN 5; 325 MG/1; MG/1
1 TABLET ORAL
Status: DISCONTINUED | OUTPATIENT
Start: 2020-05-26 | End: 2020-05-27 | Stop reason: HOSPADM

## 2020-05-26 RX ORDER — ALPRAZOLAM 0.25 MG/1
0.25 TABLET ORAL 2 TIMES DAILY
Status: DISCONTINUED | OUTPATIENT
Start: 2020-05-26 | End: 2020-05-27 | Stop reason: HOSPADM

## 2020-05-26 RX ORDER — SODIUM CHLORIDE 9 MG/ML
75 INJECTION, SOLUTION INTRAVENOUS CONTINUOUS
Status: DISCONTINUED | OUTPATIENT
Start: 2020-05-26 | End: 2020-05-27 | Stop reason: HOSPADM

## 2020-05-26 RX ORDER — ACETAMINOPHEN 650 MG/1
650 SUPPOSITORY RECTAL
Status: DISCONTINUED | OUTPATIENT
Start: 2020-05-26 | End: 2020-05-27 | Stop reason: HOSPADM

## 2020-05-26 RX ADMIN — OXYCODONE HYDROCHLORIDE AND ACETAMINOPHEN 1 TABLET: 5; 325 TABLET ORAL at 14:57

## 2020-05-26 RX ADMIN — ALPRAZOLAM 0.25 MG: 0.25 TABLET ORAL at 19:33

## 2020-05-26 RX ADMIN — SODIUM CHLORIDE 75 ML/HR: 900 INJECTION, SOLUTION INTRAVENOUS at 19:35

## 2020-05-26 RX ADMIN — ONDANSETRON 4 MG: 2 INJECTION INTRAMUSCULAR; INTRAVENOUS at 11:08

## 2020-05-26 RX ADMIN — OXYCODONE HYDROCHLORIDE AND ACETAMINOPHEN 1 TABLET: 5; 325 TABLET ORAL at 19:34

## 2020-05-26 RX ADMIN — MORPHINE SULFATE 4 MG: 2 INJECTION, SOLUTION INTRAMUSCULAR; INTRAVENOUS at 11:08

## 2020-05-26 RX ADMIN — MORPHINE SULFATE 2 MG: 2 INJECTION, SOLUTION INTRAMUSCULAR; INTRAVENOUS at 09:17

## 2020-05-26 RX ADMIN — FAMOTIDINE 20 MG: 10 INJECTION, SOLUTION INTRAVENOUS at 19:33

## 2020-05-26 NOTE — ED PROVIDER NOTES
Please note that this dictation was completed with [employer] the computer voice recognition software.  Quite often unanticipated grammatical, syntax, homophones, and other interpretive errors are inadvertently transcribed by the computer software.  Please disregard these errors.  Please excuse any errors that have escaped final proofreading.     54-year-old female presenting to the emergency room for evaluation of right wrist pain, tailbone pain and head injury. This occurred just prior to arrival.  Patient was working outside on a ladder when she fell. Patient states she fell approximately 2 rungs up. She did strike her head. Unsure if she had loss of consciousness. Complaining of pain in the right wrist.  No chest pain, shortness breath difficulty breathing. No blurry vision or double vision. No abdominal pain, nausea or vomiting. No numbness or tingling of the extremities. No hip or pelvic pain. Pain is mainly located in the wrist.  It is worse with any attempted movement at the wrist.  She is not taking any medications prior to arrival.  Pain is rated 7 out of 10. No alleviating factors    Social hx  Nonsmoker  No alcohol    The history is provided by the patient. No  was used. Fall   Associated symptoms include headaches. Pertinent negatives include no numbness, no abdominal pain, no nausea and no vomiting. Wrist Pain    Associated symptoms include neck pain. Pertinent negatives include no numbness.         Past Medical History:   Diagnosis Date    Allergic rhinitis     Arrhythmia     Breast cancer ([address] 2010    right breast    Cancer ([address] 6/2010    right breast DCIS    Diverticulitis     Hypertension     Retinal tear of left eye 2/6/14       Past Surgical History:   Procedure Laterality Date    HX BREAST BIOPSY Bilateral 1970 70's and 2010 benign (surgical)    HX BREAST BIOPSY Left 05/2010    benign mri bx    HX BREAST BIOPSY Right 03/2010    positive stereo    HX BREAST LUMPECTOMY  4/2010    right breast    HX OTHER SURGICAL Left 2/6/14     Retinal tear repair by Dr. Elsi Rod HX TONSILLECTOMY           Family History:   Problem Relation Age of Onset    Other Mother         overweight, gout    Heart Disease Mother         chf    Hypertension Mother     Glaucoma Father     Diabetes Father     Hypertension Father     Osteoporosis Paternal Grandmother     Diabetes Paternal Aunt 36        breast cancer       Social History     Socioeconomic History    Marital status:      Spouse name: Not on file    Number of children: Not on file    Years of education: Not on file    Highest education level: Not on file   Occupational History    Not on file   Social Needs    Financial resource strain: Not on file    Food insecurity     Worry: Not on file     Inability: Not on file    Transportation needs     Medical: Not on file     Non-medical: Not on file   Tobacco Use    Smoking status: Never Smoker    Smokeless tobacco: Never Used   Substance and Sexual Activity    Alcohol use: No    Drug use: No    Sexual activity: Yes     Partners: Male   Lifestyle    Physical activity     Days per week: Not on file     Minutes per session: Not on file    Stress: Not on file   Relationships    Social connections     Talks on phone: Not on file     Gets together: Not on file     Attends Rastafari service: Not on file     Active member of club or organization: Not on file     Attends meetings of clubs or organizations: Not on file     Relationship status: Not on file    Intimate partner violence     Fear of current or ex partner: Not on file     Emotionally abused: Not on file     Physically abused: Not on file     Forced sexual activity: Not on file   Other Topics Concern     Service No    Blood Transfusions No    Caffeine Concern No    Occupational Exposure No    Hobby Hazards No    Sleep Concern No    Stress Concern No  Weight Concern Yes    Special Diet No    Back Care No    Exercise No    Bike Helmet No    Seat Belt Yes    Self-Exams Yes   Social History Narrative    Not on file         ALLERGIES: Patient has no known allergies. Review of Systems   Constitutional: Negative for chills and fatigue. HENT: Negative for trouble swallowing. Eyes: Negative for photophobia and visual disturbance. Respiratory: Negative for cough, chest tightness and shortness of breath. Cardiovascular: Negative for chest pain. Gastrointestinal: Negative for abdominal pain, nausea and vomiting. Musculoskeletal: Positive for neck pain. Negative for arthralgias, joint swelling, myalgias and neck stiffness. Skin: Negative for color change and rash. Neurological: Positive for headaches. Negative for dizziness, weakness, light-headedness and numbness. All other systems reviewed and are negative. Vitals:    05/26/20 0859   BP: 125/75   Pulse: 80   Resp: 20   Temp: 97.7 °F (36.5 °C)   SpO2: 99%            Physical Exam  Vitals signs and nursing note reviewed. Constitutional:       General: She is not in acute distress. Appearance: Normal appearance. She is well-developed. HENT:      Head: Normocephalic and atraumatic. Right Ear: External ear normal.      Left Ear: External ear normal.      Nose: Nose normal.      Mouth/Throat:      Mouth: Mucous membranes are moist.   Eyes:      General:         Right eye: No discharge. Left eye: No discharge. Conjunctiva/sclera: Conjunctivae normal.      Pupils: Pupils are equal, round, and reactive to light. Neck:      Musculoskeletal: Normal range of motion and neck supple. Comments: NO midline tenderness to palpation of cspine  Cardiovascular:      Rate and Rhythm: Normal rate and regular rhythm. Pulmonary:      Effort: Pulmonary effort is normal. No respiratory distress. Breath sounds: Normal breath sounds. Chest:      Chest wall: No tenderness. Abdominal:      General: Bowel sounds are normal. There is no distension. Palpations: Abdomen is soft. Abdomen is not rigid. There is no hepatomegaly, splenomegaly or mass. Tenderness: There is no abdominal tenderness. There is no right CVA tenderness, left CVA tenderness, guarding or rebound. Negative signs include Bran's sign and McBurney's sign. Hernia: No hernia is present. Musculoskeletal: Normal range of motion. General: No tenderness. Comments: Right Wrist: no redness, or warmth.   + soft tissue swelling, no ecchymosis. no deformity. Pt tender to palpation along dorsal surface over distal radius and ulna. No pain over carpals. No snuffbox or axial load pain. 2+ radial pulse, cap refill brisk < 3 seconds. Sensation intact to all fingers. 5/5  strength. 5/5 ab/adduction of fingers. Full range of motion limited by pain. Skin:     General: Skin is warm and dry. Findings: No erythema or rash. Neurological:      General: No focal deficit present. Mental Status: She is alert and oriented to person, place, and time. Cranial Nerves: No cranial nerve deficit. Sensory: No sensory deficit. Motor: No weakness or abnormal muscle tone. Coordination: Coordination normal.      Deep Tendon Reflexes: Reflexes are normal and symmetric. Reflexes normal.   Psychiatric:         Mood and Affect: Mood normal.         Behavior: Behavior normal.         Thought Content: Thought content normal.         Judgment: Judgment normal.          MDM  Number of Diagnoses or Management Options  Closed fracture of right wrist, initial encounter:   Subarachnoid bleed [facility]):   Diagnosis management comments: Assessment and plan: This is a 40-year-old female presenting to the emergency room for evaluation of right wrist injury and pain tailbone pain status post fall. She has right wrist injury with swelling. She is alert and oriented. Neurologically intact.   Abdomen is soft and nontender. Chest wall is nontender. No midline tenderness to palpation of the spine. Plan: CT x-ray pain control    1:23 PM  Patient is being admitted to the hospital.  The results of their tests and reasons for their admission have been discussed with them and/or available family. They convey agreement and understanding for the need to be admitted and for their admission diagnosis. Consultation has been made now with the inpatient physician Dr. Silvina Gasca for hospitalization. 1:37 PM  Pt case including HPI, PE, and all available lab and radiology results has been discussed with neurosurgery Dr. Cyn Hicks. Will follow         Amount and/or Complexity of Data Reviewed  Discuss the patient with other providers: yes (ER attending-Yeni)    Patient Progress  Patient progress: stable         Procedures    Hospitalist [place] Text for Admission  1:11 PM    ED Room Number: FL03/59  Patient Name and age:  Arti Arevalo 72 y.o.  female  Working Diagnosis:   1. Subarachnoid bleed (HCC)    2. Closed fracture of right wrist, initial encounter      Readmission: no  Isolation Requirements:  yes  Recommended Level of Care:  med/surg  Code Status:  full  Other:  Fall off ladder (2-3 step). Trace bilateral subarrachnoid and wrist fracture. Neurosurgery paged    Department:[facility] - [phone]      Pt case including HPI, PE, and all available lab and radiology results has been discussed with attending physician. Opportunity to evaluate patient has been provided to ER attending.

## 2020-05-26 NOTE — ED NOTES
TRANSFER - OUT REPORT:    Verbal report given to Amirah Otero RN (name) on Samantha Araya  being transferred to [facility],  (unit) for routine progression of care       Report consisted of patients Situation, Background, Assessment and   Recommendations(SBAR). Information from the following report(s) SBAR, ED Summary and MAR was reviewed with the receiving nurse. Lines:   Peripheral IV 05/26/20 Left Antecubital (Active)   Site Assessment Clean, dry, & intact 5/26/2020  9:17 AM   Phlebitis Assessment 0 5/26/2020  9:17 AM   Infiltration Assessment 0 5/26/2020  9:17 AM   Dressing Status Clean, dry, & intact 5/26/2020  9:17 AM        Opportunity for questions and clarification was provided.       Patient transported with:   Monitor  Registered Nurse

## 2020-05-26 NOTE — H&P
[address]  HISTORY AND PHYSICAL    Name:  Elizabeth Joshua  MR#:  [id number]  :  1955  ACCOUNT #:  [de-identified]  ADMIT DATE:  2020    CHIEF COMPLAINT:  Fall. HISTORY OF PRESENT ILLNESS:  The patient is a 59-year-old female with a past medical history of allergic rhinitis, breast cancer, diverticulitis, hypertension and retinal tear of the left eye, who presents to the hospital with the above-mentioned symptom. History was primarily obtained from the patient. The patient reports that she was working outside on a ladder when she had a fall. The patient reports that she fell backwards. She approximately fell 2 rungs up, did strike her head and her back, unsure if she lost consciousness. The patient reports immediately she started noticing significant pain in her right wrist, also had some headache associated with neck pain and sacral pain. The patient reports that she got concerned and decided to come to the hospital.  In the ER, the patient had a CT scan done, which showed some trace subarachnoid hemorrhage associated with right radial fracture and was requested to be admitted under the hospitalist service. The patient denies any other complaints or problems. Denies any blurry vision, trouble swallowing, trouble with speech, chest pain, shortness of breath, cough, fever, chills, abdominal pain, constipation, diarrhea, urinary symptoms, focal or generalized neurological weakness, recent travel, sick contacts, falls besides this one, injuries, hematemesis, melena, hemoptysis, hematuria or any other concerns or problems. PAST MEDICAL HISTORY:  See above. HOME MEDICATIONS:  Currently, the patient is on  1. Benicar 40 mg daily. 2.  Xanax 0.25 mg daily as needed for anxiety. 3.  Hydrochlorothiazide 25 mg daily. 4.  Biotin. 5.  Nasacort. 6.  Aspirin 81 mg daily. 7.  Cyanocobalamin. 8.  Multivitamin 1 tablet. 9.   mg daily.     ALLERGIES:  NO KNOWN DRUG ALLERGIES. SOCIAL HISTORY:  No tobacco, no alcohol, no IV drug abuse. Lives at home. FAMILY HISTORY:  Mother had history of gout, CHF, hypertension. Father had history of glaucoma and diabetes. REVIEW OF SYMPTOMS:  All systems were reviewed and were found to be essentially negative except for the symptom mentioned above. PHYSICAL EXAMINATION:  VITAL SIGNS:  Temperature 97.7, pulse 80, respiratory rate 20, blood pressure 122/66, pulse oximetry 95% on room air. GENERAL:  Alert x3, awake, moderately distressed, pleasant female, appears to be stated age. HEENT:  Pupils equal and reactive to light. Dry mucous membranes. Tympanic membranes clear. NECK:  Supple. CHEST:  Clear to auscultation bilaterally. HEART:  S1, S2 are heard. ABDOMEN:  Soft, nontender, nondistended. Bowel sounds are physiological.  EXTREMITIES:  No clubbing, no cyanosis, no edema. Right upper extremity in splint. NEURO/PSYCH:   Pleasant mood and affect. Cranial nerves II through XII grossly intact. Sensory grossly within normal limits. DTRs 2+ x4. Strength 5/5. SKIN:  Warm. LABORATORY DATA:  White count 9.0, hemoglobin 13.1, hematocrit 40.4, platelets 232. INR 1. Sodium 135, potassium 3.8, chloride 102, bicarbonate 25, anion gap 8, glucose 116, BUN 28, creatinine 1.21, calcium 10.2, bilirubin total 0.4, ALT 31, AST 20, alkaline phosphatase 104. CT of the head shows bilateral trace acute subarachnoid hemorrhage. CT of the C-spine shows no fracture or subluxation. X-ray of the right wrist shows comminuted, mildly impacted fracture, distal right radius. ASSESSMENT AND PLAN:  1. Bilateral subarachnoid hemorrhage:  The patient will be admitted on neurotelemetry unit. Appreciate Neurosurgical input. We will admit the patient on neurotelemetry bed. We will optimize blood pressure control, neurovascular checks. Any changes in neurovascular status will mandate emergent intervention. Hold aspirin for now.   IV hydration. Repeat CT scan of the head in the morning. Telemetry monitoring and further intervention per hospital course. Keppra for seizure prophylaxis and further intervention per hospital course. Continue to closely monitor. Reassess as needed. 2.  Acute kidney injury:  Likely prerenal.  Provide gentle IV hydration. Hold angiotensin converting enzyme/angiotension receptor blocker. Avoid nephrotoxic medication. Renally dose all other medications. Repeat creatinine in the morning. If persists, may consider getting nephrology consult and order renal imaging and further intervention per hospital course. Continue to closely monitor. Reassess as needed. 3.  Hypertension:  Currently controlled. We will provide hydralazine p.r.n. to strictly keep blood pressure less than 698 systolic. Continue to monitor. 4.  Hypercalcemia, mild: We will get ionized calcium level and further intervention per hospital course. Continue to closely monitor. 5.  Gastrointestinal/deep venous thrombosis prophylaxis:  The patient will be on sequential compression devices.       Allyn Villar MD      [id number]  D:  05/26/2020 13:47  T:  05/26/2020 16:25  JOB #:  [id number]

## 2020-05-26 NOTE — PROGRESS NOTES
Transition of Care Plan:      1. TBD/subject to change pending recommendations.   - Neurosurgery Consulted, CT to be completed in the morning    CM will continue to follow and assist with MICHELLE needs as they arise. Reason for Admission:   Subarachnoid bleed                    RUR Score:          0           Plan for utilizing home health:      Not at this time. TBD/subject to change pending recommendations. PCP: YES First and Last name:  Lindsay Veloz [phone]   Name of Practice:  [address] [place] [address]   Are you a current patient: Yes/No: Yes   Approximate date of last visit: Virtual Appointment 4/22/20. Current Advanced Directive/Advance Care Plan: No advance directive on file at this time. 72year old female, AOx4, independent with ADL's and IADL's. No DME utilized. Resides with spouse, Mark Fitzgerald. Michael Gonzalez [phone]/ [phone] in their own 2 story home with 5 steps to enter. Bedroom is located on 1st level of the home. Patient is employed with [employer] with no significant financial stressors or concerns at this time. Insurance verified: [facility]. [facility] located on [address] is utilized for prescriptions. Family able to transport at discharge. Care Management Interventions  PCP Verified by CM: Yes  Last Visit to PCP: 04/22/20  Palliative Care Criteria Met (RRAT>21 & CHF Dx)?: No  Mode of Transport at Discharge:  Other (see comment)(Family)  Transition of Care Consult (CM Consult): (No current CM consult or need at this time)  Discharge Durable Medical Equipment: No  Physical Therapy Consult: No  Occupational Therapy Consult: No  Speech Therapy Consult: No  Current Support Network: Own Home, Lives with Spouse  Confirm Follow Up Transport: Family  Discharge Location  Discharge Placement: Home with family assistance(TBD/subjectto change pending recommendations)    Alberto Hunter MSW/CRM  Care Management  2:12 PM

## 2020-05-26 NOTE — CONSULTS
[address]    Name:  Monie Ferguson  MR#:  [id number]  :  1955  ACCOUNT #:  [de-identified]  DATE OF SERVICE:  2020    REASON FOR CONSULTATION:  Status post fall with traumatic subarachnoid hemorrhage. HISTORY OF PRESENT ILLNESS:  The patient is a very nice 68-year-old female, who is generally healthy. 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 She was in her normal state of health until today when she was working on a ladder trimming hedges when she fell. She hit her head and her wrist.  She did have loss of consciousness. She said she came to and felt dizzy and a little bit confused. She has had some headache and pain in her coccyx and in her right wrist.  She was brought to the emergency room. Head CT shows trace bilateral traumatic subarachnoid hemorrhages. No evidence of mass-occupying lesion or subdural.  She has a broken wrist.  C spine was intact on CT scan. She is not having any tenderness. PAST MEDICAL HISTORY:  As noted above, hypertension. PAST SURGERIES:  None known. ALLERGIES:  NO KNOWN DRUG ALLERGIES. MEDICATIONS:  She is not on blood thinners. SOCIAL HISTORY:  She is , has good social support. Does not drink or smoke. REVIEW OF SYMPTOMS:  As above. NEUROLOGIC EXAMINATION:  The patient is a well-nourished, healthy-appearing female, lying in bed. She is complaining of pain in her coccyx and in her right wrist.  She has a GCS of 15 with no focal neurologic deficits. No major palpable tenderness of her C-spine. Good strength and sensation. IMAGING:  As above. IMPRESSION:  The patient has likely self-limiting traumatic head injury after a fall with loss of consciousness and subarachnoid hemorrhage. She has a GCS of 15. At this point, I recommend that she be admitted for observation with a follow-up CT in the morning. This would likely be a self-limiting injury. I think, it will be reasonable to keep her on q.4h. neuro check. She does not need an ICU bed from my standpoint, given the fact that she has no focal deficits and this is likely a self-limiting injury.       MD RAHUL [id number]  D:  05/26/2020 15:59  T:  05/26/2020 17:41  JOB #:  [id number]  CC:  MD Sherly Hough Che, MD

## 2020-05-26 NOTE — PROGRESS NOTES
Pharmacist Admission Medication Reconciliation:    Information obtained from: This medication history was obtained from Lizette Davies by phone. RxQuery data available¹:  YES    Summary:     Medications added: none  Medications deleted: aspirin    Active and held inpatient orders were reviewed and no changes are needed. ¹RxQuery pharmacy benefit data reflects medications processed through the patient's insurance, however this data does NOT capture whether the medication is currently being taken by the patient. Allergies:  Patient has no known allergies. Significant PMH/Disease States:   Past Medical History:   Diagnosis Date    Allergic rhinitis     Arrhythmia     Breast cancer ([facility] [address]     right breast    Cancer ([facility] [address] 2010    right breast DCIS    Diverticulitis     Hypertension     Retinal tear of left eye 14     Chief Complaint for this Admission:    Chief Complaint   Patient presents with    Fall    Wrist Pain     Prior to Admission Medications:   Prior to Admission Medications   Prescriptions Last Dose Informant Patient Reported? Taking? ALPRAZolam (XANAX) 0.25 mg tablet   No Yes   Sig: take 1 tablet by mouth once daily if needed for anxiety   MULTIVITS-MIN/FA/CA CARB/VIT K (ONE-A-DAY WOMEN'S 50+ PO) 2020  Yes Yes   Sig: Take 1 Tab by mouth daily. biotin 2,500 mcg tab 2020  Yes Yes   Sig: Take 2,500 mcg by mouth daily. cyanocobalamin (VITAMIN B-12) 1,000 mcg tablet 2020  Yes Yes   Sig: Take 1,000 mcg by mouth daily. hydroCHLOROthiazide (HYDRODIURIL) 25 mg tablet 2020  No Yes   Sig: TAKE 1 TABLET BY MOUTH EVERY DAY   loratadine (CLARITIN) 10 mg tablet 2020  Yes Yes   Sig: Take 10 mg by mouth daily. olmesartan (BENICAR) 40 mg tablet 2020  No Yes   Sig: Take 1 Tab by mouth daily. triamcinolone (NASACORT AQ) 55 mcg nasal inhaler 2020  Yes Yes   Si Sprays by Both Nostrils route daily.       Facility-Administered Medications: None       Thank you for allowing me to participate in this patient's care. Please call  or  with any questions. Gopal Abraham, Pharm. D., BCPS, BCPPS

## 2020-05-26 NOTE — ROUTINE PROCESS
TRANSFER - IN REPORT: 
 
Verbal report received from [facility] (name) on Samantha Araya  being received from ED (unit) for routine progression of care Report consisted of patients Situation, Background, Assessment and  
Recommendations(SBAR). Information from the following report(s) SBAR, Kardex, ED Summary, Procedure Summary, Intake/Output, MAR, Recent Results, Med Rec Status, Cardiac Rhythm NSR, Alarm Parameters , Procedure Verification, Quality Measures and Dual Neuro Assessment was reviewed with the receiving nurse. Opportunity for questions and clarification was provided. Assessment completed upon patients arrival to unit and care assumed.

## 2020-05-26 NOTE — PROGRESS NOTES
Problem: Subarachnoid Hemorrhage Stroke:Admission Day (0-24 HRS)  Goal: Activity/Safety  Outcome: Progressing Towards Goal  Goal: Consults, if ordered  Outcome: Progressing Towards Goal  Goal: Diagnostic Test/Procedures  Outcome: Progressing Towards Goal  Goal: Nutrition/Diet  Outcome: Progressing Towards Goal  Goal: Medications  Outcome: Progressing Towards Goal  Goal: Patient maintains clear airway/absence of aspiration  Outcome: Progressing Towards Goal  Goal: Treatments/Interventions/Procedures  Outcome: Progressing Towards Goal  Goal: Psychosocial  Outcome: Progressing Towards Goal  Goal: *Hemodynamically stable  Outcome: Progressing Towards Goal  Goal: *Progressive mobility and function  Description: i.e. ADLs  Outcome: Progressing Towards Goal

## 2020-05-26 NOTE — ED TRIAGE NOTES
Pt fell off a ladder pta , about 2-3 steps up, c/o wrist pain, unsure how she landed, also hit her head, +neck pain, denies loc, +right wrist deformity, +radial pulse

## 2020-05-27 ENCOUNTER — APPOINTMENT (OUTPATIENT)
Dept: CT IMAGING | Age: 65
DRG: 083 | End: 2020-05-27
Attending: FAMILY MEDICINE
Payer: COMMERCIAL

## 2020-05-27 VITALS
TEMPERATURE: 97.7 F | BODY MASS INDEX: 33.02 KG/M2 | WEIGHT: 204.6 LBS | OXYGEN SATURATION: 95 % | RESPIRATION RATE: 15 BRPM | HEART RATE: 75 BPM | DIASTOLIC BLOOD PRESSURE: 62 MMHG | SYSTOLIC BLOOD PRESSURE: 121 MMHG

## 2020-05-27 PROBLEM — S06.6X1A TRAUMATIC SUBARACHNOID BLEED WITH LOC OF 30 MINUTES OR LESS (HCC): Status: ACTIVE | Noted: 2020-05-26

## 2020-05-27 LAB
ANION GAP SERPL CALC-SCNC: 6 MMOL/L (ref 5–15)
ARTERIAL PATENCY WRIST A: ABNORMAL
BASE EXCESS BLD CALC-SCNC: 1 MMOL/L
BDY SITE: ABNORMAL
BUN SERPL-MCNC: 26 MG/DL (ref 6–20)
BUN/CREAT SERPL: 24 (ref 12–20)
CA-I BLD-SCNC: 1.27 MMOL/L (ref 1.12–1.32)
CALCIUM SERPL-MCNC: 8.8 MG/DL (ref 8.5–10.1)
CHLORIDE SERPL-SCNC: 105 MMOL/L (ref 97–108)
CHOLEST SERPL-MCNC: 204 MG/DL
CO2 SERPL-SCNC: 25 MMOL/L (ref 21–32)
CREAT SERPL-MCNC: 1.1 MG/DL (ref 0.55–1.02)
ERYTHROCYTE [DISTWIDTH] IN BLOOD BY AUTOMATED COUNT: 13.4 % (ref 11.5–14.5)
GAS FLOW.O2 O2 DELIVERY SYS: ABNORMAL L/MIN
GLUCOSE SERPL-MCNC: 104 MG/DL (ref 65–100)
HCO3 BLD-SCNC: 25.7 MMOL/L (ref 22–26)
HCT VFR BLD AUTO: 34.9 % (ref 35–47)
HDLC SERPL-MCNC: 83 MG/DL
HDLC SERPL: 2.5 {RATIO} (ref 0–5)
HGB BLD-MCNC: 11.2 G/DL (ref 11.5–16)
LDLC SERPL CALC-MCNC: 109 MG/DL (ref 0–100)
LIPID PROFILE,FLP: ABNORMAL
MCH RBC QN AUTO: 28.9 PG (ref 26–34)
MCHC RBC AUTO-ENTMCNC: 32.1 G/DL (ref 30–36.5)
MCV RBC AUTO: 89.9 FL (ref 80–99)
NRBC # BLD: 0 K/UL (ref 0–0.01)
NRBC BLD-RTO: 0 PER 100 WBC
PCO2 BLD: 42.4 MMHG (ref 35–45)
PH BLD: 7.39 [PH] (ref 7.35–7.45)
PLATELET # BLD AUTO: 288 K/UL (ref 150–400)
PMV BLD AUTO: 9.7 FL (ref 8.9–12.9)
PO2 BLD: 54 MMHG (ref 80–100)
POTASSIUM SERPL-SCNC: 4 MMOL/L (ref 3.5–5.1)
RBC # BLD AUTO: 3.88 M/UL (ref 3.8–5.2)
SAO2 % BLD: 87 % (ref 92–97)
SODIUM SERPL-SCNC: 136 MMOL/L (ref 136–145)
SPECIMEN TYPE: ABNORMAL
TRIGL SERPL-MCNC: 60 MG/DL (ref ?–150)
VLDLC SERPL CALC-MCNC: 12 MG/DL
WBC # BLD AUTO: 10.8 K/UL (ref 3.6–11)

## 2020-05-27 PROCEDURE — 85027 COMPLETE CBC AUTOMATED: CPT

## 2020-05-27 PROCEDURE — 70450 CT HEAD/BRAIN W/O DYE: CPT

## 2020-05-27 PROCEDURE — 80048 BASIC METABOLIC PNL TOTAL CA: CPT

## 2020-05-27 PROCEDURE — 97535 SELF CARE MNGMENT TRAINING: CPT

## 2020-05-27 PROCEDURE — 97165 OT EVAL LOW COMPLEX 30 MIN: CPT

## 2020-05-27 PROCEDURE — 82803 BLOOD GASES ANY COMBINATION: CPT

## 2020-05-27 PROCEDURE — 80061 LIPID PANEL: CPT

## 2020-05-27 PROCEDURE — 74011000250 HC RX REV CODE- 250: Performed by: FAMILY MEDICINE

## 2020-05-27 PROCEDURE — 74011250636 HC RX REV CODE- 250/636: Performed by: FAMILY MEDICINE

## 2020-05-27 PROCEDURE — 36415 COLL VENOUS BLD VENIPUNCTURE: CPT

## 2020-05-27 PROCEDURE — 74011250637 HC RX REV CODE- 250/637: Performed by: FAMILY MEDICINE

## 2020-05-27 RX ORDER — OXYCODONE AND ACETAMINOPHEN 5; 325 MG/1; MG/1
1 TABLET ORAL
Qty: 30 TAB | Refills: 0 | Status: SHIPPED | OUTPATIENT
Start: 2020-05-27 | End: 2020-06-10

## 2020-05-27 RX ORDER — ACETAMINOPHEN 325 MG/1
650 TABLET ORAL
Qty: 30 TAB | Refills: 0 | Status: SHIPPED
Start: 2020-05-27

## 2020-05-27 RX ADMIN — OXYCODONE HYDROCHLORIDE AND ACETAMINOPHEN 1 TABLET: 5; 325 TABLET ORAL at 02:14

## 2020-05-27 RX ADMIN — SODIUM CHLORIDE 75 ML/HR: 900 INJECTION, SOLUTION INTRAVENOUS at 13:26

## 2020-05-27 RX ADMIN — FAMOTIDINE 20 MG: 10 INJECTION, SOLUTION INTRAVENOUS at 08:43

## 2020-05-27 RX ADMIN — ALPRAZOLAM 0.25 MG: 0.25 TABLET ORAL at 08:43

## 2020-05-27 RX ADMIN — OXYCODONE HYDROCHLORIDE AND ACETAMINOPHEN 1 TABLET: 5; 325 TABLET ORAL at 08:43

## 2020-05-27 RX ADMIN — ACETAMINOPHEN 650 MG: 325 TABLET, FILM COATED ORAL at 06:52

## 2020-05-27 RX ADMIN — OXYCODONE HYDROCHLORIDE AND ACETAMINOPHEN 1 TABLET: 5; 325 TABLET ORAL at 12:40

## 2020-05-27 NOTE — DISCHARGE INSTRUCTIONS
1) regarding Traumatic Subarachnoid Hemorrhage with +loss of consciousness       -appears to have resolved on repeat head CT       does not need follow-up with NSGY       no ASA or blood thinners for at least 1 week    2) regarding R wrist fracture (distal radius)   Continue immobilization with splint and sling. No weight bearing with right hand/wrist  Written RX for pain meds given on DC  Follow up with Dr. Parker Junior- orthopedics- ASAP by calling for an appointment at [phone]  No driving until cleared by ortho and off all pain meds  Go to ER if you have sensory loss or cold fingers of the right hand  This injury will likely require surgical repair    3) for elevated creatinine and low normal blood pressure  Stop/hold your blood pressure medications until your blood pressure is > 120/80  Then resume olmesartan first, then HCTZ only if tolerated       Broken Wrist: Care Instructions  Your Care Instructions    Your wrist can break, or fracture, during sports, a fall, or other accidents. The break may happen when your wrist is hit or is used to protect you in a fall. Fractures can range from a small, hairline crack, to a bone or bones broken into two or more pieces. Your treatment depends on how bad the break is. Your fracture will require surgery to fix. This involves a plate and screws  Your doctor may have put your wrist in a  splint. This will help keep your wrist stable until your follow-up appointment. It may take weeks or months for your wrist to heal. You can help it heal with care at home. You heal best when you take good care of yourself. Eat a variety of healthy foods, and don't smoke. Follow-up care is a key part of your treatment and safety. Be sure to make and go to all appointments, and call your doctor if you are having problems. It's also a good idea to know your test results and keep a list of the medicines you take.   Call Dr. Isra Austin of [employer] at [phone] to schedule appointment  How can you care for yourself at home? · Put ice or a cold pack on your wrist for 10 to 20 minutes at a time. Try to do this every 1 to 2 hours for the next 3 days (when you are awake). Put a thin cloth between the ice and your cast or splint. Keep your cast or splint dry. · Follow the splint or cast care instructions your doctor gives you. If you have a splint, do not take it off unless your doctor tells you to. Be careful not to put the splint on too tight. · Be safe with medicines. Take pain medicines exactly as directed. ? If the doctor gave you a prescription medicine for pain, take it as prescribed. ? If you are not taking a prescription pain medicine, ask your doctor if you can take an over-the-counter medicine. · Prop up your wrist on pillows when you sit or lie down in the first few days after the injury. Keep your wrist higher than the level of your heart. This will help reduce swelling. · Move your fingers often to reduce swelling and stiffness, but do not use that hand to grab or carry anything. · Follow instructions for exercises to keep your arm strong. When should you call for help? Call your doctor now or seek immediate medical care if:    · You have new or worse pain.     · Your hand or fingers are cool or pale or change color.     · Your cast or splint feels too tight.     · You have tingling, weakness, or numbness in your hand or fingers.    Watch closely for changes in your health, and be sure to contact your doctor if:    · You do not get better as expected.     · You have problems with your cast or splint. Where can you learn more? Go to http://brandi-lise.info/  Enter J579 in the search box to learn more about \"Broken Wrist: Care Instructions. \"  Current as of: June 26, 2019Content Version: 12.4  © 8300-4041 [employer].   Care instructions adapted under license by [employer] (which disclaims liability or warranty for this information). If you have questions about a medical condition or this instruction, always ask your healthcare professional. [place] [address] any warranty or liability for your use of this information.

## 2020-05-27 NOTE — PROGRESS NOTES
Full Ortho consult to follow. 73 Y/O RHD female well known to me sustained a SAH and comminuted, intra-articular right distal radius fracture. She was splinted with a sugartong splint and is in a sling. She is NVI. Discussed case with patient and her , Rufus. She will need an ORIF of the wrist. She is to follow up with Dr. Fabio Garcia of [place] [address] for definitive care. She is cleared for discharge from an Orthopaedic perspective. DC instructions entered.

## 2020-05-27 NOTE — PROGRESS NOTES
I have reviewed discharge instructions with the patient. The patient verbalized understanding. Patient discharged to home with belongings. IV and telemetry removed.       Stroke Education documented in Patient Education: YES  Core Measures Documented in Connect [facility]:  Risk Factors: YES  Warning signs of stroke: YES  When to Activate 911: YES  Medication Education for Risk Factors: YES  Smoking cessation if applicable: YES  Written Education Given:  YES    Discharge NIH Completed: YES  Score: 0    BRAINS: YES    Follow Up Appointment Made: NO  Date/Time if applicable: Patient to call and make appointment

## 2020-05-27 NOTE — DISCHARGE SUMMARY
Discharge Summary       PATIENT ID: Rashad Alcantara  MRN: [id number]   YOB: 1955    DATE OF ADMISSION: 5/26/2020  9:00 AM    DATE OF DISCHARGE:5/27/20 3pm   Farhana Castro CARE PROVIDER: Virgilio Neal MD     ATTENDING PHYSICIAN:MARIA G  DISCHARGING PROVIDER: James Fierro MD    To contact this individual call [phone] and ask the  to page. If unavailable ask to be transferred the Adult Hospitalist Department. CONSULTATIONS: IP CONSULT TO NEUROSURGERY  IP CONSULT TO ORTHOPEDIC SURGERY    PROCEDURES/SURGERIES: * No surgery found *    [address] COURSE:    The patient is a 42-year-old female with a past medical history of allergic rhinitis, breast cancer, diverticulitis, hypertension and retinal tear of the left eye, who presents to the hospital with the above-mentioned symptom. History was primarily obtained from the patient. The patient reports that she was working outside on a ladder when she had a fall. The patient reports that she fell backwards. She approximately fell 2 rungs up, did strike her head and her back, unsure if she lost consciousness. The patient reports immediately she started noticing significant pain in her right wrist, also had some headache associated with neck pain and sacral pain. The patient reports that she got concerned and decided to come to the hospital.  In the ER, the patient had a CT scan done, which showed some trace subarachnoid hemorrhage associated with right radial fracture and was requested to be admitted under the hospitalist service. The patient denies any other complaints or problems.   Denies any blurry vision, trouble swallowing, trouble with speech, chest pain, shortness of breath, cough, fever, chills, abdominal pain, constipation, diarrhea, urinary symptoms, focal or generalized neurological weakness, recent travel, sick contacts, falls besides this one, injuries, hematemesis, melena, hemoptysis, hematuria or any other concerns or problems.     DISCHARGE DIAGNOSES / PLAN:      1. Bilateral subarachnoid hemorrhage:    Resolved, cleared for DC by neurosurgery with recs below  2.  mildly elevated creatinine- improved with holding diuretics and benicar and giving IVFluids, resume home meds slowly if tolerated  3. Hypertension:  low stable BP on DC  4.  right distal radius fracture- seen by ortho with recs as below  5. Hypercalcemia- resolved with IVFluids- prior PTH labs a few years ago were normal     ADDITIONAL CARE RECOMMENDATIONS:   1) regarding Traumatic Subarachnoid Hemorrhage with +loss of consciousness       -appears to have resolved on repeat head CT       does not need follow-up with NSGY       no ASA or blood thinners for at least 1 week    2) regarding R wrist fracture (distal radius)   Continue immobilization with splint and sling. No weight bearing with right hand/wrist  Written RX for pain meds given on DC  Follow up with Dr. Bailey Peña- orthopedics- ASA by calling for an appointment at [phone]  No driving until cleared by ortho and off all pain meds  Go to ER if you have sensory loss or cold fingers of the right hand  This injury will likely require surgical repair    3) for elevated creatinine and low normal blood pressure  Stop/hold your blood pressure medications until your blood pressure is > 120/80  Then resume olmesartan first, then HCTZ only if tolerated       Broken Wrist: Care Instructions  Your Care Instructions    Your wrist can break, or fracture, during sports, a fall, or other accidents. The break may happen when your wrist is hit or is used to protect you in a fall. Fractures can range from a small, hairline crack, to a bone or bones broken into two or more pieces. Your treatment depends on how bad the break is. Your fracture will require surgery to fix. This involves a plate and screws  Your doctor may have put your wrist in a  splint.  This will help keep your wrist stable until your follow-up appointment. It may take weeks or months for your wrist to heal. You can help it heal with care at home. You heal best when you take good care of yourself. Eat a variety of healthy foods, and don't smoke. Follow-up care is a key part of your treatment and safety. Be sure to make and go to all appointments, and call your doctor if you are having problems. It's also a good idea to know your test results and keep a list of the medicines you take. Call Dr. Jewell Iqbal of [place] [address] at [phone] to schedule appointment  How can you care for yourself at home? · Put ice or a cold pack on your wrist for 10 to 20 minutes at a time. Try to do this every 1 to 2 hours for the next 3 days (when you are awake). Put a thin cloth between the ice and your cast or splint. Keep your cast or splint dry. · Follow the splint or cast care instructions your doctor gives you. If you have a splint, do not take it off unless your doctor tells you to. Be careful not to put the splint on too tight. · Be safe with medicines. Take pain medicines exactly as directed. ? If the doctor gave you a prescription medicine for pain, take it as prescribed. ? If you are not taking a prescription pain medicine, ask your doctor if you can take an over-the-counter medicine. · Prop up your wrist on pillows when you sit or lie down in the first few days after the injury. Keep your wrist higher than the level of your heart. This will help reduce swelling. · Move your fingers often to reduce swelling and stiffness, but do not use that hand to grab or carry anything. · Follow instructions for exercises to keep your arm strong. When should you call for help?   Call your doctor now or seek immediate medical care if:    · You have new or worse pain.     · Your hand or fingers are cool or pale or change color.     · Your cast or splint feels too tight.     · You have tingling, weakness, or numbness in your hand or fingers.    Watch closely for changes in your health, and be sure to contact your doctor if:    · You do not get better as expected.     · You have problems with your cast or splint. PENDING TEST RESULTS:   At the time of discharge the following test results are still pending: none    FOLLOW UP APPOINTMENTS:    Follow-up Information     Follow up With Specialties Details Why Adriana Eason MD Orthopedic Surgery Schedule an appointment as soon as possible for a visit for follow up regarding right wrist fracture [address] [facility], [facility], [address] [phone]               DIET: heart healthy    ACTIVITY: Activity as tolerated and no driving     WOUND CARE: NA    EQUIPMENT needed: none      DISCHARGE MEDICATIONS:  Current Discharge Medication List      START taking these medications    Details   acetaminophen (TYLENOL) 325 mg tablet Take 2 Tabs by mouth every six (6) hours as needed for Pain. Qty: 30 Tab, Refills: 0      oxyCODONE-acetaminophen (PERCOCET) 5-325 mg per tablet Take 1 Tab by mouth every six (6) hours as needed (severe pain) for up to 14 days. Max Daily Amount: 4 Tabs. Qty: 30 Tab, Refills: 0    Associated Diagnoses: Closed fracture of right wrist, initial encounter         CONTINUE these medications which have NOT CHANGED    Details   olmesartan (BENICAR) 40 mg tablet Take 1 Tab by mouth daily. Qty: 90 Tab, Refills: 1      ALPRAZolam (XANAX) 0.25 mg tablet take 1 tablet by mouth once daily if needed for anxiety  Qty: 30 Tab, Refills: 0    Associated Diagnoses: Sleep disturbance; Anxiety      hydroCHLOROthiazide (HYDRODIURIL) 25 mg tablet TAKE 1 TABLET BY MOUTH EVERY DAY  Qty: 90 Tab, Refills: 1    Associated Diagnoses: Essential hypertension      biotin 2,500 mcg tab Take 2,500 mcg by mouth daily. triamcinolone (NASACORT AQ) 55 mcg nasal inhaler 2 Sprays by Both Nostrils route daily. cyanocobalamin (VITAMIN B-12) 1,000 mcg tablet Take 1,000 mcg by mouth daily. MULTIVITS-MIN/FA/CA CARB/VIT K (ONE-A-DAY WOMEN'S 50+ PO) Take 1 Tab by mouth daily. loratadine (CLARITIN) 10 mg tablet Take 10 mg by mouth daily. Associated Diagnoses: Eustachian tube dysfunction               NOTIFY YOUR PHYSICIAN FOR ANY OF THE FOLLOWING:   Fever over 101 degrees for 24 hours. Chest pain, shortness of breath, fever, chills, nausea, vomiting, diarrhea, change in mentation, falling, weakness, bleeding. Severe pain or pain not relieved by medications. Or, any other signs or symptoms that you may have questions about. DISPOSITION:    Home With:   OT  PT  HH  RN       Long term SNF/Inpatient Rehab   X Independent/assisted living    Hospice    Other:       PATIENT CONDITION AT DISCHARGE:     Functional status    Poor     Deconditioned    X Independent      Cognition   X  Lucid     Forgetful     Dementia      Catheters/lines (plus indication)    Salgado     PICC     PEG    X None      Code status     Full code     DNR      PHYSICAL EXAMINATION AT DISCHARGE:  Patient Vitals for the past 24 hrs:   Temp Pulse Resp BP SpO2   05/27/20 1404 97.7 °F (36.5 °C) 75 15 121/62 95 %   05/27/20 0957 98.1 °F (36.7 °C) 82 19 109/64 94 %   05/27/20 0800     95 %   05/27/20 0600 98.6 °F (37 °C) 70 16 120/48 95 %   05/27/20 0400  71      05/27/20 0200 99.2 °F (37.3 °C) 81 15 121/62 95 %   05/27/20 0000  68   95 %   05/26/20 2200 98.7 °F (37.1 °C) 66 12 105/51 94 %   05/26/20 1750 98 °F (36.7 °C) 82 10 123/60 100 %   05/26/20 1700    105/52 95 %   05/26/20 1600    122/64 99 %     General:          Alert, cooperative, no distress, appears stated age. HEENT:           Atraumatic, anicteric sclerae, pink conjunctivae                          No oral ulcers, mucosa moist, throat clear, dentition fair  Neck:               Supple, symmetrical  Lungs:             Clear to auscultation bilaterally.   No Wheezing or Rhonchi. No rales. Chest wall:      No tenderness  No Accessory muscle use. Heart:              Regular  rhythm,  No  murmur   No edema  Abdomen:        Soft, non-tender. Not distended. Bowel sounds normal  Extremities:     right arm in splint and sling- fingers ar right hand mildly swollen - warm, with appropriate sensation  Skin:                Not pale. Not Jaundiced  No rashes   Psych:             Not anxious or agitated.   Neurologic:      Alert, moves all extremities, answers questions appropriately and responds to commands   Recent Results (from the past 24 hour(s))   CBC W/O DIFF    Collection Time: 05/27/20  1:16 AM   Result Value Ref Range    WBC 10.8 3.6 - 11.0 K/uL    RBC 3.88 3.80 - 5.20 M/uL    HGB 11.2 (L) 11.5 - 16.0 g/dL    HCT 34.9 (L) 35.0 - 47.0 %    MCV 89.9 80.0 - 99.0 FL    MCH 28.9 26.0 - 34.0 PG    MCHC 32.1 30.0 - 36.5 g/dL    RDW 13.4 11.5 - 14.5 %    PLATELET 697 096 - 743 K/uL    MPV 9.7 8.9 - 12.9 FL    NRBC 0.0 0  WBC    ABSOLUTE NRBC 0.00 0.00 - 2.35 K/uL   METABOLIC PANEL, BASIC    Collection Time: 05/27/20  1:16 AM   Result Value Ref Range    Sodium 136 136 - 145 mmol/L    Potassium 4.0 3.5 - 5.1 mmol/L    Chloride 105 97 - 108 mmol/L    CO2 25 21 - 32 mmol/L    Anion gap 6 5 - 15 mmol/L    Glucose 104 (H) 65 - 100 mg/dL    BUN 26 (H) 6 - 20 MG/DL    Creatinine 1.10 (H) 0.55 - 1.02 MG/DL    BUN/Creatinine ratio 24 (H) 12 - 20      GFR est AA >60 >60 ml/min/1.73m2    GFR est non-AA 50 (L) >60 ml/min/1.73m2    Calcium 8.8 8.5 - 10.1 MG/DL   LIPID PANEL    Collection Time: 05/27/20  1:16 AM   Result Value Ref Range    LIPID PROFILE          Cholesterol, total 204 (H) <200 MG/DL    Triglyceride 60 <150 MG/DL    HDL Cholesterol 83 MG/DL    LDL, calculated 109 (H) 0 - 100 MG/DL    VLDL, calculated 12 MG/DL    CHOL/HDL Ratio 2.5 0.0 - 5.0     POC EG7    Collection Time: 05/27/20  1:25 AM   Result Value Ref Range    Calcium, ionized (POC) 1.27 1.12 - 1.32 mmol/L    pH (POC) 7.390 7.35 - 7.45      pCO2 (POC) 42.4 35.0 - 45.0 MMHG    pO2 (POC) 54 (L) 80 - 100 MMHG    HCO3 (POC) 25.7 22 - 26 MMOL/L    Base excess (POC) 1 mmol/L    sO2 (POC) 87 (L) 92 - 97 %    Site OTHER      Device: ROOM AIR      Allens test (POC) N/A      Specimen type (POC) VENOUS BLOOD           CHRONIC MEDICAL DIAGNOSES:  Problem List as of 5/27/2020 Date Reviewed: 5/27/2020          Codes Class Noted - Resolved    Traumatic subarachnoid bleed with LOC of 30 minutes or less ([facility] ICD-10-CM: S06. 6X1A  ICD-9-CM: 852.02  5/26/2020 - Present        Overweight ICD-10-CM: E66.3  ICD-9-CM: 278.02  4/25/2018 - Present        Malignant neoplasm of upper-inner quadrant of right female breast [facility]) ICD-10-CM: C50.211  ICD-9-CM: 174.2  10/16/2017 - Present        Prediabetes ICD-10-CM: R73.03  ICD-9-CM: 790.29  10/5/2016 - Present        Sleep disturbance ICD-10-CM: G47.9  ICD-9-CM: 780.50  1/29/2015 - Present        Breast neoplasm, Tis (LCIS) ICD-10-CM: D05.00  ICD-9-CM: 233.0  6/5/2014 - Present        DCIS (ductal carcinoma in situ) of breast ICD-10-CM: D05.10  ICD-9-CM: 233.0  6/5/2014 - Present        Use of tamoxifen (Nolvadex) ICD-10-CM: Z79.810  ICD-9-CM: V07.51  6/5/2014 - Present        S/P breast lumpectomy ICD-10-CM: E49.708  ICD-9-CM: V45.89  6/5/2013 - Present        GERD (gastroesophageal reflux disease) ICD-10-CM: K21.9  ICD-9-CM: 530.81  3/19/2013 - Present        Essential hypertension ICD-10-CM: I10  ICD-9-CM: 401.9  11/19/2010 - Present        Allergic rhinitis ICD-10-CM: J30.9  ICD-9-CM: 477.9  3/31/2010 - Present        Hyperlipidemia with target LDL less than 130 ICD-10-CM: E78.5  ICD-9-CM: 272.4  3/31/2010 - Present        Family history of osteoporosis ICD-10-CM: Z82.62  ICD-9-CM: V17.81  3/31/2010 - Present        RESOLVED: Obesity, Class I, BMI 30-34.9 ICD-10-CM: E66.9  ICD-9-CM: 278.00  10/16/2017 - 4/25/2018        RESOLVED: Obesity (BMI 30-39. 9) ICD-10-CM: E66.9  ICD-9-CM: 278.00  1/29/2015 - 10/16/2017        RESOLVED: [facility] ICD-10-CM: C50.919  ICD-9-CM: 174.9  7/10/2010 - 10/16/2017              Greater than 30 minutes were spent with the patient on counseling and coordination of care    Signed:   Arline Chung MD  5/27/2020  3:04 PM

## 2020-05-27 NOTE — ROUTINE PROCESS
OCCUPATIONAL THERAPY EVALUATION/DISCHARGE Patient: Irasema Edmond (74 y.o. female) Date: 5/27/2020 Primary Diagnosis: SAH (subarachnoid hemorrhage) ([facility] [place] [I60.9] Precautions: falls, (assume NWB RUE 2* wrist fx ASSESSMENT Based on the objective data described below, the patient presents with baseline ADL performance s/p admission for [facility] resulting from hitting head after she fell from ladder. Imaging positive for bilateral acute SDH, however, repeat CT showing almost complete resolution. Patient ADLs limited by pain in RUE and bulky dressing from R digits to R upper arm d/t R distal radius fx. Of note, no ortho plan for wrist fx at this time, however, assume NWB in RUE at this time. At baseline, patient lives in 2 level home with  and was IND, active, working part time and driving. Today, patient required min A to roll to R and sit on EOB - assist to right trunk. Patient with supervision for mobility in room. Patient supervision for toileting and IND to chandrakant socks sitting EOB in tailor sitting. Patient LUE ROM/strength/coordination and sensation intact. RUE ROM and strength limited 2* pain and wrist fx only. Placed RUE in sling for comfort. Patient vision and balance intact. Patient left sitting in chair with call bell in reach, RN aware and all needs met. No further acute OT needs, will sign off. Current Level of Function (ADLs/self-care): supervision-IND Functional Outcome Measure: The patient scored Total A-D Total A-D (Motor Function): 66/66 on the Fugl-Garner Assessment which is indicative of no impairment in upper extremity functional status. Other factors to consider for discharge: none - has good support at home PLAN : 
Recommendation for discharge: (in order for the patient to meet his/her long term goals) No skilled occupational therapy/ follow up rehabilitation needs identified at this time. This discharge recommendation: Has been made in collaboration with the attending provider and/or case management IF patient discharges home will need the following DME: none SUBJECTIVE:  
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  OBJECTIVE DATA SUMMARY:  
HISTORY:  
Past Medical History:  
Diagnosis Date  Allergic rhinitis  Arrhythmia  Breast cancer ([address] 2010  
 right breast  
 Cancer [facility]) 6/2010  
 right breast DCIS  
 Diverticulitis  Hypertension  Retinal tear of left eye 2/6/14 Past Surgical History:  
Procedure Laterality Date  HX BREAST BIOPSY Bilateral 1970 70's and 2010 benign (surgical)  HX BREAST BIOPSY Left 05/2010  
 benign mri bx  
 HX BREAST BIOPSY Right 03/2010  
 positive stereo  HX BREAST LUMPECTOMY  4/2010  
 right breast  
 HX OTHER SURGICAL Left 2/6/14 Retinal tear repair by Dr. Rony Woods  HX TONSIL AND ADENOIDECTOMY  HX TONSILLECTOMY Prior Level of Function/Environment/Context: lives in 2 level home with  and was IND with ADLs and mobility, no DME use. Expanded or extensive additional review of patient history:  
 
Home Situation Home Environment: Private residence # Steps to Enter: 4 Rails to Enter: No 
One/Two Story Residence: Two story Living Alone: No 
Support Systems: Spouse/Significant Other/Partner Patient Expects to be Discharged to[de-identified] Private residence Current DME Used/Available at Home: None Tub or Shower Type: Tub/Shower combination Hand dominance: Right EXAMINATION OF PERFORMANCE DEFICITS: 
Cognitive/Behavioral Status: 
Neurologic State: Alert Orientation Level: Oriented X4 Cognition: Appropriate for age attention/concentration; Follows commands Perception: Appears intact Perseveration: No perseveration noted Safety/Judgement: Awareness of environment; Fall prevention Skin: Appears grossly intact Edema: none noted in BUEs Hearing: Auditory Auditory Impairment: None Vision/Perceptual:   
Tracking: Able to track stimulus in all quadrants w/o difficulty Diplopia: No   
Acuity: Within Defined Limits Corrective Lenses: Glasses Range of Motion: In [address] AROM: Within functional limits(RUE limited 2* casting and pain d/t R wrist fx) PROM: Within functional limits(RUE limited 2* casting and pain d/t R wrist fx) Strength: In [address] Strength: Within functional limits(RUE limited 2* casting and pain d/t R wrist fx) Coordination: 
Coordination: Within functional limits(RUE limited 2* casting and pain d/t R wrist fx) Fine Motor Skills-Upper: Left Intact; Right Impaired(RUE limited 2* casting and pain d/t R wrist fx) Gross Motor Skills-Upper: Left Intact; Right Intact Tone & Sensation: In [address] Tone: Normal 
Sensation: Intact Balance: 
Sitting: Intact Standing: Intact Functional Mobility and Transfers for ADLs: 
Bed Mobility: 
Supine to Sit: Minimum assistance(rolling to R - assist to right trunk 2* RUE pain) Transfers: 
Sit to Stand: Supervision Stand to Sit: Supervision Toilet Transfer : Supervision ADL Assessment: 
Feeding: Independent Oral Facial Hygiene/Grooming: Independent Bathing: Supervision* Upper Body Dressing: Supervision* Lower Body Dressing: Supervision Toileting: Supervision *Infer per obs of functional reach, balance, strength, coordination and sensation ADL Intervention and task modifications: 
  
Grooming Position Performed: Standing Washing Hands: Supervision Lower Body Dressing Assistance Socks: Independent Leg Crossed Method Used: Yes Position Performed: Seated edge of bed Cues: Mattie Nano Toileting Bladder Hygiene: Independent Bowel Hygiene: Independent Clothing Management: Supervision Cognitive Retraining Safety/Judgement: Awareness of environment; Fall prevention Functional Measure: 
Fugl-Garner Assessment of Motor Recovery after Stroke:  
Reflex Activity Flexors/Biceps/Fingers: Can be elicited Extensors/Triceps: Can be elicited Reflex Subtotal: 4 Volitional Movement Within Synergies Shoulder Retraction: Full Shoulder Elevation: Full Shoulder Abduction (90 degrees): Full Shoulder External Rotation: Full Elbow Flexion: Full Forearm Supination: Full Shoulder Adduction/Internal Rotation: Full Elbow Extension: Full Forearm Pronation: Full Subtotal: 18 
 
Volitional Movement Mixing Synergies Hand to Lumbar Spine: Full Shoulder Flexion (0-90 degrees): Full Pronation-Supination: Full Subtotal: 6 Volitional Movement With Little or No Synergy Shoulder Abduction (0-90 degrees): Full Shoulder Flexion ( degrees): Full Pronation/Supination: Full Subtotal : 6 Normal Reflex Activity Biceps, Triceps, Finger Flexors: Full Subtotal : 2 Upper Extremity Total  
Upper Extremity Total: 36 Wrist 
Stability at 15 Degree Dorsiflexion: Full Repeated Dorsiflexion/ Volar Flexion: Full Stability at 15 Degree Dorsiflexion: Full Repeated Dorsiflexion/ Volar Flexion: Full Circumduction: Full Wrist Total: 10 Hand Mass Flexion: Full Mass Extension: Full Grasp A: Full Grasp B: Full Grasp C: Full Grasp D: Full Grasp E: Full Hand Total: 14 
 
Coordination/Speed Tremor: None Dysmetria: None Time: <1s Coordination/Speed Total : 6 Total A-D Total A-D (Motor Function): 02/50 This is a reliable/valid measure of arm function after a neurological event. It has established value to characterize functional status and for measuring spontaneous and therapy-induced recovery; tests proximal and distal motor functions. Fugl-Garner Assessment  UE scores recorded between five and 30 days post neurologic event can be used to predict UE recovery at six months post neurologic event. Severe = 0-21 points Moderately Severe = 22-33 points Moderate = 34-47 points Mild = 48-66 points EVELIA Pendleton, VIRGEN Gaines, & RUFUS Castellanos (Matilde). Measurement of motor recovery after stroke: Outcome assessment and sample size requirements. Stroke, 23, pp. 1341-2019.  
------------------------------------------------------------------------------------------------------------------------------------------------------------------ MCID: 
Stroke: Marcelo Rainey et al, 2001; n = 171; mean age 79 (6) years; assessed within 16 (12) days of stroke, Acute Stroke) FMA Motor Scores from Admission to Discharge 10 point increase in FMA Upper Extremity = 1.5 change in discharge FIM  
10 point increase in FMA Lower Extremity = 1.9 change in discharge FIM 
MDC:  
Stroke:  
Demetris Kaur et al, 2008, n = 14, mean age = 59.9 (14.6) years, assessed on average 14 (6.5) months post stroke, Chronic Stroke) FMA = 5.2 points for the Upper Extremity portion of the assessment Occupational Therapy Evaluation Charge Determination History Examination Decision-Making LOW Complexity : Brief history review  LOW Complexity : 1-3 performance deficits relating to physical, cognitive , or psychosocial skils that result in activity limitations and / or participation restrictions  LOW Complexity : No comorbidities that affect functional and no verbal or physical assistance needed to complete eval tasks Based on the above components, the patient evaluation is determined to be of the following complexity level: LOW Pain Rating: 
Reporting mild pain in RUE, did not quantify Activity Tolerance:  
Good Please refer to the flowsheet for vital signs taken during this treatment. After treatment patient left in no apparent distress:   
Sitting in chair, Call bell within reach and RN aware COMMUNICATION/EDUCATION:  
The patients plan of care was discussed with: Physical therapist, Registered nurse and Case management. Thank you for this referral. 
Taylor Calhoun OT Time Calculation: 24 mins

## 2020-05-27 NOTE — PROGRESS NOTES
Problem: Patient Education: Go to Patient Education Activity  Goal: Patient/Family Education  Outcome: Progressing Towards Goal     Problem: Subarachnoid Hemorrhage Stroke:Admission Day 2  Goal: Activity/Safety  Outcome: Progressing Towards Goal  Goal: Diagnostic Test/Procedures  Outcome: Progressing Towards Goal  Goal: Nutrition/Diet  Outcome: Progressing Towards Goal  Goal: Medications  Outcome: Progressing Towards Goal  Goal: Patient maintains clear airway/absence of aspiration  Outcome: Progressing Towards Goal  Goal: Treatments/Interventions/Procedures  Outcome: Progressing Towards Goal  Goal: Psychosocial  Outcome: Progressing Towards Goal  Goal: *Hemodynamically stable  Outcome: Progressing Towards Goal     Problem: Pain - Acute  Goal: *Control of acute pain  Outcome: Progressing Towards Goal

## 2020-05-27 NOTE — ROUTINE PROCESS
Orders received, chart reviewed and patient evaluated by occupational therapy. Pending progression with skilled acute occupational therapy, recommend: No skilled occupational therapy/ follow up rehabilitation needs identified at this time. Recommend with nursing patient to complete as able in order to maintain strength, endurance and independence: OOB to chair 3x/day with supervision and functional mobility to the bathroom with supervision. Thank you for your assistance. Full evaluation to follow.

## 2020-05-27 NOTE — PROGRESS NOTES
Problem: Subarachnoid Hemorrhage Stroke:Admission Day (0-24 HRS)  Goal: Activity/Safety  Outcome: Progressing Towards Goal  Goal: Consults, if ordered  Outcome: Progressing Towards Goal  Goal: Diagnostic Test/Procedures  Outcome: Progressing Towards Goal  Goal: Nutrition/Diet  Outcome: Progressing Towards Goal  Goal: Medications  Outcome: Progressing Towards Goal  Goal: Patient maintains clear airway/absence of aspiration  Outcome: Progressing Towards Goal  Goal: Treatments/Interventions/Procedures  Outcome: Progressing Towards Goal  Goal: Psychosocial  Outcome: Progressing Towards Goal  Goal: *Hemodynamically stable  Outcome: Progressing Towards Goal  Goal: *Progressive mobility and function  Description: i.e. ADLs  Outcome: Progressing Towards Goal     Problem: Pain - Acute  Goal: *Control of acute pain  Outcome: Progressing Towards Goal     Problem: Patient Education: Go to Patient Education Activity  Goal: Patient/Family Education  Outcome: Progressing Towards Goal

## 2020-05-27 NOTE — CONSULTS
FRACTURE CONSULT NOTE    Subjective:     Date of Consultation:  May 27, 2020      Samantha Araya is a 72 y.o. female who is being seen for right wrist fracture. Injury occurred yesterday at home while Pt. was she was up on a ladder trying to trim some hedges when she lost her balance and fell backwards. She estimates that she was up at least 6 feet when she fell. She backpedaled to keep from falling down but eventually struck a tree with her head and landed on her outstretched right arm. She admits to [facility]. She was able to crawl into her house to call her  and EMS who brought her to [facility] for evaluation and treatment. Workup has revealed a SAH and a displaced, comminuted and intraarticular distal radius fracture. She was placed in a splint in the ED. She was admitted last night for observation of her SAH. Follow up CT this morning reveals resolution of SAH. Pt. Is right hand dominant. She denies any other injury.     Patient Active Problem List    Diagnosis Date Noted    Traumatic subarachnoid bleed with LOC of 30 minutes or less ([facility] 05/26/2020    Overweight 04/25/2018    Malignant neoplasm of upper-inner quadrant of right female breast ([facility] 10/16/2017    Prediabetes 10/05/2016    Sleep disturbance 01/29/2015    Breast neoplasm, Tis (LCIS) 06/05/2014    DCIS (ductal carcinoma in situ) of breast 06/05/2014    Use of tamoxifen (Nolvadex) 06/05/2014    S/P breast lumpectomy 06/05/2013    GERD (gastroesophageal reflux disease) 03/19/2013    Essential hypertension 11/19/2010    Allergic rhinitis 03/31/2010    Hyperlipidemia with target LDL less than 130 03/31/2010    Family history of osteoporosis 03/31/2010     Family History   Problem Relation Age of Onset    Other Mother         overweight, gout    Heart Disease Mother         chf    Hypertension Mother     Glaucoma Father     Diabetes Father     Hypertension Father     Osteoporosis Paternal Grandmother     Diabetes Paternal Aunt 44 breast cancer      Social History     Tobacco Use    Smoking status: Never Smoker    Smokeless tobacco: Never Used   Substance Use Topics    Alcohol use: No     Past Medical History:   Diagnosis Date    Allergic rhinitis     Arrhythmia     Breast cancer ([facility] 2010    right breast    Cancer ([facility] 6/2010    right breast DCIS    Diverticulitis     Hypertension     Retinal tear of left eye 2/6/14      Past Surgical History:   Procedure Laterality Date    HX BREAST BIOPSY Bilateral 1970 70's and 2010 benign (surgical)    HX BREAST BIOPSY Left 05/2010    benign mri bx    HX BREAST BIOPSY Right 03/2010    positive stereo    HX BREAST LUMPECTOMY  4/2010    right breast    HX OTHER SURGICAL Left 2/6/14     Retinal tear repair by Dr. Sylvain Victor        Prior to Admission medications    Medication Sig Start Date End Date Taking? Authorizing Provider   olmesartan (BENICAR) 40 mg tablet Take 1 Tab by mouth daily. 4/16/20  Yes Blanca Fisher MD   ALPRAZolam (XANAX) 0.25 mg tablet take 1 tablet by mouth once daily if needed for anxiety 4/1/20  Yes Blanca Fisher MD   hydroCHLOROthiazide (HYDRODIURIL) 25 mg tablet TAKE 1 TABLET BY MOUTH EVERY DAY 3/12/20  Yes Blanca Fisher MD   biotin 2,500 mcg tab Take 2,500 mcg by mouth daily. 7/22/15  Yes Carrington Soliman MD   triamcinolone (NASACORT AQ) 55 mcg nasal inhaler 2 Sprays by Both Nostrils route daily. Yes Provider, Historical   cyanocobalamin (VITAMIN B-12) 1,000 mcg tablet Take 1,000 mcg by mouth daily. Yes Provider, Historical   MULTIVITS-MIN/FA/CA CARB/VIT K (ONE-A-DAY WOMEN'S 50+ PO) Take 1 Tab by mouth daily. Yes Provider, Historical   loratadine (CLARITIN) 10 mg tablet Take 10 mg by mouth daily.    Yes Provider, Historical     Current Facility-Administered Medications   Medication Dose Route Frequency    ALPRAZolam (XANAX) tablet 0.25 mg  0.25 mg Oral BID    ondansetron (ZOFRAN) injection 4 mg  4 mg IntraVENous Q6H PRN    naloxone (NARCAN) injection 0.4 mg  0.4 mg IntraVENous PRN    acetaminophen (TYLENOL) tablet 650 mg  650 mg Oral Q4H PRN    Or    acetaminophen (TYLENOL) solution 650 mg  650 mg Per NG tube Q4H PRN    Or    acetaminophen (TYLENOL) suppository 650 mg  650 mg Rectal Q4H PRN    0.9% sodium chloride infusion  75 mL/hr IntraVENous CONTINUOUS    acetaminophen (TYLENOL) tablet 650 mg  650 mg Oral Q6H PRN    famotidine (PF) (PEPCID) 20 mg in 0.9% sodium chloride 10 mL injection  20 mg IntraVENous Q12H    hydrALAZINE (APRESOLINE) 20 mg/mL injection 10 mg  10 mg IntraVENous Q6H PRN    oxyCODONE-acetaminophen (PERCOCET) 5-325 mg per tablet 1 Tab  1 Tab Oral Q4H PRN      No Known Allergies     Review of Systems:  A comprehensive review of systems was negative except for that written in the HPI. Mental Status: A&O x 3    Objective:     Patient Vitals for the past 8 hrs:   BP Temp Pulse Resp SpO2   20 0957 109/64 98.1 °F (36.7 °C) 82 19 94 %   20 0800     95 %   20 0600 120/48 98.6 °F (37 °C) 70 16 95 %     Temp (24hrs), Av.5 °F (36.9 °C), Min:98 °F (36.7 °C), Max:99.2 °F (37.3 °C)      EXAM: alert, fatigued, cooperative, no distress, appears stated age,   Neuro: grossly non-focal.  Lung:  clear to auscultation bilaterally. Cor:  regular rate and rhythm, S1, S2 normal, no murmur, click, rub or gallop. Abdomen:  soft, non-tender. Bowel sounds normal. No masses,  no organomegaly. Skin:  no rash or abnormalities. Extremities:  RUE in a well padded sugartong splint. Is able to move fingers and is NVI   Capillary refill <2 secs in right hand, Sensation intact in right upper extremity. Compartments soft    Imaging Review: FINDINGS: Three  views of the right wrist demonstrate comminuted mildly impacted  fracture of the distal right radius. There are changes of chondrocalcinosis. . .     IMPRESSION  IMPRESSION: Comminuted mildly impacted fracture distal right nathaly. .    Labs:   Recent Results (from the past 24 hour(s))   CBC W/O DIFF    Collection Time: 05/27/20  1:16 AM   Result Value Ref Range    WBC 10.8 3.6 - 11.0 K/uL    RBC 3.88 3.80 - 5.20 M/uL    HGB 11.2 (L) 11.5 - 16.0 g/dL    HCT 34.9 (L) 35.0 - 47.0 %    MCV 89.9 80.0 - 99.0 FL    MCH 28.9 26.0 - 34.0 PG    MCHC 32.1 30.0 - 36.5 g/dL    RDW 13.4 11.5 - 14.5 %    PLATELET 019 172 - 129 K/uL    MPV 9.7 8.9 - 12.9 FL    NRBC 0.0 0  WBC    ABSOLUTE NRBC 0.00 0.00 - 2.18 K/uL   METABOLIC PANEL, BASIC    Collection Time: 05/27/20  1:16 AM   Result Value Ref Range    Sodium 136 136 - 145 mmol/L    Potassium 4.0 3.5 - 5.1 mmol/L    Chloride 105 97 - 108 mmol/L    CO2 25 21 - 32 mmol/L    Anion gap 6 5 - 15 mmol/L    Glucose 104 (H) 65 - 100 mg/dL    BUN 26 (H) 6 - 20 MG/DL    Creatinine 1.10 (H) 0.55 - 1.02 MG/DL    BUN/Creatinine ratio 24 (H) 12 - 20      GFR est AA >60 >60 ml/min/1.73m2    GFR est non-AA 50 (L) >60 ml/min/1.73m2    Calcium 8.8 8.5 - 10.1 MG/DL   LIPID PANEL    Collection Time: 05/27/20  1:16 AM   Result Value Ref Range    LIPID PROFILE          Cholesterol, total 204 (H) <200 MG/DL    Triglyceride 60 <150 MG/DL    HDL Cholesterol 83 MG/DL    LDL, calculated 109 (H) 0 - 100 MG/DL    VLDL, calculated 12 MG/DL    CHOL/HDL Ratio 2.5 0.0 - 5.0     POC EG7    Collection Time: 05/27/20  1:25 AM   Result Value Ref Range    Calcium, ionized (POC) 1.27 1.12 - 1.32 mmol/L    pH (POC) 7.390 7.35 - 7.45      pCO2 (POC) 42.4 35.0 - 45.0 MMHG    pO2 (POC) 54 (L) 80 - 100 MMHG    HCO3 (POC) 25.7 22 - 26 MMOL/L    Base excess (POC) 1 mmol/L    sO2 (POC) 87 (L) 92 - 97 %    Site OTHER      Device: ROOM AIR      Allens test (POC) N/A      Specimen type (POC) VENOUS BLOOD           Impression:     Patient Active Problem List    Diagnosis Date Noted    Tra[facility] or [facility] 05/26/2020    Overweight 04/25/2018    Malignant neoplasm of upper-inner quadrant of right female breast ([facility] 10/16/2017    Prediabetes 10/05/2016    Sleep disturbance 01/29/2015    Breast neoplasm, Tis (LCIS) 06/05/2014    DCIS (ductal carcinoma in situ) of breast 06/05/2014    Use of tamoxifen (Nolvadex) 06/05/2014    S/P breast lumpectomy 06/05/2013    GERD (gastroesophageal reflux disease) 03/19/2013    Essential hypertension 11/19/2010    Allergic rhinitis 03/31/2010    Hyperlipidemia with target LDL less than 130 03/31/2010    Family history of osteoporosis 03/31/2010     Active Problems:    Traumatic subarachnoid bleed with LOC of 30 minutes or less [facility] (5/26/2020)        Plan:   Pt.stable. She will require ORIF  Pt. immobilized with splint and sling. Non weight bearing with right arm. Pain meds per primary team.  Pt. to be discharged to home today. F/u with Dr. Ej Katz ASAP by calling for an appointment at [phone]  Dr. Ej Katz aware and agrees with above plan.        RAHUL Jain

## 2020-05-27 NOTE — PROGRESS NOTES
Physical Therapy Note:  Consult received and chart reviewed. Patient completed OT consult with no needs identified for either discipline. Spoke with the patient who confirmed and deferred evaluation. Per patient, she is preparing for discharge home.  Will sign-off

## 2020-05-27 NOTE — PROGRESS NOTES
Neurosurgery Progress Note  Gilson Mccabe ACNP-BC  [phone]        Admit Date: 2020   LOS: 1 day        Daily Progress Note: 2020      Subjective: The patient was on a ladder yesterday trimming bushes when she lost her balance and fell, landing on her right wrist on the ground. She did lose consciousness and when she woke up she called her . When he came home, she seemed dazed and confused and had a deformed right wrist. She came to the [facility] for evaluation. Her head CT revealed bilateral trace SAH. She also had a right wrist fracture. She was admitted overnight for observation. This morning her head CT shows that her trace SAH has resolved. Her main complaint this morning is wrist pain from her splinted arm. Denies numbness, tingling, chest pain, leg pain, nausea, vomiting, difficulty swallowing, headache, and dyspnea. Objective:     Vital signs  Temp (24hrs), Av.5 °F (36.9 °C), Min:98 °F (36.7 °C), Max:99.2 °F (37.3 °C)   No intake/output data recorded. No intake/output data recorded. Visit Vitals  /64 (BP 1 Location: Left arm, BP Patient Position: Sitting)   Pulse 82   Temp 98.1 °F (36.7 °C)   Resp 19   Wt 92.8 kg (204 lb 9.6 oz)   SpO2 94%   Breastfeeding No   BMI 33.02 kg/m²      O2 Device: Room air     Pain control  Pain Assessment  Pain Scale 1: Numeric (0 - 10)  Pain Intensity 1: 0    PT/OT  Gait                 Physical Exam:  Gen:NAD. Neuro: A&Ox3. Follows commands. Speech clear. Affect normal.  PERRL. EOMI. Face symmetric. Tongue midline. SEGUNDO spontaneously. Strength 5/5 in LUE and BLE. Good ROM in RUE, but right wrist is splinted. Gait deferred. Skin: Good color in right fingers and thumb and good capillary refill. CT head without contrast on 2020 shows bilateral trace acute subarachnoid hemorrhage. CT head without contrast on 2020 shows interval near complete resolution of bilateral trace subarachnoid hemorrhage.  No new hemorrhage identified. 24 hour results:    Recent Results (from the past 24 hour(s))   CBC W/O DIFF    Collection Time: 05/27/20  1:16 AM   Result Value Ref Range    WBC 10.8 3.6 - 11.0 K/uL    RBC 3.88 3.80 - 5.20 M/uL    HGB 11.2 (L) 11.5 - 16.0 g/dL    HCT 34.9 (L) 35.0 - 47.0 %    MCV 89.9 80.0 - 99.0 FL    MCH 28.9 26.0 - 34.0 PG    MCHC 32.1 30.0 - 36.5 g/dL    RDW 13.4 11.5 - 14.5 %    PLATELET 979 724 - 764 K/uL    MPV 9.7 8.9 - 12.9 FL    NRBC 0.0 0  WBC    ABSOLUTE NRBC 0.00 0.00 - 1.16 K/uL   METABOLIC PANEL, BASIC    Collection Time: 05/27/20  1:16 AM   Result Value Ref Range    Sodium 136 136 - 145 mmol/L    Potassium 4.0 3.5 - 5.1 mmol/L    Chloride 105 97 - 108 mmol/L    CO2 25 21 - 32 mmol/L    Anion gap 6 5 - 15 mmol/L    Glucose 104 (H) 65 - 100 mg/dL    BUN 26 (H) 6 - 20 MG/DL    Creatinine 1.10 (H) 0.55 - 1.02 MG/DL    BUN/Creatinine ratio 24 (H) 12 - 20      GFR est AA >60 >60 ml/min/1.73m2    GFR est non-AA 50 (L) >60 ml/min/1.73m2    Calcium 8.8 8.5 - 10.1 MG/DL   LIPID PANEL    Collection Time: 05/27/20  1:16 AM   Result Value Ref Range    LIPID PROFILE          Cholesterol, total 204 (H) <200 MG/DL    Triglyceride 60 <150 MG/DL    HDL Cholesterol 83 MG/DL    LDL, calculated 109 (H) 0 - 100 MG/DL    VLDL, calculated 12 MG/DL    CHOL/HDL Ratio 2.5 0.0 - 5.0     POC EG7    Collection Time: 05/27/20  1:25 AM   Result Value Ref Range    Calcium, ionized (POC) 1.27 1.12 - 1.32 mmol/L    pH (POC) 7.390 7.35 - 7.45      pCO2 (POC) 42.4 35.0 - 45.0 MMHG    pO2 (POC) 54 (L) 80 - 100 MMHG    HCO3 (POC) 25.7 22 - 26 MMOL/L    Base excess (POC) 1 mmol/L    sO2 (POC) 87 (L) 92 - 97 %    Site OTHER      Device: ROOM AIR      Allens test (POC) N/A      Specimen type (POC) VENOUS BLOOD            Assessment:     Active Problems:    Traumatic subarachnoid bleed with LOC of 30 minutes or less ([facility] (5/26/2020)        Plan:   1.  Traumatic SAH with +LOC for less than 30 mins   - SAH resolved on head CT   - ok to d/c from [address] [place] [address] standpoint   - does not need follow-up with NSGY   - hold ASA for 1 week  2. Right comminuted wrist fracture   - ortho follow-up outpatient    Activity: up with assist  DVT ppx: SCDs  Dispo: home    Plan d/w Dr. Erika Mercado, Dr. Jennifer Beltran. I called and spoke to the patient's  on the telephone and discussed head CT results.       Anca Murrell, NP

## 2020-05-27 NOTE — PROGRESS NOTES
Bedside and Verbal shift change report given to [id number] (oncoming nurse) by Moisés Vasquez (offgoing nurse). Report included the following information SBAR, Kardex and Recent Results.

## 2020-05-28 ENCOUNTER — PATIENT OUTREACH (OUTPATIENT)
Dept: CASE MANAGEMENT | Age: 65
End: 2020-05-28

## 2020-05-28 NOTE — PROGRESS NOTES
2020 Care transitions nurse-   Pt was resting -reached her  who works for [employer] - as a manager with surgical instrumentation processing-   Discharge - AVS reviewed with Ms. Blade Cho -   Ms. Blade Cho fell from a ladder - cutting a hedge - she is sore now - she has the follow up orthopedic appt for pending right radius fracture -   Her bp was low with combination of pain meds in hospital - they are aware to monitor and parameters for resumption of meds -     Pt has trace SAH - resolving - per notes - neurosurgeon was consulted -     Patient contacted regarding COVID-19  risk. Discussed COVID-19 related testing which was pending at this time. Test results were not done. Patient informed of results, if available? n/a     Care Transition Nurse/ Ambulatory Care Manager contacted the family by telephone to perform post discharge assessment. Verified name and  with family as identifiers. Provided introduction to self, and explanation of the CTN/ACM role, and reason for call due to risk factors for infection and/or exposure to COVID-19. Symptoms reviewed with family who verbalized the following symptoms:none/ visit for fall/ wrist fx and SAH. Due to no new or worsening symptoms encounter was not routed to provider for escalation. Patient has following risk factors of: hypertension    CTN/ACM reviewed discharge instructions, medical action plan and red flags such as increased shortness of breath, increasing fever and signs of decompensation with family who verbalized understanding. Discussed exposure protocols and quarantine with CDC Guidelines What to do if you are sick with coronavirus disease .  Family was given an opportunity for questions and concerns. The family agrees to contact the [facility] exposure line [phone], [facility] department [address]  ([phone]) and PCP office for questions related to their healthcare.  CTN/ACM provided contact information for future needs. Reviewed and educated family on any new and changed medications related to discharge diagnosis. Patient/family/caregiver given information for [employer] and agrees to enroll no/declined  is nurse  Patient's preferred e-mail:  Declined  Patient's preferred phone number: [phone]  Based on Loop alert triggers, patient will be contacted by nurse care manager for worsening symptoms. Follow up call 14 days - post surgical evaluation and follow up- based on severity of symptoms and risk factors.  is  RN and said they are well versed in Covid care and monitoring. Sae Cano, RN, [facility], CCM  Care transitions nurse [phone]  [facility] Coordination Team  __________________________________________________________________________________   DISCHARGE DIAGNOSES / PLAN:       1.  Bilateral subarachnoid hemorrhage:    Resolved, cleared for DC by neurosurgery with recs below  2.  mildly elevated creatinine- improved with holding diuretics and benicar and giving IVFluids, resume home meds slowly if tolerated  3.  Hypertension:  low stable BP on DC  4.  right distal radius fracture- seen by ortho with recs as below  5. Hypercalcemia- resolved with IVFluids- prior PTH labs a few years ago were normal      ADDITIONAL CARE RECOMMENDATIONS:   1) regarding Traumatic Subarachnoid Hemorrhage with +loss of consciousness       -appears to have resolved on repeat head CT       does not need follow-up with NSGY       no ASA or blood thinners for at least 1 week     2) regarding R wrist fracture (distal radius)   Continue immobilization with splint and sling.   No weight bearing with right hand/wrist  Written RX for pain meds given on DC  Follow up with Dr. Virgen- orthopedics- ASAP by calling for an appointment at [phone]  No driving until cleared by ortho and off all pain meds  Go to ER if you have sensory loss or cold fingers of the right hand  This injury will likely require surgical repair     3) for elevated creatinine and low normal blood pressure  Stop/hold your blood pressure medications until your blood pressure is > 120/80  Then resume olmesartan first, then HCTZ only if tolerated       Broken Wrist: Care Instructions  Your Care Instructions

## 2020-07-02 DIAGNOSIS — I10 ESSENTIAL HYPERTENSION: ICD-10-CM

## 2020-07-02 NOTE — TELEPHONE ENCOUNTER
.Pharmacy is requesting a refill for 90 day supply for the medication to his new pharmacy  . Requested Prescriptions     Pending Prescriptions Disp Refills    hydroCHLOROthiazide (HYDRODIURIL) 25 mg tablet 90 Tab 1     Sig: TAKE 1 TABLET BY MOUTH EVERY DAY    olmesartan (BENICAR) 40 mg tablet 90 Tab 1     Sig: Take 1 Tab by mouth daily.      LOV : April 22, 2020 03:00 PM  Pharmacy verified

## 2020-07-02 NOTE — TELEPHONE ENCOUNTER
PCP: Amanda Cheng MD    Last appt: 4/22/2020  Future Appointments   Date Time Provider Robel Cardona   7/7/2020 10:00 AM [facility] JENNY 2 [facility]   7/20/2020 11:00 AM [facility] DEXA 1 [facility]       Requested Prescriptions     Pending Prescriptions Disp Refills    hydroCHLOROthiazide (HYDRODIURIL) 25 mg tablet 90 Tab 1     Sig: TAKE 1 TABLET BY MOUTH EVERY DAY    olmesartan (BENICAR) 40 mg tablet 90 Tab 1     Sig: Take 1 Tab by mouth daily.

## 2020-07-04 RX ORDER — HYDROCHLOROTHIAZIDE 25 MG/1
TABLET ORAL
Qty: 90 TAB | Refills: 1 | Status: SHIPPED | OUTPATIENT
Start: 2020-07-04 | End: 2020-09-05

## 2020-07-04 RX ORDER — OLMESARTAN MEDOXOMIL 40 MG/1
40 TABLET ORAL DAILY
Qty: 90 TAB | Refills: 1 | Status: SHIPPED | OUTPATIENT
Start: 2020-07-04 | End: 2021-01-12

## 2020-07-07 ENCOUNTER — HOSPITAL ENCOUNTER (OUTPATIENT)
Dept: MAMMOGRAPHY | Age: 65
Discharge: HOME | End: 2020-07-07
Attending: FAMILY MEDICINE
Payer: COMMERCIAL

## 2020-07-07 DIAGNOSIS — Z12.31 VISIT FOR SCREENING MAMMOGRAM: ICD-10-CM

## 2020-07-07 PROCEDURE — 77063 BREAST TOMOSYNTHESIS BI: CPT

## 2020-07-08 NOTE — PROGRESS NOTES
Place order for diagnostic mammo    FINDINGS:      There is a focal asymmetry in the upper, outer quadrant of the left breast, deep  3rd. There is a newly developed segmental distribution of calcifications in the  upper, outer quadrant of the left breast.   There is no significant mammographic finding in the contralateral breast.  Additional imaging evaluation needed, with spot compression views and possible  ultrasound. .  IMPRESSION  IMPRESSION:    Incomplete; needs additional imaging evaluation. BI-RADS category 0.

## 2020-07-10 ENCOUNTER — HOSPITAL ENCOUNTER (OUTPATIENT)
Dept: MAMMOGRAPHY | Age: 65
Discharge: HOME | End: 2020-07-10
Attending: FAMILY MEDICINE
Payer: COMMERCIAL

## 2020-07-10 DIAGNOSIS — Z78.0 POST-MENOPAUSAL: ICD-10-CM

## 2020-07-10 DIAGNOSIS — R92.8 ABNORMAL MAMMOGRAM: ICD-10-CM

## 2020-07-10 DIAGNOSIS — Z13.820 OSTEOPOROSIS SCREENING: ICD-10-CM

## 2020-07-10 PROCEDURE — 76642 ULTRASOUND BREAST LIMITED: CPT

## 2020-07-10 PROCEDURE — 77080 DXA BONE DENSITY AXIAL: CPT

## 2020-07-10 PROCEDURE — 77065 DX MAMMO INCL CAD UNI: CPT

## 2020-07-10 NOTE — PROGRESS NOTES
I called office and was on hold for over 10 minutes. Unable to leave a message re:  Biopsy recommendation.

## 2020-07-12 NOTE — PROGRESS NOTES
Please take calcium 500 mg daily and vitamin D 2000 international units  daily      IMPRESSION      This patient is osteopenic using the World Health Organization criteria  As compared to the prior study, there has been an increase in the bone mineral  density of the lumbar spine of 4.5%, and a decrease in the bone mineral density  of the left total hip of 6.6% and of the right total hip of 5.9%.   10 year probability of major osteoporotic fracture: 8.4%  10 year probability of hip fracture: 0.8%

## 2020-07-17 ENCOUNTER — HOSPITAL ENCOUNTER (OUTPATIENT)
Dept: MAMMOGRAPHY | Age: 65
Discharge: HOME | End: 2020-07-17
Attending: FAMILY MEDICINE
Payer: COMMERCIAL

## 2020-07-17 ENCOUNTER — APPOINTMENT (OUTPATIENT)
Dept: MAMMOGRAPHY | Age: 65
End: 2020-07-17
Payer: COMMERCIAL

## 2020-07-17 DIAGNOSIS — R92.8 ABNORMAL MAMMOGRAM: ICD-10-CM

## 2020-07-17 DIAGNOSIS — N64.59 OTHER SIGNS AND SYMPTOMS IN BREAST: ICD-10-CM

## 2020-07-17 PROCEDURE — 88305 TISSUE EXAM BY PATHOLOGIST: CPT

## 2020-07-17 PROCEDURE — 19081 BX BREAST 1ST LESION STRTCTC: CPT

## 2020-07-17 PROCEDURE — 77065 DX MAMMO INCL CAD UNI: CPT

## 2020-07-17 PROCEDURE — 88341 IMHCHEM/IMCYTCHM EA ADD ANTB: CPT

## 2020-07-17 PROCEDURE — 88360 TUMOR IMMUNOHISTOCHEM/MANUAL: CPT

## 2020-07-17 PROCEDURE — 88342 IMHCHEM/IMCYTCHM 1ST ANTB: CPT

## 2020-07-17 PROCEDURE — 74011000250 HC RX REV CODE- 250: Performed by: RADIOLOGY

## 2020-07-17 PROCEDURE — 19082 BX BREAST ADD LESION STRTCTC: CPT

## 2020-07-17 RX ORDER — LIDOCAINE HYDROCHLORIDE 10 MG/ML
15 INJECTION INFILTRATION; PERINEURAL
Status: COMPLETED | OUTPATIENT
Start: 2020-07-17 | End: 2020-07-17

## 2020-07-17 RX ORDER — LIDOCAINE HYDROCHLORIDE AND EPINEPHRINE 10; 10 MG/ML; UG/ML
10 INJECTION, SOLUTION INFILTRATION; PERINEURAL ONCE
Status: COMPLETED | OUTPATIENT
Start: 2020-07-17 | End: 2020-07-17

## 2020-07-17 RX ADMIN — LIDOCAINE HYDROCHLORIDE 15 ML: 10 INJECTION, SOLUTION INFILTRATION; PERINEURAL at 10:10

## 2020-07-17 RX ADMIN — LIDOCAINE HYDROCHLORIDE 15 ML: 10 INJECTION, SOLUTION INFILTRATION; PERINEURAL at 09:45

## 2020-07-17 RX ADMIN — LIDOCAINE HYDROCHLORIDE,EPINEPHRINE BITARTRATE 100 MG: 10; .01 INJECTION, SOLUTION INFILTRATION; PERINEURAL at 09:45

## 2020-07-17 RX ADMIN — LIDOCAINE HYDROCHLORIDE,EPINEPHRINE BITARTRATE 100 MG: 10; .01 INJECTION, SOLUTION INFILTRATION; PERINEURAL at 10:10

## 2020-07-17 NOTE — PROGRESS NOTES
Patient tolerated left breast biopsy x 2 sites well with scant bleeding. Biopsy sites were bandaged in the usual fashion and discharge instructions were reviewed with the patient. She was provided with a written copy as well. Advised patient that results should be available by Tuesday and that she will receive a phone call in the afternoon. Encouraged her to call with any questions or concerns.

## 2020-07-18 DIAGNOSIS — G47.9 SLEEP DISTURBANCE: ICD-10-CM

## 2020-07-18 DIAGNOSIS — F41.9 ANXIETY: ICD-10-CM

## 2020-07-22 DIAGNOSIS — N63.0 BREAST LUMP OR MASS: Primary | ICD-10-CM

## 2020-07-22 RX ORDER — ALPRAZOLAM 0.25 MG/1
TABLET ORAL
Qty: 30 TAB | Refills: 0 | Status: SHIPPED | OUTPATIENT
Start: 2020-07-22 | End: 2020-09-23 | Stop reason: SDUPTHER

## 2020-07-22 NOTE — PROGRESS NOTES
Dr. Maxx Ward called patient with pathology. Called patient with appointment information, Dr. Azalia Solis 8/5 at 8:08. Patient reports that the biopsy sites are healing well, there is some significant bruising and a little tenderness, but overall doing well. Encouraged her to call with any questions.

## 2020-07-24 ENCOUNTER — HOSPITAL ENCOUNTER (OUTPATIENT)
Dept: MRI IMAGING | Age: 65
Discharge: HOME | End: 2020-07-24
Attending: FAMILY MEDICINE
Payer: COMMERCIAL

## 2020-07-24 DIAGNOSIS — N63.0 BREAST LUMP OR MASS: ICD-10-CM

## 2020-07-24 PROCEDURE — A9585 GADOBUTROL INJECTION: HCPCS | Performed by: FAMILY MEDICINE

## 2020-07-24 PROCEDURE — 74011250636 HC RX REV CODE- 250/636: Performed by: FAMILY MEDICINE

## 2020-07-24 PROCEDURE — 74011000258 HC RX REV CODE- 258: Performed by: FAMILY MEDICINE

## 2020-07-24 PROCEDURE — 77049 MRI BREAST C-+ W/CAD BI: CPT

## 2020-07-24 RX ORDER — SODIUM CHLORIDE 0.9 % (FLUSH) 0.9 %
10 SYRINGE (ML) INJECTION
Status: COMPLETED | OUTPATIENT
Start: 2020-07-24 | End: 2020-07-24

## 2020-07-24 RX ADMIN — GADOBUTROL 10 ML: 604.72 INJECTION INTRAVENOUS at 15:20

## 2020-07-24 RX ADMIN — SODIUM CHLORIDE 100 ML: 900 INJECTION, SOLUTION INTRAVENOUS at 15:21

## 2020-07-24 RX ADMIN — Medication 10 ML: at 15:21

## 2020-08-05 ENCOUNTER — OFFICE VISIT (OUTPATIENT)
Dept: SURGERY | Age: 65
End: 2020-08-05
Payer: COMMERCIAL

## 2020-08-05 VITALS
HEART RATE: 98 BPM | BODY MASS INDEX: 30.61 KG/M2 | HEIGHT: 67 IN | WEIGHT: 195 LBS | TEMPERATURE: 97.6 F | SYSTOLIC BLOOD PRESSURE: 139 MMHG | DIASTOLIC BLOOD PRESSURE: 74 MMHG

## 2020-08-05 DIAGNOSIS — D05.12 DUCTAL CARCINOMA IN SITU (DCIS) OF LEFT BREAST WITH COMEDONECROSIS: Primary | ICD-10-CM

## 2020-08-05 DIAGNOSIS — Z86.000 HISTORY OF DUCTAL CARCINOMA IN SITU (DCIS) OF BREAST: ICD-10-CM

## 2020-08-05 PROBLEM — D05.02 LOBULAR CARCINOMA IN SITU (LCIS) OF LEFT BREAST: Status: ACTIVE | Noted: 2020-08-05

## 2020-08-05 PROCEDURE — 76642 ULTRASOUND BREAST LIMITED: CPT | Performed by: SURGERY

## 2020-08-05 PROCEDURE — 99245 OFF/OP CONSLTJ NEW/EST HI 55: CPT | Performed by: SURGERY

## 2020-08-05 NOTE — PROGRESS NOTES
HISTORY OF PRESENT ILLNESS  Leslie Arevalo is a 72 y.o. female. HPI  NEW patient presents for consultation at the request of Dr. 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  She is not feeling any breast lumps, has no nipple discharge/retraction or skin change. She has a history of RIGHT breast DCIS in 2010 and is S/P lumpectomy and XRT, and notes an occasionally painful area of scar tissue s/p surgery and XRT. Is here today with her . 07/17/20: LEFT breast biopsies. PATH:       - Site A: DCIS, nuclear grade 3 with comedonecrosis and associated calcifications, ER+(70%). Clinical stage 0.       - Site B: Focal PERINEURAL INVASION (see Comment). Sclerotic intraductal papilloma. Fibrocystic changes with usual ductal hyperplasia, columnar cell change and intraductal microcalcifications. Comment: Regarding the left breast site B core biopsy (specimen #2), there is a single focus of carcinoma present within a peripheral nerve sheath consistent with perineural invasion. No invasion is identified elsewhere within the biopsy specimen. This finding is concerning for the presence of invasive mammary carcinoma that was not sampled. A p63 and Smooth muscle myosin immunostain were performed and were both negative. There is insufficient tumor for biomarker testing. 2010: RIGHT breast DCIS, grade 2, ER+/CA+. Treated with lumpectomy, partial breast radiation (Dr. Ally Veloz), and Tamoxifen. FH - Paternal aunt was diagnosed with breast cancer in her late 45s to early 46s.                              Past Medical History:   Diagnosis Date    Allergic rhinitis     Arrhythmia     Breast cancer ([address] 2010    right breast    Breast cancer, left ([address] 2020    LEFT breast DCIS    Cancer ([address] 6/2010    right breast DCIS    Diverticulitis     Fracture     right wrist fx and surgery; pt fell     Hypertension     Retinal tear of left eye 2/6/14       Past Surgical History:   Procedure Laterality Date    HX BREAST BIOPSY Bilateral 1970 70's and 2010 benign (surgical)    HX BREAST BIOPSY Left 05/2010    benign mri bx    HX BREAST BIOPSY Right 03/2010    positive stereo    HX BREAST LUMPECTOMY  4/2010    right breast    HX OTHER SURGICAL Left 2/6/14     Retinal tear repair by Dr. Tushar Ling HX TONSILLECTOMY         Social History     Socioeconomic History    Marital status:      Spouse name: Not on file    Number of children: Not on file    Years of education: Not on file    Highest education level: Not on file   Occupational History    Not on file   Social Needs    Financial resource strain: Not on file    Food insecurity     Worry: Not on file     Inability: Not on file    Transportation needs     Medical: Not on file     Non-medical: Not on file   Tobacco Use    Smoking status: Never Smoker    Smokeless tobacco: Never Used   Substance and Sexual Activity    Alcohol use: No    Drug use: No    Sexual activity: Yes     Partners: Male   Lifestyle    Physical activity     Days per week: Not on file     Minutes per session: Not on file    Stress: Not on file   Relationships    Social connections     Talks on phone: Not on file     Gets together: Not on file     Attends Taoism service: Not on file     Active member of club or organization: Not on file     Attends meetings of clubs or organizations: Not on file     Relationship status: Not on file    Intimate partner violence     Fear of current or ex partner: Not on file     Emotionally abused: Not on file     Physically abused: Not on file     Forced sexual activity: Not on file   Other Topics Concern     Service No    Blood Transfusions No    Caffeine Concern No    Occupational Exposure No    Hobby Hazards No    Sleep Concern No    Stress Concern No    Weight Concern Yes    Special Diet No    Back Care No    Exercise No    Bike Helmet No  Seat Belt Yes    Self-Exams Yes   Social History Narrative    Not on file       Current Outpatient Medications on File Prior to Visit   Medication Sig Dispense Refill    ALPRAZolam (XANAX) 0.25 mg tablet take 1 tablet by mouth once daily if needed for anxiety 30 Tab 0    hydroCHLOROthiazide (HYDRODIURIL) 25 mg tablet TAKE 1 TABLET BY MOUTH EVERY DAY 90 Tab 1    olmesartan (BENICAR) 40 mg tablet Take 1 Tab by mouth daily. 90 Tab 1    acetaminophen (TYLENOL) 325 mg tablet Take 2 Tabs by mouth every six (6) hours as needed for Pain. 30 Tab 0    biotin 2,500 mcg tab Take 2,500 mcg by mouth daily.  triamcinolone (NASACORT AQ) 55 mcg nasal inhaler 2 Sprays by Both Nostrils route daily.  cyanocobalamin (VITAMIN B-12) 1,000 mcg tablet Take 1,000 mcg by mouth daily.  MULTIVITS-MIN/FA/CA CARB/VIT K (ONE-A-DAY WOMEN'S 50+ PO) Take 1 Tab by mouth daily.  loratadine (CLARITIN) 10 mg tablet Take 10 mg by mouth daily. No current facility-administered medications on file prior to visit. No Known Allergies    OB History    No obstetric history on file. Obstetric Comments   Menarche:  15. LMP: age 48. # of Children:  3. Age at Delivery of First Child:  22.   Hysterectomy/oophorectomy:  NO/NO. Breast Bx:  yes. Hx of Breast Feeding:  yes. BCP:  yes. Hormone therapy:  no.             ROS  Constitutional: Negative. HENT: Negative. Eyes: Negative. Respiratory: Negative. Cardiovascular: Negative. Gastrointestinal: Negative. Genitourinary: Negative. Musculoskeletal: Negative. Skin: Negative. Neurological: Negative. Endo/Heme/Allergies: Negative. Psychiatric/Behavioral: Negative. Physical Exam  Exam conducted with a chaperone present. Cardiovascular:      Rate and Rhythm: Normal rate and regular rhythm. Heart sounds: Normal heart sounds. Pulmonary:      Breath sounds: Normal breath sounds.    Chest:      Breasts: Breasts are symmetrical. Right: Normal. No swelling, bleeding, inverted nipple, mass, nipple discharge, skin change or tenderness. Left: Normal. No swelling, bleeding, inverted nipple, mass, nipple discharge, skin change or tenderness. Lymphadenopathy:      Cervical:      Right cervical: No superficial, deep or posterior cervical adenopathy. Left cervical: No superficial, deep or posterior cervical adenopathy. Upper Body:      Right upper body: No supraclavicular or axillary adenopathy. Left upper body: No supraclavicular or axillary adenopathy. BREAST ULTRASOUND, Pre-op Planning  Indication: Pre-op planning, LEFT breast  Technique: The area was scanned using a high-frequency linear-array near-field transducer  Findings: Bx clips at 4:00 and 12-1:00  Impression: Breast cancer  Disposition: Surgery    ASSESSMENT and PLAN    ICD-10-CM ICD-9-CM    1. Ductal carcinoma in situ (DCIS) of left breast with comedonecrosis  D05.12 233.0    2. History of ductal carcinoma in situ (DCIS) of breast  Z86.000 V13.89         Pt presents for consultation for treatment of LEFT breast DCIS, ER+, clinical stage 0. No palpable mass on exam. US visualizes bx clips at 4:00 and 12-1:00. At RIGHT breast, well healed incision s/p lumpectomy, skin looks good s/p XRT, no evidence of local recurrence. We had a long discussion of options for treatment. A total of 80 minutes were spent face-to-face with the patient, accompanied by her , during this encounter, and over half of that time was spent on counseling and coordination of care. We discussed in depth the pathology results, and the need for surgery. The goals of treatment are to treat the breast, ensure that there is no invasive component, and to reduce risk of recurrence. Discussed treatment options with risks, complications, benefits, and limitations, including lumpectomy with possible XRT, and mastectomy with reconstruction, both having the same cure rate.  Pt has considered mastectomy given her history of RIGHT breast DCIS, but is still undecided. We also discussed option for oncoplastic reduction in conjunction with a plastic surgeon. Discussed possible free nipple graft, which will be determined by plastic surgeon. Discussed RIGHT symmetry operation if pt chooses oncoplastic reduction. Also discussed skin-sparing and skin and nipple-sparing mastectomy and reconstruction options. We also covered risk reduction strategies as well as post-surgical therapies including possible XRT, and hormonal therapy with estrogen blocker. Discussed the use of DCISionRT to help determine need for XRT based on risk of invasive recurrence. Pt would not need XRT or hormonal therapy after a BL mastectomy. Also discussed genetic testing based on personal history. Pt will return for nurse visit for genetic testing. Will refer to plastic surgeon for further consultation, and plan further from there. Reviewed that surgery scheduling will depend on coordination with plastic surgeon. This plan was reviewed with the patient and patient agrees. All questions were answered.     Written by Cheryle Mir, as dictated by Dr. Eugenia Cano MD.

## 2020-08-05 NOTE — PROGRESS NOTES
HISTORY OF PRESENT ILLNESS Low Boudreaux is a 72 y.o. female. HPI   NEW patient presents for consultation at the request of Dr. Torres Patel for new diagnosis of LEFT breast DCIS diagnosed after an abnormal mammogram.  She is not feeling any breast lumps, has no nipple discharge/retraction or skin change. She has a history of RIGHT breast DCIS in 2010 and is S/P lumpectomy and XRT. Is here today with her . 07/17/20: LEFT breast biopsies. PATH: 
     - Site A: DCIS, nuclear grade 3 with comedonecrosis and associated calcifications, ER+(70%). - Site B: Focal PERINEURAL INVASION (see Comment). Sclerotic intraductal papilloma. Fibrocystic changes with usual ductal hyperplasia, columnar cell change and intraductal microcalcifications. Comment: Regarding the left breast site B core biopsy (specimen #2), there is a single focus of carcinoma present within a peripheral nerve sheath consistent with perineural invasion. No invasion is identified elsewhere within the biopsy specimen. This finding is concerning for the presence of invasive mammary carcinoma that was not sampled. A p63 and Smooth muscle myosin immunostain were performed and were both negative. There is insufficient tumor for biomarker testing. 2010: RIGHT breast DCIS, grade 2, ER+/VT+. Treated with lumpectomy, XRT, and Tamoxifen. FH - Paternal aunt was diagnosed with breast cancer in her late 45s to early 46s. Review of Systems Constitutional: Negative. HENT: Negative. Eyes: Negative. Respiratory: Negative. Cardiovascular: Negative. Gastrointestinal: Negative. Genitourinary: Negative. Musculoskeletal: Negative. Skin: Negative. Neurological: Negative. Endo/Heme/Allergies: Negative. Psychiatric/Behavioral: Negative. Physical Exam 
 
ASSESSMENT and PLAN 
{ASSESSMENT/PLAN:82303}

## 2020-08-05 NOTE — LETTER
8/6/2020 10:42 AM 
 
Patient:  Hailee Kim YOB: 1955 Date of Visit: 8/5/2020 Dear Dr. Everett Graham: Thank you for referring Ms. Mayi Amor to me for evaluation/treatment. Below are the relevant portions of my assessment and plan of care. If you have questions, please do not hesitate to call me. I look forward to following Ms. Sebastian along with you.  
 
 
 
Sincerely, 
 
 
Nba Stacy MD

## 2020-08-06 ENCOUNTER — DOCUMENTATION ONLY (OUTPATIENT)
Dept: SURGERY | Age: 65
End: 2020-08-06

## 2020-08-06 NOTE — PROGRESS NOTES
Returned 's Hello message asking if I had received the out of network form for patient. I called him back but had to L/M. I L/M that I had not received the form, but that I will check first thing in the morning. I am at [facility] today.

## 2020-08-10 ENCOUNTER — DOCUMENTATION ONLY (OUTPATIENT)
Dept: SURGERY | Age: 65
End: 2020-08-10

## 2020-08-10 NOTE — PROGRESS NOTES
[facility] Exception Waiver along with office note to [phone] for referral to Dr. Cipriano Mccollum, confirmation received. Copy placed up front to be scanned into patient's chart.

## 2020-08-12 ENCOUNTER — CLINICAL SUPPORT (OUTPATIENT)
Dept: SURGERY | Age: 65
End: 2020-08-12
Payer: COMMERCIAL

## 2020-08-12 DIAGNOSIS — D05.12 DUCTAL CARCINOMA IN SITU (DCIS) OF LEFT BREAST WITH COMEDONECROSIS: Primary | ICD-10-CM

## 2020-08-12 DIAGNOSIS — Z80.3 FAMILY HISTORY OF BREAST CANCER: ICD-10-CM

## 2020-08-12 DIAGNOSIS — Z86.000 HISTORY OF DUCTAL CARCINOMA IN SITU (DCIS) OF BREAST: ICD-10-CM

## 2020-08-12 PROCEDURE — 99024 POSTOP FOLLOW-UP VISIT: CPT | Performed by: SURGERY

## 2020-08-12 NOTE — PROGRESS NOTES
Saliva collected for [employer] breast panel. Forms completed, all questions answered.       Specimen will be sent via [facility] to [facility].

## 2020-08-14 DIAGNOSIS — Z86.000 HISTORY OF CARCINOMA IN SITU OF BREAST: ICD-10-CM

## 2020-08-14 DIAGNOSIS — D05.12 DUCTAL CARCINOMA IN SITU OF LEFT BREAST: Primary | ICD-10-CM

## 2020-08-19 DIAGNOSIS — D05.12 DUCTAL CARCINOMA IN SITU OF LEFT BREAST: Primary | ICD-10-CM

## 2020-08-20 ENCOUNTER — DOCUMENTATION ONLY (OUTPATIENT)
Dept: SURGERY | Age: 65
End: 2020-08-20

## 2020-08-20 NOTE — PROGRESS NOTES
Received call from [facility], re: genetic counseling is required for genetic testing, so [facility] is helping the patient to set this up.

## 2020-09-05 DIAGNOSIS — I10 ESSENTIAL HYPERTENSION: ICD-10-CM

## 2020-09-05 RX ORDER — HYDROCHLOROTHIAZIDE 25 MG/1
TABLET ORAL
Qty: 90 TAB | Refills: 1 | Status: SHIPPED | OUTPATIENT
Start: 2020-09-05 | End: 2021-09-22 | Stop reason: SDUPTHER

## 2020-09-09 ENCOUNTER — TELEPHONE (OUTPATIENT)
Dept: SURGERY | Age: 65
End: 2020-09-09

## 2020-09-09 RX ORDER — ANASTROZOLE 1 MG/1
1 TABLET ORAL DAILY
Qty: 30 TAB | Refills: 5 | Status: SHIPPED | OUTPATIENT
Start: 2020-09-09 | End: 2020-11-25 | Stop reason: ALTCHOICE

## 2020-09-09 NOTE — TELEPHONE ENCOUNTER
Dr. Danielle Si first surgery date Pct 25. Will put on arimidex until surgery and stop one  Week prior. Script sent to pharmacy.

## 2020-09-10 DIAGNOSIS — D05.12 DUCTAL CARCINOMA IN SITU OF LEFT BREAST: Primary | ICD-10-CM

## 2020-09-23 DIAGNOSIS — G47.9 SLEEP DISTURBANCE: ICD-10-CM

## 2020-09-23 DIAGNOSIS — F41.9 ANXIETY: ICD-10-CM

## 2020-09-24 DIAGNOSIS — D05.12 DUCTAL CARCINOMA IN SITU OF LEFT BREAST: Primary | ICD-10-CM

## 2020-09-24 NOTE — TELEPHONE ENCOUNTER
No access to      Last visit 04/22/2020 Virtual visit MD Janine Perez   Next appointment None   Previous refill encounter(s) 07/22/2020 Xanax #30     Requested Prescriptions     Pending Prescriptions Disp Refills    ALPRAZolam (XANAX) 0.25 mg tablet 30 Tab 0     Sig: take 1 tablet by mouth once daily if needed for anxiety

## 2020-09-26 RX ORDER — ALPRAZOLAM 0.25 MG/1
TABLET ORAL
Qty: 30 TAB | Refills: 0 | Status: SHIPPED | OUTPATIENT
Start: 2020-09-26 | End: 2020-10-23

## 2020-10-06 LAB — GENE DIS ANL INTERP-IMP: NORMAL

## 2020-10-08 ENCOUNTER — HOSPITAL ENCOUNTER (OUTPATIENT)
Dept: PREADMISSION TESTING | Age: 65
Discharge: HOME | End: 2020-10-08
Payer: COMMERCIAL

## 2020-10-08 VITALS
DIASTOLIC BLOOD PRESSURE: 75 MMHG | RESPIRATION RATE: 16 BRPM | TEMPERATURE: 98.2 F | WEIGHT: 197.75 LBS | HEIGHT: 67 IN | BODY MASS INDEX: 31.04 KG/M2 | HEART RATE: 87 BPM | SYSTOLIC BLOOD PRESSURE: 148 MMHG

## 2020-10-08 LAB
ANION GAP SERPL CALC-SCNC: 9 MMOL/L (ref 5–15)
BASOPHILS # BLD: 0 K/UL (ref 0–0.1)
BASOPHILS NFR BLD: 0 % (ref 0–1)
BUN SERPL-MCNC: 31 MG/DL (ref 6–20)
BUN/CREAT SERPL: 27 (ref 12–20)
CALCIUM SERPL-MCNC: 9.5 MG/DL (ref 8.5–10.1)
CHLORIDE SERPL-SCNC: 102 MMOL/L (ref 97–108)
CO2 SERPL-SCNC: 24 MMOL/L (ref 21–32)
CREAT SERPL-MCNC: 1.16 MG/DL (ref 0.55–1.02)
DIFFERENTIAL METHOD BLD: ABNORMAL
EOSINOPHIL # BLD: 0.2 K/UL (ref 0–0.4)
EOSINOPHIL NFR BLD: 2 % (ref 0–7)
ERYTHROCYTE [DISTWIDTH] IN BLOOD BY AUTOMATED COUNT: 13 % (ref 11.5–14.5)
GLUCOSE SERPL-MCNC: 85 MG/DL (ref 65–100)
HCT VFR BLD AUTO: 35.8 % (ref 35–47)
HGB BLD-MCNC: 11.6 G/DL (ref 11.5–16)
IMM GRANULOCYTES # BLD AUTO: 0.1 K/UL (ref 0–0.04)
IMM GRANULOCYTES NFR BLD AUTO: 0 % (ref 0–0.5)
LYMPHOCYTES # BLD: 2.9 K/UL (ref 0.8–3.5)
LYMPHOCYTES NFR BLD: 26 % (ref 12–49)
MCH RBC QN AUTO: 29.3 PG (ref 26–34)
MCHC RBC AUTO-ENTMCNC: 32.4 G/DL (ref 30–36.5)
MCV RBC AUTO: 90.4 FL (ref 80–99)
MONOCYTES # BLD: 0.8 K/UL (ref 0–1)
MONOCYTES NFR BLD: 7 % (ref 5–13)
NEUTS SEG # BLD: 7.3 K/UL (ref 1.8–8)
NEUTS SEG NFR BLD: 65 % (ref 32–75)
NRBC # BLD: 0 K/UL (ref 0–0.01)
NRBC BLD-RTO: 0 PER 100 WBC
PLATELET # BLD AUTO: 360 K/UL (ref 150–400)
PMV BLD AUTO: 10.3 FL (ref 8.9–12.9)
POTASSIUM SERPL-SCNC: 4.1 MMOL/L (ref 3.5–5.1)
RBC # BLD AUTO: 3.96 M/UL (ref 3.8–5.2)
SODIUM SERPL-SCNC: 135 MMOL/L (ref 136–145)
WBC # BLD AUTO: 11.2 K/UL (ref 3.6–11)

## 2020-10-08 PROCEDURE — 93005 ELECTROCARDIOGRAM TRACING: CPT

## 2020-10-08 PROCEDURE — 36415 COLL VENOUS BLD VENIPUNCTURE: CPT

## 2020-10-08 PROCEDURE — 85025 COMPLETE CBC W/AUTO DIFF WBC: CPT

## 2020-10-08 PROCEDURE — 80048 BASIC METABOLIC PNL TOTAL CA: CPT

## 2020-10-08 RX ORDER — OMEPRAZOLE 20 MG/1
20 CAPSULE, DELAYED RELEASE ORAL
COMMUNITY

## 2020-10-08 NOTE — PERIOP NOTES
Patient given surgical site infection FAQs handout and hand hygiene tips sheet. Pre-operative instructions reviewed and patient verbalizes understanding of instructions. Patient has been given the opportunity to ask additional questions. Pt given 2 bottles of CHG soap and instructed in use. Pt instructed that they will be scheduled for COVID 19 test 4 days prior to surgery. COVID instruction sheet and instructions given to PT. Pt instructed they must self quarantine between  COVID 19 test and day of surgery. Parking deck instructions  given to patient. Instructions reviewed with patient.

## 2020-10-09 ENCOUNTER — TELEPHONE (OUTPATIENT)
Dept: SURGERY | Age: 65
End: 2020-10-09

## 2020-10-09 ENCOUNTER — DOCUMENTATION ONLY (OUTPATIENT)
Dept: SURGERY | Age: 65
End: 2020-10-09

## 2020-10-09 LAB
ATRIAL RATE: 71 BPM
CALCULATED P AXIS, ECG09: 37 DEGREES
CALCULATED R AXIS, ECG10: 61 DEGREES
CALCULATED T AXIS, ECG11: 35 DEGREES
DIAGNOSIS, 93000: NORMAL
P-R INTERVAL, ECG05: 136 MS
Q-T INTERVAL, ECG07: 386 MS
QRS DURATION, ECG06: 90 MS
QTC CALCULATION (BEZET), ECG08: 419 MS
VENTRICULAR RATE, ECG03: 71 BPM

## 2020-10-09 NOTE — PROGRESS NOTES
The patient called after being seen in PAT asking what to do about taking her Arimidex? Her surgery is scheduled for 10/22/20 BL mastectomy with reconstruction. Per Dr. Phani Jamil the patient is to stop the Arimidex one week prior to surgery.  This message was left on the patient's cell phone per her request.

## 2020-10-09 NOTE — TELEPHONE ENCOUNTER
Louise at [facility] PAT called with abnormal PAT labs. Sodium low, WBC slightly high, BUN/creatinine elevated. Dr. Erasmo Santizo informed.

## 2020-10-09 NOTE — PERIOP NOTES
10/9/20 @ 1421 - RESULTS FAXED TO DR. German Sal AND SPOKE WITH GISELA, NURSE FOR DR. BORGES IN REFERENCE TO ABNORMAL LABS. Rosana BORGES AWARE OF ABN. LABS.

## 2020-10-13 ENCOUNTER — TRANSCRIBE ORDER (OUTPATIENT)
Dept: REGISTRATION | Age: 65
End: 2020-10-13

## 2020-10-13 DIAGNOSIS — Z01.812 PRE-PROCEDURE LAB EXAM: Primary | ICD-10-CM

## 2020-10-18 ENCOUNTER — HOSPITAL ENCOUNTER (OUTPATIENT)
Dept: PREADMISSION TESTING | Age: 65
Discharge: HOME | End: 2020-10-18
Payer: COMMERCIAL

## 2020-10-18 DIAGNOSIS — Z01.812 PRE-PROCEDURE LAB EXAM: ICD-10-CM

## 2020-10-18 PROCEDURE — 87635 SARS-COV-2 COVID-19 AMP PRB: CPT

## 2020-10-19 LAB — SARS-COV-2, COV2NT: NOT DETECTED

## 2020-10-21 DIAGNOSIS — D05.12 INTRADUCTAL CARCINOMA IN SITU OF LEFT BREAST: ICD-10-CM

## 2020-10-21 DIAGNOSIS — C50.912 MALIGNANT NEOPLASM OF LEFT FEMALE BREAST, UNSPECIFIED ESTROGEN RECEPTOR STATUS, UNSPECIFIED SITE OF BREAST (HCC): Primary | ICD-10-CM

## 2020-10-22 ENCOUNTER — HOSPITAL ENCOUNTER (OUTPATIENT)
Age: 65
Setting detail: OBSERVATION
Discharge: HOME | End: 2020-10-23
Attending: SURGERY | Admitting: PLASTIC SURGERY
Payer: COMMERCIAL

## 2020-10-22 ENCOUNTER — APPOINTMENT (OUTPATIENT)
Dept: NUCLEAR MEDICINE | Age: 65
End: 2020-10-22
Attending: SURGERY
Payer: COMMERCIAL

## 2020-10-22 ENCOUNTER — ANESTHESIA (OUTPATIENT)
Dept: MEDSURG UNIT | Age: 65
End: 2020-10-22
Payer: COMMERCIAL

## 2020-10-22 ENCOUNTER — ANESTHESIA EVENT (OUTPATIENT)
Dept: MEDSURG UNIT | Age: 65
End: 2020-10-22
Payer: COMMERCIAL

## 2020-10-22 DIAGNOSIS — C50.912 MALIGNANT NEOPLASM OF LEFT FEMALE BREAST, UNSPECIFIED ESTROGEN RECEPTOR STATUS, UNSPECIFIED SITE OF BREAST (HCC): ICD-10-CM

## 2020-10-22 DIAGNOSIS — D05.12 DUCTAL CARCINOMA IN SITU OF LEFT BREAST: ICD-10-CM

## 2020-10-22 DIAGNOSIS — D05.12 INTRADUCTAL CARCINOMA IN SITU OF LEFT BREAST: ICD-10-CM

## 2020-10-22 PROBLEM — C50.919 BREAST CANCER (HCC): Status: ACTIVE | Noted: 2020-10-22

## 2020-10-22 LAB
GLUCOSE BLD STRIP.AUTO-MCNC: 84 MG/DL (ref 65–100)
SERVICE CMNT-IMP: NORMAL

## 2020-10-22 PROCEDURE — 19303 MAST SIMPLE COMPLETE: CPT | Performed by: SURGERY

## 2020-10-22 PROCEDURE — 2709999900 HC NON-CHARGEABLE SUPPLY: Performed by: SURGERY

## 2020-10-22 PROCEDURE — 74011000250 HC RX REV CODE- 250: Performed by: PLASTIC SURGERY

## 2020-10-22 PROCEDURE — 77030038213 HC SUPP BRA COMPRSS PSTOP COND -B: Performed by: SURGERY

## 2020-10-22 PROCEDURE — 99218 HC RM OBSERVATION: CPT

## 2020-10-22 PROCEDURE — 74011000272 HC RX REV CODE- 272: Performed by: PLASTIC SURGERY

## 2020-10-22 PROCEDURE — C9290 INJ, BUPIVACAINE LIPOSOME: HCPCS | Performed by: PLASTIC SURGERY

## 2020-10-22 PROCEDURE — 74011250636 HC RX REV CODE- 250/636: Performed by: PLASTIC SURGERY

## 2020-10-22 PROCEDURE — 88342 IMHCHEM/IMCYTCHM 1ST ANTB: CPT

## 2020-10-22 PROCEDURE — 88307 TISSUE EXAM BY PATHOLOGIST: CPT

## 2020-10-22 PROCEDURE — 77030040922 HC BLNKT HYPOTHRM STRY -A

## 2020-10-22 PROCEDURE — 76060000069 HC AMB SURG ANES 4.5 TO 5 HR: Performed by: SURGERY

## 2020-10-22 PROCEDURE — 77030026227 HC FUNL KELLR 2 PCH ALGN -C: Performed by: SURGERY

## 2020-10-22 PROCEDURE — 74011000258 HC RX REV CODE- 258: Performed by: PLASTIC SURGERY

## 2020-10-22 PROCEDURE — 77030002933 HC SUT MCRYL J&J -A: Performed by: SURGERY

## 2020-10-22 PROCEDURE — 74011250637 HC RX REV CODE- 250/637: Performed by: PLASTIC SURGERY

## 2020-10-22 PROCEDURE — 77030040361 HC SLV COMPR DVT MDII -B: Performed by: SURGERY

## 2020-10-22 PROCEDURE — 74011250636 HC RX REV CODE- 250/636: Performed by: SURGERY

## 2020-10-22 PROCEDURE — 77030018836 HC SOL IRR NACL ICUM -A: Performed by: SURGERY

## 2020-10-22 PROCEDURE — 74011250636 HC RX REV CODE- 250/636: Performed by: NURSE ANESTHETIST, CERTIFIED REGISTERED

## 2020-10-22 PROCEDURE — 77030026438 HC STYL ET INTUB CARD -A: Performed by: NURSE ANESTHETIST, CERTIFIED REGISTERED

## 2020-10-22 PROCEDURE — 77030040504 HC DRN WND MDII -B: Performed by: SURGERY

## 2020-10-22 PROCEDURE — 2709999900 HC NON-CHARGEABLE SUPPLY

## 2020-10-22 PROCEDURE — 74011000250 HC RX REV CODE- 250: Performed by: NURSE ANESTHETIST, CERTIFIED REGISTERED

## 2020-10-22 PROCEDURE — 82962 GLUCOSE BLOOD TEST: CPT

## 2020-10-22 PROCEDURE — 77030010507 HC ADH SKN DERMBND J&J -B: Performed by: SURGERY

## 2020-10-22 PROCEDURE — 76030000009 HC AMB SURG OR TIME 4.5 TO 5: Performed by: SURGERY

## 2020-10-22 PROCEDURE — C1789 PROSTHESIS, BREAST, IMP: HCPCS | Performed by: SURGERY

## 2020-10-22 PROCEDURE — 77030011267 HC ELECTRD BLD COVD -A: Performed by: SURGERY

## 2020-10-22 PROCEDURE — 38525 BIOPSY/REMOVAL LYMPH NODES: CPT | Performed by: SURGERY

## 2020-10-22 PROCEDURE — 77030008684 HC TU ET CUF COVD -B: Performed by: NURSE ANESTHETIST, CERTIFIED REGISTERED

## 2020-10-22 PROCEDURE — 77030040506 HC DRN WND MDII -A: Performed by: SURGERY

## 2020-10-22 PROCEDURE — 77030002996 HC SUT SLK J&J -A: Performed by: SURGERY

## 2020-10-22 PROCEDURE — 77030019702 HC WRP THER MENM -C: Performed by: SURGERY

## 2020-10-22 PROCEDURE — 77030041680 HC PNCL ELECSURG SMK EVAC CNMD -B: Performed by: SURGERY

## 2020-10-22 PROCEDURE — 76210000035 HC AMBSU PH I REC 1 TO 1.5 HR: Performed by: SURGERY

## 2020-10-22 PROCEDURE — 77030008462 HC STPLR SKN PROX J&J -A: Performed by: SURGERY

## 2020-10-22 PROCEDURE — A9520 TC99 TILMANOCEPT DIAG 0.5MCI: HCPCS

## 2020-10-22 PROCEDURE — 77030002966 HC SUT PDS J&J -A: Performed by: SURGERY

## 2020-10-22 DEVICE — IMPLANTABLE DEVICE: Type: IMPLANTABLE DEVICE | Site: BREAST | Status: FUNCTIONAL

## 2020-10-22 DEVICE — GRAFT HUM TISS W16XL20CM THK0.4-1.6MM REGEN TISS MTRX PERF: Type: IMPLANTABLE DEVICE | Site: BREAST | Status: FUNCTIONAL

## 2020-10-22 RX ORDER — HYDROMORPHONE HYDROCHLORIDE 1 MG/ML
0.2 INJECTION, SOLUTION INTRAMUSCULAR; INTRAVENOUS; SUBCUTANEOUS
Status: DISCONTINUED | OUTPATIENT
Start: 2020-10-22 | End: 2020-10-22 | Stop reason: HOSPADM

## 2020-10-22 RX ORDER — LIDOCAINE HYDROCHLORIDE 20 MG/ML
INJECTION, SOLUTION EPIDURAL; INFILTRATION; INTRACAUDAL; PERINEURAL AS NEEDED
Status: DISCONTINUED | OUTPATIENT
Start: 2020-10-22 | End: 2020-10-22 | Stop reason: HOSPADM

## 2020-10-22 RX ORDER — SODIUM CHLORIDE 9 MG/ML
50 INJECTION, SOLUTION INTRAVENOUS CONTINUOUS
Status: DISCONTINUED | OUTPATIENT
Start: 2020-10-22 | End: 2020-10-22 | Stop reason: HOSPADM

## 2020-10-22 RX ORDER — SODIUM CHLORIDE, SODIUM LACTATE, POTASSIUM CHLORIDE, CALCIUM CHLORIDE 600; 310; 30; 20 MG/100ML; MG/100ML; MG/100ML; MG/100ML
75 INJECTION, SOLUTION INTRAVENOUS CONTINUOUS
Status: DISCONTINUED | OUTPATIENT
Start: 2020-10-22 | End: 2020-10-22 | Stop reason: HOSPADM

## 2020-10-22 RX ORDER — MORPHINE SULFATE 10 MG/ML
2 INJECTION, SOLUTION INTRAMUSCULAR; INTRAVENOUS
Status: DISCONTINUED | OUTPATIENT
Start: 2020-10-22 | End: 2020-10-22 | Stop reason: HOSPADM

## 2020-10-22 RX ORDER — ROCURONIUM BROMIDE 10 MG/ML
INJECTION, SOLUTION INTRAVENOUS AS NEEDED
Status: DISCONTINUED | OUTPATIENT
Start: 2020-10-22 | End: 2020-10-22 | Stop reason: HOSPADM

## 2020-10-22 RX ORDER — HYDROCHLOROTHIAZIDE 25 MG/1
25 TABLET ORAL
Status: DISCONTINUED | OUTPATIENT
Start: 2020-10-23 | End: 2020-10-23 | Stop reason: HOSPADM

## 2020-10-22 RX ORDER — GLYCOPYRROLATE 0.2 MG/ML
INJECTION INTRAMUSCULAR; INTRAVENOUS AS NEEDED
Status: DISCONTINUED | OUTPATIENT
Start: 2020-10-22 | End: 2020-10-22 | Stop reason: HOSPADM

## 2020-10-22 RX ORDER — KETAMINE HYDROCHLORIDE 10 MG/ML
INJECTION, SOLUTION INTRAMUSCULAR; INTRAVENOUS AS NEEDED
Status: DISCONTINUED | OUTPATIENT
Start: 2020-10-22 | End: 2020-10-22 | Stop reason: HOSPADM

## 2020-10-22 RX ORDER — DEXTROSE, SODIUM CHLORIDE, AND POTASSIUM CHLORIDE 5; .45; .15 G/100ML; G/100ML; G/100ML
50 INJECTION INTRAVENOUS CONTINUOUS
Status: DISCONTINUED | OUTPATIENT
Start: 2020-10-22 | End: 2020-10-23 | Stop reason: HOSPADM

## 2020-10-22 RX ORDER — ONDANSETRON 2 MG/ML
4 INJECTION INTRAMUSCULAR; INTRAVENOUS AS NEEDED
Status: DISCONTINUED | OUTPATIENT
Start: 2020-10-22 | End: 2020-10-22 | Stop reason: HOSPADM

## 2020-10-22 RX ORDER — ONDANSETRON 2 MG/ML
INJECTION INTRAMUSCULAR; INTRAVENOUS AS NEEDED
Status: DISCONTINUED | OUTPATIENT
Start: 2020-10-22 | End: 2020-10-22 | Stop reason: HOSPADM

## 2020-10-22 RX ORDER — MIDAZOLAM HYDROCHLORIDE 1 MG/ML
0.5 INJECTION, SOLUTION INTRAMUSCULAR; INTRAVENOUS
Status: DISCONTINUED | OUTPATIENT
Start: 2020-10-22 | End: 2020-10-22 | Stop reason: HOSPADM

## 2020-10-22 RX ORDER — MIDAZOLAM HYDROCHLORIDE 1 MG/ML
1 INJECTION, SOLUTION INTRAMUSCULAR; INTRAVENOUS AS NEEDED
Status: DISCONTINUED | OUTPATIENT
Start: 2020-10-22 | End: 2020-10-22 | Stop reason: HOSPADM

## 2020-10-22 RX ORDER — HYDROMORPHONE HYDROCHLORIDE 2 MG/1
2-4 TABLET ORAL
Status: DISCONTINUED | OUTPATIENT
Start: 2020-10-22 | End: 2020-10-23 | Stop reason: HOSPADM

## 2020-10-22 RX ORDER — OXYCODONE AND ACETAMINOPHEN 5; 325 MG/1; MG/1
1 TABLET ORAL AS NEEDED
Status: DISCONTINUED | OUTPATIENT
Start: 2020-10-22 | End: 2020-10-22 | Stop reason: HOSPADM

## 2020-10-22 RX ORDER — ONDANSETRON 2 MG/ML
4 INJECTION INTRAMUSCULAR; INTRAVENOUS
Status: DISCONTINUED | OUTPATIENT
Start: 2020-10-22 | End: 2020-10-23 | Stop reason: HOSPADM

## 2020-10-22 RX ORDER — SODIUM CHLORIDE, SODIUM LACTATE, POTASSIUM CHLORIDE, CALCIUM CHLORIDE 600; 310; 30; 20 MG/100ML; MG/100ML; MG/100ML; MG/100ML
INJECTION, SOLUTION INTRAVENOUS
Status: DISCONTINUED | OUTPATIENT
Start: 2020-10-22 | End: 2020-10-22 | Stop reason: HOSPADM

## 2020-10-22 RX ORDER — EPHEDRINE SULFATE/0.9% NACL/PF 50 MG/5 ML
SYRINGE (ML) INTRAVENOUS AS NEEDED
Status: DISCONTINUED | OUTPATIENT
Start: 2020-10-22 | End: 2020-10-22 | Stop reason: HOSPADM

## 2020-10-22 RX ORDER — CEFAZOLIN SODIUM/WATER 2 G/20 ML
2 SYRINGE (ML) INTRAVENOUS
Status: COMPLETED | OUTPATIENT
Start: 2020-10-22 | End: 2020-10-22

## 2020-10-22 RX ORDER — SODIUM CHLORIDE 0.9 % (FLUSH) 0.9 %
5-40 SYRINGE (ML) INJECTION EVERY 8 HOURS
Status: DISCONTINUED | OUTPATIENT
Start: 2020-10-22 | End: 2020-10-22 | Stop reason: HOSPADM

## 2020-10-22 RX ORDER — FENTANYL CITRATE 50 UG/ML
25 INJECTION, SOLUTION INTRAMUSCULAR; INTRAVENOUS
Status: DISCONTINUED | OUTPATIENT
Start: 2020-10-22 | End: 2020-10-22 | Stop reason: HOSPADM

## 2020-10-22 RX ORDER — BUPIVACAINE HYDROCHLORIDE 2.5 MG/ML
INJECTION, SOLUTION EPIDURAL; INFILTRATION; INTRACAUDAL AS NEEDED
Status: DISCONTINUED | OUTPATIENT
Start: 2020-10-22 | End: 2020-10-22 | Stop reason: HOSPADM

## 2020-10-22 RX ORDER — FENTANYL CITRATE 50 UG/ML
INJECTION, SOLUTION INTRAMUSCULAR; INTRAVENOUS AS NEEDED
Status: DISCONTINUED | OUTPATIENT
Start: 2020-10-22 | End: 2020-10-22 | Stop reason: HOSPADM

## 2020-10-22 RX ORDER — HYDROMORPHONE HYDROCHLORIDE 2 MG/ML
INJECTION, SOLUTION INTRAMUSCULAR; INTRAVENOUS; SUBCUTANEOUS AS NEEDED
Status: DISCONTINUED | OUTPATIENT
Start: 2020-10-22 | End: 2020-10-22 | Stop reason: HOSPADM

## 2020-10-22 RX ORDER — FENTANYL CITRATE 50 UG/ML
50 INJECTION, SOLUTION INTRAMUSCULAR; INTRAVENOUS AS NEEDED
Status: DISCONTINUED | OUTPATIENT
Start: 2020-10-22 | End: 2020-10-22 | Stop reason: HOSPADM

## 2020-10-22 RX ORDER — OLMESARTAN MEDOXOMIL 40 MG/1
40 TABLET ORAL
Status: DISCONTINUED | OUTPATIENT
Start: 2020-10-22 | End: 2020-10-23 | Stop reason: HOSPADM

## 2020-10-22 RX ORDER — SODIUM CHLORIDE 0.9 % (FLUSH) 0.9 %
5-40 SYRINGE (ML) INJECTION AS NEEDED
Status: DISCONTINUED | OUTPATIENT
Start: 2020-10-22 | End: 2020-10-22 | Stop reason: HOSPADM

## 2020-10-22 RX ORDER — PROPOFOL 10 MG/ML
INJECTION, EMULSION INTRAVENOUS AS NEEDED
Status: DISCONTINUED | OUTPATIENT
Start: 2020-10-22 | End: 2020-10-22 | Stop reason: HOSPADM

## 2020-10-22 RX ORDER — MIDAZOLAM HYDROCHLORIDE 1 MG/ML
INJECTION, SOLUTION INTRAMUSCULAR; INTRAVENOUS AS NEEDED
Status: DISCONTINUED | OUTPATIENT
Start: 2020-10-22 | End: 2020-10-22 | Stop reason: HOSPADM

## 2020-10-22 RX ORDER — DEXAMETHASONE SODIUM PHOSPHATE 4 MG/ML
INJECTION, SOLUTION INTRA-ARTICULAR; INTRALESIONAL; INTRAMUSCULAR; INTRAVENOUS; SOFT TISSUE AS NEEDED
Status: DISCONTINUED | OUTPATIENT
Start: 2020-10-22 | End: 2020-10-22 | Stop reason: HOSPADM

## 2020-10-22 RX ORDER — DIPHENHYDRAMINE HYDROCHLORIDE 50 MG/ML
12.5 INJECTION, SOLUTION INTRAMUSCULAR; INTRAVENOUS AS NEEDED
Status: DISCONTINUED | OUTPATIENT
Start: 2020-10-22 | End: 2020-10-22 | Stop reason: HOSPADM

## 2020-10-22 RX ORDER — LIDOCAINE HYDROCHLORIDE 10 MG/ML
0.1 INJECTION, SOLUTION EPIDURAL; INFILTRATION; INTRACAUDAL; PERINEURAL AS NEEDED
Status: DISCONTINUED | OUTPATIENT
Start: 2020-10-22 | End: 2020-10-22 | Stop reason: HOSPADM

## 2020-10-22 RX ORDER — DEXMEDETOMIDINE HYDROCHLORIDE 100 UG/ML
INJECTION, SOLUTION INTRAVENOUS AS NEEDED
Status: DISCONTINUED | OUTPATIENT
Start: 2020-10-22 | End: 2020-10-22 | Stop reason: HOSPADM

## 2020-10-22 RX ORDER — SUCCINYLCHOLINE CHLORIDE 20 MG/ML
INJECTION INTRAMUSCULAR; INTRAVENOUS AS NEEDED
Status: DISCONTINUED | OUTPATIENT
Start: 2020-10-22 | End: 2020-10-22 | Stop reason: HOSPADM

## 2020-10-22 RX ORDER — NEOSTIGMINE METHYLSULFATE 1 MG/ML
INJECTION, SOLUTION INTRAVENOUS AS NEEDED
Status: DISCONTINUED | OUTPATIENT
Start: 2020-10-22 | End: 2020-10-22 | Stop reason: HOSPADM

## 2020-10-22 RX ADMIN — DEXAMETHASONE SODIUM PHOSPHATE 4 MG: 4 INJECTION, SOLUTION INTRAMUSCULAR; INTRAVENOUS at 10:16

## 2020-10-22 RX ADMIN — ROCURONIUM BROMIDE 45 MG: 10 SOLUTION INTRAVENOUS at 10:16

## 2020-10-22 RX ADMIN — PHENYLEPHRINE HYDROCHLORIDE 80 MCG: 10 INJECTION INTRAVENOUS at 13:40

## 2020-10-22 RX ADMIN — GLYCOPYRROLATE 0.4 MG: 0.2 INJECTION, SOLUTION INTRAMUSCULAR; INTRAVENOUS at 14:38

## 2020-10-22 RX ADMIN — PHENYLEPHRINE HYDROCHLORIDE 80 MCG: 10 INJECTION INTRAVENOUS at 10:24

## 2020-10-22 RX ADMIN — Medication 3 MG: at 14:38

## 2020-10-22 RX ADMIN — KETAMINE HYDROCHLORIDE 40 MG: 10 INJECTION, SOLUTION INTRAMUSCULAR; INTRAVENOUS at 10:12

## 2020-10-22 RX ADMIN — FENTANYL CITRATE 50 MCG: 50 INJECTION, SOLUTION INTRAMUSCULAR; INTRAVENOUS at 10:07

## 2020-10-22 RX ADMIN — PHENYLEPHRINE HYDROCHLORIDE 40 MCG: 10 INJECTION INTRAVENOUS at 14:00

## 2020-10-22 RX ADMIN — PHENYLEPHRINE HYDROCHLORIDE 80 MCG: 10 INJECTION INTRAVENOUS at 13:00

## 2020-10-22 RX ADMIN — FENTANYL CITRATE 25 MCG: 50 INJECTION, SOLUTION INTRAMUSCULAR; INTRAVENOUS at 10:41

## 2020-10-22 RX ADMIN — PHENYLEPHRINE HYDROCHLORIDE 120 MCG: 10 INJECTION INTRAVENOUS at 10:32

## 2020-10-22 RX ADMIN — PHENYLEPHRINE HYDROCHLORIDE 80 MCG: 10 INJECTION INTRAVENOUS at 11:50

## 2020-10-22 RX ADMIN — PHENYLEPHRINE HYDROCHLORIDE 80 MCG: 10 INJECTION INTRAVENOUS at 10:29

## 2020-10-22 RX ADMIN — ROCURONIUM BROMIDE 5 MG: 10 SOLUTION INTRAVENOUS at 10:07

## 2020-10-22 RX ADMIN — Medication 10 MG: at 14:13

## 2020-10-22 RX ADMIN — HYDROMORPHONE HYDROCHLORIDE 2 MG: 2 TABLET ORAL at 19:04

## 2020-10-22 RX ADMIN — PHENYLEPHRINE HYDROCHLORIDE 80 MCG: 10 INJECTION INTRAVENOUS at 13:18

## 2020-10-22 RX ADMIN — DEXMEDETOMIDINE HYDROCHLORIDE 10 MCG: 100 INJECTION, SOLUTION, CONCENTRATE INTRAVENOUS at 10:16

## 2020-10-22 RX ADMIN — LIDOCAINE HYDROCHLORIDE 60 MG: 20 INJECTION, SOLUTION EPIDURAL; INFILTRATION; INTRACAUDAL; PERINEURAL at 10:07

## 2020-10-22 RX ADMIN — SODIUM CHLORIDE, POTASSIUM CHLORIDE, SODIUM LACTATE AND CALCIUM CHLORIDE: 600; 310; 30; 20 INJECTION, SOLUTION INTRAVENOUS at 11:13

## 2020-10-22 RX ADMIN — DEXMEDETOMIDINE HYDROCHLORIDE 10 MCG: 100 INJECTION, SOLUTION, CONCENTRATE INTRAVENOUS at 10:12

## 2020-10-22 RX ADMIN — Medication 2 G: at 10:17

## 2020-10-22 RX ADMIN — HYDROMORPHONE HYDROCHLORIDE 0.5 MG: 2 INJECTION, SOLUTION INTRAMUSCULAR; INTRAVENOUS; SUBCUTANEOUS at 11:26

## 2020-10-22 RX ADMIN — ONDANSETRON HYDROCHLORIDE 4 MG: 2 INJECTION, SOLUTION INTRAMUSCULAR; INTRAVENOUS at 10:16

## 2020-10-22 RX ADMIN — PHENYLEPHRINE HYDROCHLORIDE 80 MCG: 10 INJECTION INTRAVENOUS at 13:15

## 2020-10-22 RX ADMIN — SODIUM CHLORIDE, POTASSIUM CHLORIDE, SODIUM LACTATE AND CALCIUM CHLORIDE: 600; 310; 30; 20 INJECTION, SOLUTION INTRAVENOUS at 09:56

## 2020-10-22 RX ADMIN — SUCCINYLCHOLINE CHLORIDE 160 MG: 20 INJECTION, SOLUTION INTRAMUSCULAR; INTRAVENOUS at 10:09

## 2020-10-22 RX ADMIN — MIDAZOLAM 2 MG: 1 INJECTION INTRAMUSCULAR; INTRAVENOUS at 09:58

## 2020-10-22 RX ADMIN — KETAMINE HYDROCHLORIDE 10 MG: 10 INJECTION, SOLUTION INTRAMUSCULAR; INTRAVENOUS at 10:15

## 2020-10-22 RX ADMIN — FENTANYL CITRATE 25 MCG: 50 INJECTION, SOLUTION INTRAMUSCULAR; INTRAVENOUS at 10:38

## 2020-10-22 RX ADMIN — POTASSIUM CHLORIDE, DEXTROSE MONOHYDRATE AND SODIUM CHLORIDE 50 ML/HR: 150; 5; 450 INJECTION, SOLUTION INTRAVENOUS at 19:02

## 2020-10-22 RX ADMIN — ROCURONIUM BROMIDE 20 MG: 10 SOLUTION INTRAVENOUS at 11:29

## 2020-10-22 RX ADMIN — SODIUM CHLORIDE, POTASSIUM CHLORIDE, SODIUM LACTATE AND CALCIUM CHLORIDE: 600; 310; 30; 20 INJECTION, SOLUTION INTRAVENOUS at 12:47

## 2020-10-22 RX ADMIN — CEFAZOLIN SODIUM 1 G: 1 INJECTION, POWDER, FOR SOLUTION INTRAMUSCULAR; INTRAVENOUS at 22:16

## 2020-10-22 RX ADMIN — Medication 2 G: at 14:10

## 2020-10-22 RX ADMIN — Medication 10 MG: at 12:46

## 2020-10-22 RX ADMIN — HYDROMORPHONE HYDROCHLORIDE 0.5 MG: 2 INJECTION, SOLUTION INTRAMUSCULAR; INTRAVENOUS; SUBCUTANEOUS at 11:11

## 2020-10-22 RX ADMIN — PROPOFOL 150 MG: 10 INJECTION, EMULSION INTRAVENOUS at 10:08

## 2020-10-22 RX ADMIN — ROCURONIUM BROMIDE 10 MG: 10 SOLUTION INTRAVENOUS at 13:23

## 2020-10-22 RX ADMIN — Medication 10 MG: at 14:00

## 2020-10-22 RX ADMIN — PHENYLEPHRINE HYDROCHLORIDE 80 MCG: 10 INJECTION INTRAVENOUS at 12:45

## 2020-10-22 RX ADMIN — ROCURONIUM BROMIDE 20 MG: 10 SOLUTION INTRAVENOUS at 12:37

## 2020-10-22 NOTE — ANESTHESIA PREPROCEDURE EVALUATION
Anesthetic History   No history of anesthetic complications            Review of Systems / Medical History  Patient summary reviewed, nursing notes reviewed and pertinent labs reviewed    Pulmonary  Within defined limits                 Neuro/Psych             Comments: Retinal tear of left eye (H33.312)  Cardiovascular    Hypertension        Dysrhythmias : PVC  Hyperlipidemia    Exercise tolerance: >4 METS     GI/Hepatic/Renal     GERD: well controlled          Comments: Diverticulitis (K57.92)  Endo/Other        Morbid obesity and cancer     Other Findings   Comments: Breast neoplasm, Tis (LCIS)   History of ductal carcinoma in situ (DCIS) of breast   Use of tamoxifen (Nolvadex)              Physical Exam    Airway  Mallampati: III  TM Distance: > 6 cm  Neck ROM: normal range of motion   Mouth opening: Normal     Cardiovascular  Regular rate and rhythm,  S1 and S2 normal,  no murmur, click, rub, or gallop  Rhythm: regular  Rate: normal         Dental    Dentition: Upper dentition intact and Lower dentition intact     Pulmonary  Breath sounds clear to auscultation               Abdominal  GI exam deferred       Other Findings            Anesthetic Plan    ASA: 3  Anesthesia type: general          Induction: Intravenous  Anesthetic plan and risks discussed with: Patient

## 2020-10-22 NOTE — BRIEF OP NOTE
Brief Postoperative Note    Patient: Michael Pierson  YOB: 1955  MRN: [id number]    Date of Procedure: 10/22/2020     Pre-Op Diagnosis: LEFT BREAST DCIS, S/P BILATERAL SKIN SPARING MASTECTOMY    Post-Op Diagnosis:   SAME    Procedure(s):  BILATERAL SKIN SPARING MASTECTOMY, LEFT BREAST SENTINEL NODE BIOPSY (800a)AND RECONSTRUCTION  SENTINEL NODE BIOPSY  BILATERAL BREAST RECONSTRUCTION WITH DIRECT TO IMPLANT AND ALLODERM, POSSIBLE BILATERAL BREAST RECONSTRUCTION WITH TISSUE EXPANDERS AND ALLODERM    Surgeon(s):  MD Calvin Castro MD    Surgical Assistant: None    Anesthesia: General     Estimated Blood Loss (mL): minimal    Complications: None    Specimens:   ID Type Source Tests Collected by Time Destination   1 : RIGHT BREAST TISSUE, STITCH SUPERIOR Fresh Breast  Kate Bui MD 10/22/2020 1037 Pathology   2 : LEFT BREAST TISSUE STITCH SUPERIOR Fresh Breast  Kate Bui MD 10/22/2020 1111 Pathology   3 : LEFT AXILLARY SENTINEL NODE #1 Fresh Lymph Node  Kate Bui MD 10/22/2020 1140 Pathology   4 : LEFT AXILLARY SENTINEL NODE #2 Fresh Lymph Node  Kate Bui MD 10/22/2020 1141 Pathology        Implants:   Implant Name Type Inv. Item Serial No.  Lot No. LRB No. Used Action   GRAFT HUM TISS [id number] THK0.4-1. 6MM REGEN TISS MTRX PERF - [id number]  GRAFT HUM TISS [id number] THK0.4-1. 6MM REGEN TISS MTRX PERF [id number] [address] [place] [address] [id number] Right 1 Implanted   IMPLANT BRST 545CC P6.2CM [id number] COHESIVE SELENE GEL SMOOTH [[id number]] [[employer]] - [id number]  IMPLANT BRST 545CC P6.2CM [id number] COHESIVE SELENE GEL SMOOTH [[id number]] [[employer]] [id number] [employer] [facility] [id number] Right 1 Implanted   GRAFT HUM TISS [id number] THK0.4-1. 6MM REGEN TISS MTRX PERF - [id number]  GRAFT HUM TISS [id number] THK0.4-1. 6MM REGEN TISS MTRX PERF [id number] [employer] [facility]_ [id number] Left 1 Implanted   IMPLANT BRST 545CC P6.2CM [id number] COHESIVE SELENE GEL SMOOTH [[id number]] [facility] [employer]] - [id number]  IMPLANT BRST 545CC P6.2CM [id number] COHESIVE SELENE GEL SMOOTH [[id number]] [facility] [employer]] [id number] [employer] [facility]_ [id number] Left 1 Implanted       Drains:   Prem-Sorensen Drain 10/22/20 Right Breast (Active)       Findings: None    Electronically Signed by Saul Lindquist MD on 10/22/2020 at 2:57 PM

## 2020-10-22 NOTE — PROGRESS NOTES
TRANSFER - IN REPORT:    Verbal report received from 1200 Edd Bryan RN(name) on Mason Byrd  being received from 7 E ambulatory(unit) for routine post - op      Report consisted of patients Situation, Background, Assessment and   Recommendations(SBAR). Information from the following report(s) SBAR, Kardex, Intake/Output, MAR and Recent Results was reviewed with the receiving nurse. Opportunity for questions and clarification was provided. Assessment completed upon patients arrival to unit and care assumed.

## 2020-10-22 NOTE — PERIOP NOTES
TRANSFER - OUT REPORT:    Verbal report given to ariel (name) on Kajal Parisi  being transferred to 311 (unit) for routine post - op       Report consisted of patients Situation, Background, Assessment and   Recommendations(SBAR). Time Pre op antibiotic given:2 grams ancef at 1017 and 1410    Anesthesia Stop time: 9389  Salgado Present on Transfer to floor:yes  Order for Salgado on Chart: yes  Discharge Prescriptions with Chart no     Information from the following report(s) SBAR, OR Summary, Procedure Summary, Intake/Output, MAR, Recent Results and Cardiac Rhythm nsr was reviewed with the receiving nurse. Opportunity for questions and clarification was provided. Is the patient on 02? YES       L/Min 2       Other   Is the patient on a monitor? NO    Is the nurse transporting with the patient? YES    Surgical Waiting Area notified of patient's transfer from PACU?  YES      The following personal items collected during your admission accompanied patient upon transfer:   Dental Appliance: Dental Appliances: None  Vision:    Hearing Aid:    Jewelry:    Clothing:    Other Valuables:    Valuables sent to safe:      Clothing bag x 2 /glasses to room with pt

## 2020-10-22 NOTE — BRIEF OP NOTE
Brief Postoperative Note    Patient: Dayana Perla  YOB: 1955  MRN: [id number]    Date of Procedure: 10/22/2020     Pre-Op Diagnosis: LEFT BREAST DCIS    Post-Op Diagnosis: Same as preoperative diagnosis.       Procedure(s):  BILATERAL SKIN SPARING MASTECTOMY, LEFT BREAST SENTINEL NODE BIOPSY (800a)AND RECONSTRUCTION  SENTINEL NODE BIOPSY  BILATERAL BREAST RECONSTRUCTION WITH DIRECT TO IMPLANT AND ALLODERM, POSSIBLE BILATERAL BREAST RECONSTRUCTION WITH TISSUE EXPANDERS AND ALLODERM    Surgeon(s):  MD Kelechi Robison MD    Surgical Assistant: None    Anesthesia: General     Estimated Blood Loss (mL): Minimal    Complications: None    Specimens:   ID Type Source Tests Collected by Time Destination   1 : RIGHT BREAST TISSUE, STITCH SUPERIOR Fresh Breast  Vitor Angulo MD 10/22/2020 1037 Pathology   2 : LEFT BREAST TISSUE STITCH SUPERIOR Fresh Breast  Vitor Angulo MD 10/22/2020 1111 Pathology   3 : LEFT AXILLARY SENTINEL NODE #1 Fresh Lymph Node  Vitor Angulo MD 10/22/2020 1140 Pathology   4 : LEFT AXILLARY SENTINEL NODE #2 Fresh Lymph Node  Vitor Angulo MD 10/22/2020 1141 Pathology        Implants: * No implants in log *    Drains: * No LDAs found *    Findings: sent nodes x 2, bilateral breasts    Electronically Signed by Lino Baires MD on 43/33/6796 at 12:10 PM

## 2020-10-22 NOTE — H&P
HISTORY OF PRESENT ILLNESS   Sam Medel is a 72 y.o. female. HPI   NEW patient presents for consultation at the request of Dr. Arvin Leija for new diagnosis of LEFT breast DCIS diagnosed after an abnormal mammogram. She is not feeling any breast lumps, has no nipple discharge/retraction or skin change. She has a history of RIGHT breast DCIS in 2010 and is S/P lumpectomy and XRT, and notes an occasionally painful area of scar tissue s/p surgery and XRT. Is here today with her . 07/17/20: LEFT breast biopsies. PATH:   - Site A: DCIS, nuclear grade 3 with comedonecrosis and associated calcifications, ER+(70%). Clinical stage 0.   - Site B: Focal PERINEURAL INVASION (see Comment). Sclerotic intraductal papilloma. Fibrocystic changes with usual ductal hyperplasia, columnar cell change and intraductal microcalcifications. Comment: Regarding the left breast site B core biopsy (specimen #2), there is a single focus of carcinoma present within a peripheral nerve sheath consistent with perineural invasion. No invasion is identified elsewhere within the biopsy specimen. This finding is concerning for the presence of invasive mammary carcinoma that was not sampled. A p63 and Smooth muscle myosin immunostain were performed and were both negative. There is insufficient tumor for biomarker testing. 2010: RIGHT breast DCIS, grade 2, ER+/RI+. Treated with lumpectomy, partial breast radiation (Dr. Nicko Ramirez), and Tamoxifen. FH - Paternal aunt was diagnosed with breast cancer in her late 45s to early 46s.         Past Medical History:   Diagnosis Date    Allergic rhinitis     Arrhythmia     Breast cancer ([address] 2010    right breast    Breast cancer, left ([address] 2020    LEFT breast DCIS    Cancer ([address] 6/2010    right breast DCIS    Diverticulitis     Fracture     right wrist fx and surgery; pt fell     Hypertension     Retinal tear of left eye 2/6/14           Past Surgical History:   Procedure Laterality Date  HX BREAST BIOPSY Bilateral 1970 70's and 2010 benign (surgical)    HX BREAST BIOPSY Left 05/2010    benign mri bx    HX BREAST BIOPSY Right 03/2010    positive stereo    HX BREAST LUMPECTOMY  4/2010    right breast    HX OTHER SURGICAL Left 2/6/14     Retinal tear repair by Dr. Cathryn Jane HX TONSILLECTOMY       Social History           Socioeconomic History    Marital status:      Spouse name: Not on file    Number of children: Not on file    Years of education: Not on file    Highest education level: Not on file   Occupational History    Not on file   Social Needs    Financial resource strain: Not on file    Food insecurity     Worry: Not on file     Inability: Not on file    Transportation needs     Medical: Not on file     Non-medical: Not on file   Tobacco Use    Smoking status: Never Smoker    Smokeless tobacco: Never Used   Substance and Sexual Activity    Alcohol use: No    Drug use: No    Sexual activity: Yes     Partners: Male   Lifestyle    Physical activity     Days per week: Not on file     Minutes per session: Not on file    Stress: Not on file   Relationships    Social connections     Talks on phone: Not on file     Gets together: Not on file     Attends Orthodoxy service: Not on file     Active member of club or organization: Not on file     Attends meetings of clubs or organizations: Not on file     Relationship status: Not on file    Intimate partner violence     Fear of current or ex partner: Not on file     Emotionally abused: Not on file     Physically abused: Not on file     Forced sexual activity: Not on file   Other Topics Concern     Service No    Blood Transfusions No    Caffeine Concern No    Occupational Exposure No    Hobby Hazards No    Sleep Concern No    Stress Concern No    Weight Concern Yes    Special Diet No    Back Care No    Exercise No    Bike Helmet No    Seat Belt Yes    Self-Exams Yes   Social History Narrative    Not on file            Current Outpatient Medications on File Prior to Visit   Medication Sig Dispense Refill    ALPRAZolam (XANAX) 0.25 mg tablet take 1 tablet by mouth once daily if needed for anxiety 30 Tab 0    hydroCHLOROthiazide (HYDRODIURIL) 25 mg tablet TAKE 1 TABLET BY MOUTH EVERY DAY 90 Tab 1    olmesartan (BENICAR) 40 mg tablet Take 1 Tab by mouth daily. 90 Tab 1    acetaminophen (TYLENOL) 325 mg tablet Take 2 Tabs by mouth every six (6) hours as needed for Pain. 30 Tab 0    biotin 2,500 mcg tab Take 2,500 mcg by mouth daily.  triamcinolone (NASACORT AQ) 55 mcg nasal inhaler 2 Sprays by Both Nostrils route daily.  cyanocobalamin (VITAMIN B-12) 1,000 mcg tablet Take 1,000 mcg by mouth daily.  MULTIVITS-MIN/FA/CA CARB/VIT K (ONE-A-DAY WOMEN'S 50+ PO) Take 1 Tab by mouth daily.  loratadine (CLARITIN) 10 mg tablet Take 10 mg by mouth daily. No current facility-administered medications on file prior to visit. No Known Allergies   OB History    No obstetric history on file. Obstetric Comments    Menarche: 15. LMP: age 48. # of Children: 3. Age at Delivery of First Child: 22. Hysterectomy/oophorectomy: NO/NO. Breast Bx: yes. Hx of Breast Feeding: yes. BCP: yes. Hormone therapy: no.        ROS   Constitutional: Negative. HENT: Negative. Eyes: Negative. Respiratory: Negative. Cardiovascular: Negative. Gastrointestinal: Negative. Genitourinary: Negative. Musculoskeletal: Negative. Skin: Negative. Neurological: Negative. Endo/Heme/Allergies: Negative. Psychiatric/Behavioral: Negative. Physical Exam   Exam conducted with a chaperone present. Cardiovascular:   Rate and Rhythm: Normal rate and regular rhythm. Heart sounds: Normal heart sounds. Pulmonary:   Breath sounds: Normal breath sounds.    Chest:   Breasts: Breasts are symmetrical.   Right: Normal. No swelling, bleeding, inverted nipple, mass, nipple discharge, skin change or tenderness. Left: Normal. No swelling, bleeding, inverted nipple, mass, nipple discharge, skin change or tenderness. Lymphadenopathy:   Cervical:   Right cervical: No superficial, deep or posterior cervical adenopathy. Left cervical: No superficial, deep or posterior cervical adenopathy. Upper Body:   Right upper body: No supraclavicular or axillary adenopathy. Left upper body: No supraclavicular or axillary adenopathy. BREAST ULTRASOUND, Pre-op Planning   Indication: Pre-op planning, LEFT breast   Technique: The area was scanned using a high-frequency linear-array near-field transducer   Findings: Bx clips at 4:00 and 12-1:00   Impression: Breast cancer   Disposition: Surgery   ASSESSMENT and PLAN     ICD-10-CM ICD-9-CM    1. Ductal carcinoma in situ (DCIS) of left breast with comedonecrosis  D05.12 233.0    2. History of ductal carcinoma in situ (DCIS) of breast  Z86.000 V13.89    Pt presents for consultation for treatment of LEFT breast DCIS, ER+, clinical stage 0. No palpable mass on exam. US visualizes bx clips at 4:00 and 12-1:00. At RIGHT breast, well healed incision s/p lumpectomy, skin looks good s/p XRT, no evidence of local recurrence. We had a long discussion of options for treatment. A total of 80 minutes were spent face-to-face with the patient, accompanied by her , during this encounter, and over half of that time was spent on counseling and coordination of care. We discussed in depth the pathology results, and the need for surgery. The goals of treatment are to treat the breast, ensure that there is no invasive component, and to reduce risk of recurrence. Discussed treatment options with risks, complications, benefits, and limitations, including lumpectomy with possible XRT, and mastectomy with reconstruction, both having the same cure rate. Pt has considered mastectomy given her history of RIGHT breast DCIS, but is still undecided.  We also discussed option for oncoplastic reduction in conjunction with a plastic surgeon. Discussed possible free nipple graft, which will be determined by plastic surgeon. Discussed RIGHT symmetry operation if pt chooses oncoplastic reduction. Also discussed skin-sparing and skin and nipple-sparing mastectomy and reconstruction options. We also covered risk reduction strategies as well as post-surgical therapies including possible XRT, and hormonal therapy with estrogen blocker. Discussed the use of DCISionRT to help determine need for XRT based on risk of invasive recurrence. Pt would not need XRT or hormonal therapy after a BL mastectomy. Also discussed genetic testing based on personal history. Pt will return for nurse visit for genetic testing. Will refer to plastic surgeon for further consultation, and plan further from there. Reviewed that surgery scheduling will depend on coordination with plastic surgeon. This plan was reviewed with the patient and patient agrees. All questions were answered.

## 2020-10-22 NOTE — OP NOTES
[address]   OPERATIVE REPORT    Name:  mAy Cruz  MR#:  [id number]  :  1955  ACCOUNT #:  [de-identified]  DATE OF SERVICE:  10/22/2020    PREOPERATIVE DIAGNOSIS:  Ductal carcinoma in situ of the left breast, multicentric. POSTOPERATIVE DIAGNOSIS:  Ductal carcinoma in situ of the left breast, multicentric. PROCEDURE PERFORMED:  Bilateral skin-sparing mastectomies with left sentinel lymph node biopsy and immediate reconstruction by Dr. Quan Avalos. SURGEON:  Felix Mcdaniel MD    ASSISTANT:  Bianca Arechiga RN    ANESTHESIA:  General.    COMPLICATIONS:  None. SPECIMENS REMOVED:  Bilateral breasts, left axillary sentinel lymph nodes x2. IMPLANTS:  None. ESTIMATED BLOOD LOSS:  Minimal.    INDICATIONS:  The patient is a 70-year-old female who had a history of ductal carcinoma in situ on the right, treated with lumpectomy and radiation and then developed second primary on the left, which is multicentric and opted for bilateral mastectomies with reconstruction. PROCEDURE:  After lymphoscintigraphy in Radiology, the patient was brought to the operating room. After the satisfactory induction of general endotracheal anesthesia, she was prepped and draped in the usual sterile fashion. Attention was turned to the right side first where a circumareolar incision with a lateral extension was made. It was deepened through the subcutaneous tissue with Bovie cautery. The skin flap was raised superior to the clavicle, medial to the sternum, inferior to the inframammary fold, and lateral to the latissimus dorsi muscle. The breast was removed off the chest wall subfascially maintaining hemostasis with a Bovie cautery. At the lateral border of the pectoralis major muscle, the breast was mobilized and amputated at the level of the axilla. All dissection planes were hemostatic. The specimen was oriented and sent to Pathology.   The wound was packed with a wet laparotomy pad.  Attention was then turned to the left where a circumareolar incision with lateral extension was made. It was deepened through the subcutaneous tissue with Bovie cautery. Skin flaps were raised superior to the clavicle, medial to the sternum, inferior to the inframammary fold, and lateral to the latissimus dorsi muscle. The breast was removed off the chest wall subfascially maintaining hemostasis with the Bovie cautery. At the lateral border of the pectoralis major muscle, the breast was mobilized and amputated at the level of the axilla. Specimens were oriented and sent to Pathology. The axilla was then entered utilizing the Neoprobe. Two sentinel nodes were found in the deep axilla. They were removed and sent for permanent section. All dissection planes were hemostatic. The wound was packed with a wet laparotomy pad. At this point, Dr. Moustapha Rodriguez entered the room to complete the immediate reconstruction.       Silas Vuong MD      VILMA/[id number]/  D:  10/22/2020 12:26  T:  10/22/2020 15:11  JOB #:  [id number]  CC:  MD Jane Plata MD Jossie Daughters, MD Maxene Sil, MD

## 2020-10-22 NOTE — ANESTHESIA POSTPROCEDURE EVALUATION
HPI:    Patient ID: Darrion Mauro is a 76year old male. Lucila Givens is a 28-year-old patient of Dr. Jasen Estrada. She last saw him November 28th, 2018, with ankylosing spondylitis/reactive arthritis. He is HLA-B27 positive.   It seemed to resolve, but then he Procedure(s):  BILATERAL SKIN SPARING MASTECTOMY, LEFT BREAST SENTINEL NODE BIOPSY (800a)AND RECONSTRUCTION  SENTINEL NODE BIOPSY  BILATERAL BREAST RECONSTRUCTION WITH DIRECT TO IMPLANT AND ALLODERM. general    Anesthesia Post Evaluation        Patient location during evaluation: PACU  Patient participation: complete - patient participated  Level of consciousness: awake  Pain management: adequate  Airway patency: patent  Anesthetic complications: no  Cardiovascular status: acceptable  Respiratory status: acceptable  Hydration status: acceptable  Comments: Seen, no complaints   Post anesthesia nausea and vomiting:  none  Final Post Anesthesia Temperature Assessment:  Normothermia (36.0-37.5 degrees C)      INITIAL Post-op Vital signs:   Vitals Value Taken Time   /67 10/22/2020  3:05 PM   Temp 37.2 °C (99 °F) 10/22/2020  3:01 PM   Pulse 91 10/22/2020  3:10 PM   Resp 15 10/22/2020  3:10 PM   SpO2 100 % 10/22/2020  3:10 PM   Vitals shown include unvalidated device data. , Disp: , Rfl:   Misc Natural Products (GLUCOSAMINE CHONDROITIN MSM OR), Take by mouth daily.  Take 2 tabs daily, Disp: , Rfl:   NON FORMULARY, Zeaxanthin take one tab every day. , Disp: , Rfl:   Cholecalciferol (VITAMIN D3) 5000 UNITS Oral Cap, Take 5,000 Neurological: He is alert and oriented to person, place, and time. Skin: No rash noted. Psychiatric: He has a normal mood and affect. ASSESSMENT/PLAN:     1. History of ankylosing spondylitis, reactive arthritis, HLA-B27 positivity.   He has

## 2020-10-23 VITALS
TEMPERATURE: 98 F | OXYGEN SATURATION: 98 % | BODY MASS INDEX: 30.92 KG/M2 | WEIGHT: 197.4 LBS | DIASTOLIC BLOOD PRESSURE: 76 MMHG | HEART RATE: 88 BPM | RESPIRATION RATE: 16 BRPM | SYSTOLIC BLOOD PRESSURE: 115 MMHG

## 2020-10-23 PROCEDURE — 74011250637 HC RX REV CODE- 250/637: Performed by: PLASTIC SURGERY

## 2020-10-23 PROCEDURE — 99218 HC RM OBSERVATION: CPT

## 2020-10-23 PROCEDURE — 74011250636 HC RX REV CODE- 250/636: Performed by: PLASTIC SURGERY

## 2020-10-23 PROCEDURE — 74011000258 HC RX REV CODE- 258: Performed by: PLASTIC SURGERY

## 2020-10-23 RX ORDER — DIAZEPAM 5 MG/1
5 TABLET ORAL
Status: DISCONTINUED | OUTPATIENT
Start: 2020-10-23 | End: 2020-10-23 | Stop reason: HOSPADM

## 2020-10-23 RX ORDER — HYDROMORPHONE HYDROCHLORIDE 2 MG/1
2-4 TABLET ORAL
Qty: 40 TAB | Refills: 0 | Status: SHIPPED | OUTPATIENT
Start: 2020-10-23 | End: 2020-10-27

## 2020-10-23 RX ORDER — DIAZEPAM 5 MG/1
5 TABLET ORAL
Qty: 30 TAB | Refills: 0 | Status: SHIPPED | OUTPATIENT
Start: 2020-10-23 | End: 2021-04-12

## 2020-10-23 RX ORDER — CEPHALEXIN 250 MG/1
500 CAPSULE ORAL 3 TIMES DAILY
Qty: 42 CAP | Refills: 0 | Status: SHIPPED | OUTPATIENT
Start: 2020-10-23 | End: 2020-10-30

## 2020-10-23 RX ADMIN — CEFAZOLIN SODIUM 1 G: 1 INJECTION, POWDER, FOR SOLUTION INTRAMUSCULAR; INTRAVENOUS at 05:35

## 2020-10-23 RX ADMIN — CEFAZOLIN SODIUM 1 G: 1 INJECTION, POWDER, FOR SOLUTION INTRAMUSCULAR; INTRAVENOUS at 12:34

## 2020-10-23 RX ADMIN — DIAZEPAM 5 MG: 5 TABLET ORAL at 10:40

## 2020-10-23 RX ADMIN — HYDROMORPHONE HYDROCHLORIDE 2 MG: 2 TABLET ORAL at 12:33

## 2020-10-23 RX ADMIN — HYDROCHLOROTHIAZIDE 25 MG: 25 TABLET ORAL at 06:07

## 2020-10-23 RX ADMIN — HYDROMORPHONE HYDROCHLORIDE 2 MG: 2 TABLET ORAL at 03:26

## 2020-10-23 RX ADMIN — HYDROMORPHONE HYDROCHLORIDE 2 MG: 2 TABLET ORAL at 06:07

## 2020-10-23 NOTE — PROGRESS NOTES
Problem: Patient Education: Go to Patient Education Activity  Goal: Patient/Family Education  Outcome: Resolved/Met     Problem: Surgical Pathway Day of Surgery  Goal: Activity/Safety  10/23/2020 1436 by Blessing Calderón RN  Outcome: Resolved/Met  10/23/2020 1225 by Blessing Calderón RN  Outcome: Progressing Towards Goal  Goal: Nutrition/Diet  Outcome: Resolved/Met  Goal: Medications  Outcome: Resolved/Met  Goal: Respiratory  Outcome: Resolved/Met  Goal: Treatments/Interventions/Procedures  Outcome: Resolved/Met  Goal: Psychosocial  Outcome: Resolved/Met  Goal: *No signs and symptoms of infection or wound complications  Outcome: Resolved/Met  Goal: *Optimal pain control at patient's stated goal  10/23/2020 1436 by Blessing Calderón RN  Outcome: Resolved/Met  10/23/2020 1225 by Blessing Calderón RN  Outcome: Progressing Towards Goal  Goal: *Adequate urinary output (equal to or greater than 30 milliliters/hour)  Description: Ambulatory Surgery patients voiding without difficulty.   Outcome: Resolved/Met  Goal: *Hemodynamically stable  Outcome: Resolved/Met  Goal: *Tolerating diet  Outcome: Resolved/Met  Goal: *Demonstrates progressive activity  Outcome: Resolved/Met     Problem: Surgical Pathway Post-Op Day 1  Goal: Off Pathway (Use only if patient is Off Pathway)  Outcome: Resolved/Met  Goal: Activity/Safety  Outcome: Resolved/Met  Goal: Diagnostic Test/Procedures  Outcome: Resolved/Met  Goal: Nutrition/Diet  Outcome: Resolved/Met  Goal: Discharge Planning  Outcome: Resolved/Met  Goal: Medications  Outcome: Resolved/Met  Goal: Respiratory  Outcome: Resolved/Met  Goal: Treatments/Interventions/Procedures  Outcome: Resolved/Met  Goal: Psychosocial  Outcome: Resolved/Met  Goal: *No signs and symptoms of infection or wound complications  Outcome: Resolved/Met  Goal: *Optimal pain control at patient's stated goal  Outcome: Resolved/Met  Goal: *Adequate urinary output (equal to or greater than 30 milliliters/hour)  Outcome: Resolved/Met  Goal: *Hemodynamically stable  Outcome: Resolved/Met  Goal: *Tolerating diet  Outcome: Resolved/Met  Goal: *Demonstrates progressive activity  Outcome: Resolved/Met  Goal: *Lungs clear or at baseline  Outcome: Resolved/Met     Problem: Surgical Pathway Post-Op Day 2 through Discharge  Goal: Off Pathway (Use only if patient is Off Pathway)  Outcome: Resolved/Met  Goal: Activity/Safety  Outcome: Resolved/Met  Goal: Nutrition/Diet  Outcome: Resolved/Met  Goal: Discharge Planning  Outcome: Resolved/Met  Goal: Medications  Outcome: Resolved/Met  Goal: Respiratory  Outcome: Resolved/Met  Goal: Treatments/Interventions/Procedures  Outcome: Resolved/Met  Goal: Psychosocial  Outcome: Resolved/Met  Goal: *No signs and symptoms of infection or wound complications  Outcome: Resolved/Met  Goal: *Optimal pain control at patient's stated goal  Outcome: Resolved/Met  Goal: *Adequate urinary output (equal to or greater than 30 milliliters/hour)  Outcome: Resolved/Met  Goal: *Hemodynamically stable  Outcome: Resolved/Met  Goal: *Tolerating diet  Outcome: Resolved/Met  Goal: *Demonstrates progressive activity  Outcome: Resolved/Met  Goal: *Lungs clear or at baseline  Outcome: Resolved/Met     Problem: Surgical Pathway: Discharge Outcomes  Goal: *Hemodynamically stable  Outcome: Resolved/Met  Goal: *Lungs clear or at baseline  Outcome: Resolved/Met  Goal: *Demonstrates independent activity or return to baseline  Outcome: Resolved/Met  Goal: *Optimal pain control at patient's stated goal  Outcome: Resolved/Met  Goal: *Verbalizes understanding and describes prescribed diet  Outcome: Resolved/Met  Goal: *Tolerating diet  Outcome: Resolved/Met  Goal: *Verbalizes name, dosage, time, side effects, and number of days to continue medications  Outcome: Resolved/Met  Goal: *No signs and symptoms of infection or wound complications  Outcome: Resolved/Met  Goal: *Anxiety reduced or absent  Outcome: Resolved/Met  Goal: *Understands and describes signs and symptoms to report to providers(Stroke Metric)  Outcome: Resolved/Met  Goal: *Describes follow-up/return visits to physicians  Outcome: Resolved/Met  Goal: *Describes available resources and support systems  Outcome: Resolved/Met     Problem: Falls - Risk of  Goal: *Absence of Falls  Description: Document Denise Fall Risk and appropriate interventions in the flowsheet.   Outcome: Resolved/Met     Problem: Patient Education: Go to Patient Education Activity  Goal: Patient/Family Education  Outcome: Resolved/Met

## 2020-10-23 NOTE — PROGRESS NOTES
Ronel Duffy reports \"hurting all over. \" \"I have been in the same position since surgery. \" She is tolerating PO pain medication and clear liquids. VSS  AF  VILMA serosanguinous bilateral, no hematoma or infection, skin flaps pink and viable    A/P: POD 1 s/p bilateral breast reconstruction with breast implants and Alloderm, stable  1. Encourage OOB and ambulating  2. D/c [place]  3.  Discharge planning today or tomorrow  4. VILMA instructions

## 2020-10-23 NOTE — PROGRESS NOTES
Bedside and Verbal shift change report given to Ivette Uribe RN (oncoming nurse) by IDRIS Rodriguez RN/ [address] [place] [address] (offgoing nurse). Report included the following information SBAR, Kardex, Intake/Output, MAR and Recent Results.

## 2020-10-23 NOTE — PROGRESS NOTES
Music Therapy Assessment  [address] [id number]     1955  72 y.o.  female    Patient Telephone Number: [phone] (home)   [place] Affiliation: Janet Granda   Language: English   Patient Active Problem List    Diagnosis Date Noted    Breast cancer ([facility] 10/22/2020    Ductal carcinoma in situ (DCIS) of left breast with comedonecrosis 07/17/2020    Traumatic subarachnoid bleed with LOC of 30 minutes or less ([facility] 05/26/2020    Overweight 04/25/2018    Malignant neoplasm of upper-inner quadrant of right female breast ([facility] 10/16/2017    Prediabetes 10/05/2016    Sleep disturbance 01/29/2015    Breast neoplasm, Tis (LCIS) 06/05/2014    History of ductal carcinoma in situ (DCIS) of breast 06/05/2014    Use of tamoxifen (Nolvadex) 06/05/2014    S/P breast lumpectomy 06/05/2013    GERD (gastroesophageal reflux disease) 03/19/2013    Essential hypertension 11/19/2010    Lobular carcinoma in situ (LCIS) of left breast 05/11/2010    Allergic rhinitis 03/31/2010    Hyperlipidemia with target LDL less than 130 03/31/2010    Family history of osteoporosis 03/31/2010        Date: 10/23/2020            Total Time (in minutes): 5          [facility] [place] [facility]    Mental Status:   [x] Alert [  ] Sherel Ogren [  ]  Confused  [  ] Minimally responsive  [  ] Sleeping    Communication Status: [  ] Impaired Speech [  ] Nonverbal -N/A    Physical Status:   [  ] Oxygen in use  [  ] Hard of Hearing [  ] Vision Impaired  [  ] Ambulatory  [  ] Ambulatory with assistance [  ] Non-ambulatory -N/A    Music Preferences, Background: Classical, Showtunes, and Jazz. Clinical Problem to be addressed: support healthy pt/family coping and alleviate pain. Goal(s) met in session:  Physical/Pain management (Scale of 1-10):    Pre-session rating: Pt reported that her pain was well managed by her medication at this time. Post-session rating: N/A: Please see Session Observations below.   [  ] Increased relaxation   [  ] Affected breathing patterns  [  ] Decreased muscle tension   [  ] Decreased agitation  [  ] Affected heart rate    [  ] Increased alertness     Emotional/Psychological:  [  ] Increased self-expression   [  ] Decreased aggressive behavior   [  ] Decreased feelings of stress  [  ] Discussed healthy coping skills     [  ] Improved mood    [  ] Decreased withdrawn behavior     Social:  [  ] Decreased feelings of isolation/loneliness [x] Positive social interaction   [  ] Provided support and/or comfort for family/friends    Spiritual:  [  ] Spiritual support    [  ] Expressed peace  [  ] Expressed sita    [  ] Discussed beliefs    Techniques Utilized (Check all that apply):   [  ] Procedural support MT [  ] Music for relaxation [  ] Patient preferred music  [  ] Ligia analysis  [  ] Jimbo Border choice  [  ] Music for validation  [  ] Entrainment  [  ] Movement to music [  ] Guided visualization  [  ] Charlette Denis  [  ] Patient instrument playing [  ] Jimbo Border writing  [  ] Liz Marroquin along   [  ] Ether Gertrudis  [  ] Sensory stimulation  [  ] Active Listening  [  ] Music for spiritual support [  ] Making of CDs as gifts    Session Observations:  Referral from Starr Mercado, Patient Advocate at the [facility]. Patient (pt) was alert, sitting up in her chair. Her spouse Claudeen Angles) was standing beside the pt. This music therapy intern and music therapist (MT Team) asked how the pt was feeling and the pt responded to this. Pt shared about her discharge and looking forward to returning home. MT Team provided words of validation. MT Team asked what the pt's favorite kinds of music are and she shared these. MT Team offered a music therapy session, but the pt declined since she was about to be discharged. MT Team expressed understanding. Will follow as able.      Sera Pitts, Music Therapy Intern  And   SARAY AlmeidaBC (Music Therapist-Board Certified) [place] [facility] Dept.    Referral-based service

## 2020-10-23 NOTE — PROGRESS NOTES
Problem: Surgical Pathway Day of Surgery  Goal: Activity/Safety  Outcome: Progressing Towards Goal  Goal: *Optimal pain control at patient's stated goal  Outcome: Progressing Towards Goal

## 2020-10-23 NOTE — PROGRESS NOTES
Bedside shift change report given to Tung Ramos (oncoming nurse) by Sidra Butler (offgoing nurse). Report included the following information SBAR.     0600- patient complained of breakthrough pain at 5:35. On call MD called and advised nurse to give patient one time dose of 2mg diluadid. Dose given with desired results.

## 2020-10-23 NOTE — DISCHARGE INSTRUCTIONS
Diaz Leiva MD, [facility]  [phone]  BREAST RECONSTRUCTION POST-OPERATIVE INSTRUCTIONS      SURGICAL BRA:  You will be in a surgical bra following the procedure. This bra, or another soft, front-fastening sports bra (with no underwire) should be worn at all times except when showering for the first two weeks. If the surgical bra is irritating to your skin, you may place gauze pads between your skin and the bra for added comfort. You may wash the surgical bra if it gets soiled, but allow it to air-dry, do not place it in the dryer. SURGICAL DRAINS:  Please refer to the VILMA Drain Instructions below for details. ACTIVITY:  Take it easy for the first several days. No cleaning, housework, or strenuous activity. Do not lift anything over 10 pounds, including children, and do not lift your hands over your head. You may resume non-vigorous activities at two weeks, then gently increase your activity as tolerated. No running, weight lifting, cross fit, or other strenuous activities until four weeks. BATHING:  You may shower two days after surgery. NO tub baths, hot tubs, or swimming for two weeks after removal of all VILMA drains. Remove any gauze or other bandages before showering. Your incisions will usually be covered with skin glue. This can get wet in the shower. Simply pat everything dry after the shower. MEDICATION:  You will receive prescriptions for an antibiotic, a narcotic pain medication (usually dilaudid, generic name is hydromorphone). Take the antibiotic until it is finished. Take the narcotic pain medication as needed according to the directions. Do not drive while taking narcotic pain medication. Avoid aspirin for two weeks after surgery. You may take ibuprofen (e.g. Advil)  and/or acetaminophen (Tylenol) according to the package instructions in addition to, or instead of, the prescription pain medicine.     THINGS TO WATCH FOR:  If you experience excessive or sudden swelling, spreading or increasing redness, increasing pain, foul-smelling drainage, separation of any incisions, fever, shaking chills, or any other concerns, please call the office immediately ([phone]). FOLLOW-UP APPOINTMENT: please call the office at [phone] during regular business hours to schedule an appointment on 1 week. APPOINTMENT LOCATION:     [***] [facility]    [address], [address] [facility], [address]     [***] [place] [facility] [address] [facility]    [address] [facility], [address] [facility]      VILMA DRAIN INSTRUCTIONS    PURPOSE:  You have had surgery during which a Prem-Sorensen drain, or VILMA drain, has been placed by your surgeon. A VILMA drain is a rubber tube which goes under the skin and drains excess fluid during the healing process so that this fluid does not accumulate. There is a one-way valve which allows the fluid to collect in the bulb on the end of the drain. The drain is usually held in place by a single stitch. The color of the drainage can range from reddish to pink to straw-colored. CARE OF THE DRAIN:  Caring for the VILMA drain is fairly easy. First, protect the drain so that it does not get pulled inadvertently. You may fasten them to your clothing with a safety pin through the floppy tag on the bulb. DO NOT place a pin through either the drain tubing or the bulb itself. The drain works by maintaining a constant low pressure of suction when the bulb is in the collapsed (squeezed in) position. If the bulb will not maintain this collapsed position when it is recapped, please call the office, as the drain may not be working properly. MEASURING THE DRAINAGE:  Grasp the bulb in one hand and remove the cap with the other hand. This will cause the bulb to relax into a round shape.   Holding the open end over a specimen cup, squirt the fluid into the cup by squeezing the bulb. Once the bulb is empty, squeeze it in (collapse it) with one hand, and replace the cap with your other hand. Then holding the measuring cup level, note how much fluid there is (in milliliters, mL), and record this number on the chart below. Discard the fluid in the toilet and rinse out the specimen cup. Note: your specimen cup may be in cubic centimeters (cc). 1 cc = 1 mL. REMOVAL:  The VILMA drain will be removed in the office when the daily drainage is at a low enough level. You may be asked to call the office with your measuring totals to determine if the drain(s) is (are) ready to be removed. BRING THE CHART BELOW WITH YOU TO YOUR OFFICE VISIT. Removal involves minimal discomfort without the need for anesthesia. The drain site in the skin heals on its own once the drain is removed without any further stitches. Just use a bandaid or gauze over the site until it is dry. The site may get wet in the shower. SHOWERING:  You may shower after you are discharged home while you have one or more VILMA drains in. Just let the water run over the drain sites and then pat them dry when you are done. Some patients like to place a long string necklace around their necks during showers, to which they can safety pin the tag on the bulb to prevent it from dangling. DO NOT take a tub bath, go swimming (pool or lake/ocean), or otherwise submerge your body in water before all VILMA drains have been removed and you are permitted by your surgeon.     THINGS TO WATCH FOR:  Please call the office immediately ([phone]) if you notice:   Sudden bright or dark red bleeding into the bulb or around the drain site in the skin   Dislodgment of the VILMA drain or if the drain falls out   Spreading redness around the drain site in the skin   Cloudy or foul-smelling drainage in the bulb or around the drain site   Fever, shaking chills, excessive swelling, pain or discomfort   Any other concerns or questions        Date           #1 (AM)             #1 (PM)             Daily Total #1  (AM+PM)             #2 (AM)           #2 (PM)           Daily  Total  #2  (AM+PM)

## 2020-10-25 NOTE — OP NOTES
[address]   OPERATIVE REPORT    Name:  Florian Gastelum  MR#:  [id number]  :  1955  ACCOUNT #:  [de-identified]  DATE OF SERVICE:  10/22/2020      PREOPERATIVE DIAGNOSES:  1. Left breast cancer. 2.  Status post bilateral skin-sparing mastectomy. POSTOPERATIVE DIAGNOSES:  1. Left breast cancer. 2.  Status post bilateral skin-sparing mastectomy. PROCEDURE PERFORMED:  Bilateral reconstruction in the immediate setting using permanent gel implants and AlloDerm. SURGEON:  Sima Jacobs MD    ASSISTANT:  Domenico Sales RN    ANESTHESIA:  General.    COMPLICATIONS:  None. SPECIMENS REMOVED:  None. IMPLANTS:  On the right is AlloDerm Select tissue matrix ready to use, lot number [id number], expiration 2022, reference number [id number]. This is a 16 x 20 cm perforated case of medium thickness. On the left side, same product, lot number [id number], expiration 2022, reference number [id number]. The implants placed on the right is Natrelle Inspira breast implant, style SRX, holding 545 mL, reference number TAJ-370, SN number [id number]. On the left, same implant, reference number DQV-788, SN number [id number]. ESTIMATED BLOOD LOSS:  Minimal    DRAINS:  #15 round Prem-Sorensen drain in each breast for a total of 2 drains. INDICATIONS FOR PROCEDURE:  The patient Is a 40-year-old woman who has left-sided DCIS. She has opted for bilateral skin-sparing mastectomy with Dr. Francesca Worthy. Additionally, she has opted for bilateral immediate breast reconstruction using permanent implants and AlloDerm. After the patient signed informed consent, she was marked in the preoperative holding area and taken to the operating room, placed on the operating room table in supine position. She was placed under general endotracheal anesthesia without complication. Dr. Cassy Valencia performed his portion of the procedure.   For details of his surgery, please see his dictation from this date.    PROCEDURE:  The patient was then re-prepped and re-draped in a sterile surgical fashion using Betadine paint. A time-out was performed in order to verify the patient's identity and surgical sites which were both correct. I began on the right side where I elevated the pectoralis major muscle off the underlying attachments to the chest wall. Additionally, the AlloDerm was prepared on the back table by soaking it in sterile saline for a minimum of 3 minutes. It was tailored and then inset at the inframammary fold along the chest wall using interrupted 3-0 PDS suture. A temporary breast implant sizer was utilized in order to guide the tailoring of the AlloDerm. 545 mL implant was chosen because the patient wanted it to be slightly smaller and this accommodating the patient's body habitus and was in proportion to the rest of her body. After the AlloDerm was tailored around this temporary sizer, the patient was temporarily closed using surgical staples and placed in the upright and sitting position to evaluate for size and symmetry. It appeared that the skin and soft tissue needed to be tailored more on the right than on the left and this was a larger breast.  Preoperatively, the right breast weighed 751 g and the left breast weighed 665 g. After this was performed, the temporary sizer was removed. The patient was placed in the supine position. Temporary staples were removed. The operative site was irrigated copiously using vancomycin, Ancef, and gentamicin irrigation. Excellent hemostasis was achieved using electrocautery. A #15 round Prem-Sorensen drain was placed at the base of the operated breast exiting the lateral chest wall. This was secured to the chest wall skin using interrupted 3-0 PDS suture. At this point, Exparel diluted with Marcaine was injected throughout the breast pocket, approximately 20 mL of this mixture was injected into each breast pocket.   At this point, the permanent implant was prepared on the back table and it was placed into the subpectoral and sub-AlloDerm space using a Segundo funnel after new gloves were donned. This interface was then closed using interrupted horizontal mattress 3-0 PDS suture. The deep dermis was then closed using an interrupted 3-0 Monocryl and the skin was closed using running subcuticular 4-0 Monocryl. The dressing consisted of bacitracin ointment, Xeroform, 4 x 4's, ABD, and a surgical bra. The patient was extubated and transferred to the PACU in stable condition. There were no complications during the procedure. The patient tolerated the procedure without complication. The instrument, sponge, and needle count was correct at the conclusion of the case.         Benji Tanner MD [id number]  D:  10/24/2020 17:13  T:  10/24/2020 22:27  JOB #:  [id number]

## 2020-10-26 ENCOUNTER — PATIENT OUTREACH (OUTPATIENT)
Dept: CASE MANAGEMENT | Age: 65
End: 2020-10-26

## 2020-10-26 ENCOUNTER — TELEPHONE (OUTPATIENT)
Dept: SURGERY | Age: 65
End: 2020-10-26

## 2020-10-26 NOTE — PROGRESS NOTES
Patient contacted regarding COVID-19  risk. Discussed COVID-19 related testing which was available at this time. Test results were negative. Patient informed of results, if available? yes. Outreach made within 2 business days of discharge: Yes    Care Transition Nurse/ Ambulatory Care Manager/ LPN Care Coordinator contacted the patient by telephone to perform post discharge assessment. Verified name and  with patient as identifiers. Provided introduction to self, and explanation of the CTN/ACM/LPN role, and reason for call due to risk factors for infection and/or exposure to COVID-19. Symptoms reviewed with patient who verbalized the following symptoms: no new symptoms. Due to no new or worsening symptoms encounter was not routed to provider for escalation. Discussed follow-up appointments. If no appointment was previously scheduled, appointment scheduling offered: [facility] follow up appointment(s):   Future Appointments   Date Time Provider Robel Cardona    43:67 AM Tiffani Figueredo., MD [facility]     Non-[facility] follow up appointment(s): none      Advance Care Planning:   Does patient have an Advance Directive: currently not on file; patient declined education    Patient has following risk factors of: cancer. CTN/ACM/LPN reviewed discharge instructions, medical action plan and red flags such as increased shortness of breath, increasing fever and signs of decompensation with patient who verbalized understanding. Discussed exposure protocols and quarantine with CDC Guidelines What to do if you are sick with coronavirus disease .  Patient was given an opportunity for questions and concerns. The patient agrees to contact the [facility] exposure line [phone], local [facility] department [address]  ([phone]) and PCP office for questions related to their healthcare. CTN/ACM provided contact information for future needs.     Reviewed and educated patient on any new and changed medications related to discharge diagnosis. Patient/family/caregiver given information for [employer] and agrees to enroll no  14 day call based on severity of symptoms and risk factors.

## 2020-11-06 ENCOUNTER — PATIENT OUTREACH (OUTPATIENT)
Dept: CASE MANAGEMENT | Age: 65
End: 2020-11-06

## 2020-11-06 NOTE — PROGRESS NOTES
Patient resolved from Transition of Care episode on 10/23  Patient/family has been provided the following resources and education related to COVID-19:                         Signs, symptoms and red flags related to COVID-19            CDC exposure and quarantine guidelines            [facility] exposure contact - [phone]            Contact for their local Department of Health                 Patient currently reports that the following symptoms have improved:  has no symptoms     No further outreach scheduled with this CTN/ACM. Episode of Care resolved. Patient has this CTN/ACM contact information if future needs arise.

## 2020-11-25 ENCOUNTER — OFFICE VISIT (OUTPATIENT)
Dept: SURGERY | Age: 65
End: 2020-11-25
Payer: COMMERCIAL

## 2020-11-25 VITALS — WEIGHT: 197 LBS | HEIGHT: 67 IN | BODY MASS INDEX: 30.92 KG/M2 | TEMPERATURE: 98.2 F

## 2020-11-25 DIAGNOSIS — Z86.000 HISTORY OF DUCTAL CARCINOMA IN SITU (DCIS) OF BREAST: ICD-10-CM

## 2020-11-25 DIAGNOSIS — D05.11 DUCTAL CARCINOMA IN SITU (DCIS) OF BOTH BREASTS: Primary | ICD-10-CM

## 2020-11-25 DIAGNOSIS — D05.12 DUCTAL CARCINOMA IN SITU (DCIS) OF BOTH BREASTS: Primary | ICD-10-CM

## 2020-11-25 PROCEDURE — 99024 POSTOP FOLLOW-UP VISIT: CPT | Performed by: SURGERY

## 2020-11-25 NOTE — PROGRESS NOTES
HISTORY OF PRESENT ILLNESS  Dayana Perla is a 72 y.o. female. HPI   ESTABLISHED patient here for surgical follow-up. She is about one month S/P BILATERAL mastectomy with reconstruction by Dr. Rowan Burr. Saw her last week, and she is very happy with the results. She is doing pretty well. Did have some significant pain in the LEFT breast last night requiring pain medication, but otherwise is using only Tylenol or Advil. FH - Paternal aunt was diagnosed with breast cancer in her late 45s to early 46s.   Her daughter (pt's cousin) also had breast cancer. 2010: RIGHT breast DCIS, grade 2, ER+/NC+. Treated with lumpectomy, partial breast radiation (Dr. Emely Ortiz), and Tamoxifen. 07/17/20: LEFT breast biopsies. PATH:  - Site A: DCIS, nuclear grade 3 with comedonecrosis and associated calcifications, ER+(70%). Clinical stage 0.  - Site B: Focal PERINEURAL INVASION (see Comment). Sclerotic intraductal papilloma. Fibrocystic changes with usual ductal hyperplasia, columnar cell change and intraductal microcalcifications. Comment: Regarding the left breast site B core biopsy (specimen #2), there is a single focus of carcinoma present within a peripheral nerve sheath consistent with perineural invasion. No invasion is identified elsewhere within the biopsy specimen. This finding is concerning for the presence of invasive mammary carcinoma that was not sampled. A p63 and Smooth muscle myosin immunostain were performed and were both negative. There is insufficient tumor for biomarker testing. 10/22/20: BL skin-sparing mastectomies, with LEFT SLNBx, and immediate reconstruction by Dr. Nivia Trinidad. PATH:  - RIGHT: Single focus of DCIS, UIQ, 2mm, nuclear grade I, negative margins. Pathologic stage: pTis, pNX.  - LEFT: 2 foci of residual DCIS, each less than 2mm, nuclear grade III, negative margins, 0/5 LNs involved. Pathologic stage: pTis, pN0.  ALH, intraductal papilloma, sclerosing adenosis, and florid usual ductal hyperplasia also noted. Past Medical History:   Diagnosis Date    Allergic rhinitis     Arrhythmia     occ pvc''s    Breast cancer ([address] 2010    right breast    Breast cancer, left ([address] 2020    LEFT breast DCIS    [address] [address] 6/2010    right breast DCIS    Diverticulitis     Fracture     right wrist fx and surgery; pt fell     GERD (gastroesophageal reflux disease)     Hemorrhage, subarachnoid, traumatic ([address] 05/2020    Hypertension     Retinal tear of left eye 2/6/14       Past Surgical History:   Procedure Laterality Date    HX BREAST BIOPSY Bilateral 1970 70's and 2010 benign (surgical)    HX BREAST BIOPSY Left 05/2010    benign mri bx    HX BREAST BIOPSY Right 03/2010    positive stereo    HX BREAST BIOPSY Left 07/2020    HX BREAST LUMPECTOMY  4/2010    right breast    HX BREAST RECONSTRUCTION Bilateral 10/22/2020    BILATERAL BREAST RECONSTRUCTION WITH DIRECT TO IMPLANT AND ALLODERM performed by Cliffton Bumpers, MD at [address] HX COLONOSCOPY  2014    HX ORTHOPAEDIC  05/2020    ORIF RIGHT WRIST    HX OTHER SURGICAL Left 2/6/14     Retinal tear repair by Dr. Chantal Suarez HX TONSILLECTOMY      HX WISDOM TEETH EXTRACTION         Social History     Socioeconomic History    Marital status:      Spouse name: Not on file    Number of children: Not on file    Years of education: Not on file    Highest education level: Not on file   Occupational History    Not on file   Social Needs    Financial resource strain: Not on file    Food insecurity     Worry: Not on file     Inability: Not on file    Transportation needs     Medical: Not on file     Non-medical: Not on file   Tobacco Use    Smoking status: Never Smoker    Smokeless tobacco: Never Used   Substance and Sexual Activity    Alcohol use:  Yes     Alcohol/week: 3.0 standard drinks     Types: 3 Glasses of wine per week     Comment: 3/WK    Drug use: No    Sexual activity: Yes     Partners: Male   Lifestyle    Physical activity     Days per week: Not on file     Minutes per session: Not on file    Stress: Not on file   Relationships    Social connections     Talks on phone: Not on file     Gets together: Not on file     Attends Congregation service: Not on file     Active member of club or organization: Not on file     Attends meetings of clubs or organizations: Not on file     Relationship status: Not on file    Intimate partner violence     Fear of current or ex partner: Not on file     Emotionally abused: Not on file     Physically abused: Not on file     Forced sexual activity: Not on file   Other Topics Concern     Service No    Blood Transfusions No    Caffeine Concern No    Occupational Exposure No    Hobby Hazards No    Sleep Concern No    Stress Concern No    Weight Concern Yes    Special Diet No    Back Care No    Exercise No    Bike Helmet No    Seat Belt Yes    Self-Exams Yes   Social History Narrative    Not on file       Current Outpatient Medications on File Prior to Visit   Medication Sig Dispense Refill    diazePAM (VALIUM) 5 mg tablet Take 1 Tab by mouth every six (6) hours as needed for Muscle Spasm(s). Max Daily Amount: 20 mg. 30 Tab 0    BIOTIN PO Take 1,000 mcg by mouth daily.  omeprazole (PRILOSEC) 20 mg capsule Take 20 mg by mouth every morning.  hydroCHLOROthiazide (HYDRODIURIL) 25 mg tablet TAKE 1 TABLET BY MOUTH EVERY DAY (Patient taking differently: Take 25 mg by mouth every morning. TAKE 1 TABLET BY MOUTH EVERY DAY) 90 Tab 1    olmesartan (BENICAR) 40 mg tablet Take 1 Tab by mouth daily. (Patient taking differently: Take 40 mg by mouth nightly.) 90 Tab 1    acetaminophen (TYLENOL) 325 mg tablet Take 2 Tabs by mouth every six (6) hours as needed for Pain. 30 Tab 0    triamcinolone (NASACORT AQ) 55 mcg nasal inhaler 2 Sprays by Both Nostrils route nightly.       cyanocobalamin (VITAMIN B-12) 1,000 mcg tablet Take 1,000 mcg by mouth daily.  MULTIVITS-MIN/FA/CA CARB/VIT K (ONE-A-DAY WOMEN'S 50+ PO) Take 1 Tab by mouth daily.  loratadine (CLARITIN) 10 mg tablet Take 10 mg by mouth daily.  [DISCONTINUED] anastrozole (Arimidex) 1 mg tablet Take 1 mg by mouth daily. Indications: hormone receptor positive breast cancer 30 Tab 5     No current facility-administered medications on file prior to visit. Allergies   Allergen Reactions    Adhesive Rash       OB History    No obstetric history on file. Obstetric Comments   Menarche:  15. LMP: age 48. # of Children:  3. Age at Delivery of First Child:  22.   Hysterectomy/oophorectomy:  NO/NO. Breast Bx:  yes. Hx of Breast Feeding:  yes. BCP:  yes. Hormone therapy:  no.             ROS    Physical Exam  Exam conducted with a chaperone present. Cardiovascular:      Rate and Rhythm: Normal rate and regular rhythm. Heart sounds: Normal heart sounds. Pulmonary:      Breath sounds: Normal breath sounds. Chest:      Breasts: Breasts are symmetrical.         Right: Normal. No swelling, bleeding, inverted nipple, mass, nipple discharge, skin change or tenderness. Left: Normal. No swelling, bleeding, inverted nipple, mass, nipple discharge, skin change or tenderness. Lymphadenopathy:      Cervical:      Right cervical: No superficial, deep or posterior cervical adenopathy. Left cervical: No superficial, deep or posterior cervical adenopathy. Upper Body:      Right upper body: No supraclavicular or axillary adenopathy. Left upper body: No supraclavicular or axillary adenopathy. ASSESSMENT and PLAN    ICD-10-CM ICD-9-CM    1. Ductal carcinoma in situ (DCIS) of both breasts  D05.11 233.0     D05.12     2.  History of ductal carcinoma in situ (DCIS) of breast  Z86.000 V13.89       Patient presents for f/u s/p BL skin-sparing mastectomies, LEFT SLNBx, and immediate reconstruction on 10/22/20, and is doing well overall. Well healed incisions s/p BL mastectomies with implant reconstruction, no evidence of recurrence. Telangiectasia noted at RIGHT breast UIQ s/p XRT. Reviewed surgical PATH. Pt states that her insurance company denied genetic testing, despite personal history of BL breast cancer, and family history of breast cancer. Will look into [id number] for this. F/U in 6 months. This plan was reviewed with the patient and patient agrees. All questions were answered.     Written by Tima Ruiz, as dictated by Dr. Livia Story MD.

## 2020-11-25 NOTE — PROGRESS NOTES
HISTORY OF PRESENT ILLNESS Mason Byrd is a 72 y.o. female. HPI   ESTABLISHED patient here for surgical follow-up. She is about one month S/P BILATERAL mastectomy with reconstruction by Dr. Guanaco Umana. Saw her last week, and she is very happy with the results. She is doing pretty well. Did have some significant pain in the LEFT breast last night requiring pain medication, but otherwise is using only Tylenol or Advil.   
07/17/20: LEFT breast biopsies. PATH: 
- Site A: DCIS, nuclear grade 3 with comedonecrosis and associated calcifications, ER+(70%). Clinical stage 0. 
- Site B: Focal PERINEURAL INVASION (see Comment). Sclerotic intraductal papilloma. Fibrocystic changes with usual ductal hyperplasia, columnar cell change and intraductal microcalcifications. Comment: Regarding the left breast site B core biopsy (specimen #2), there is a single focus of carcinoma present within a peripheral nerve sheath consistent with perineural invasion. No invasion is identified elsewhere within the biopsy specimen. This finding is concerning for the presence of invasive mammary carcinoma that was not sampled. A p63 and Smooth muscle myosin immunostain were performed and were both negative. There is insufficient tumor for biomarker testing. 10/22/20: BL skin-sparing mastectomies, with LEFT SLNBx, and immediate reconstruction by Dr. Raiza Rodriguez. PATH: 
- RIGHT: Single focus of DCIS, UIQ, 2mm, nuclear grade I, negative margins. Pathologic stage: pTis, pNX. 
- LEFT: 2 foci of residual DCIS, each less than 2mm, nuclear grade III, negative margins, 0/5 LNs involved. Pathologic stage: pTis, pN0. ALH, intraductal papilloma, sclerosing adenosis, and florid usual ductal hyperplasia also noted. ROS Physical Exam 
 
ASSESSMENT and PLAN 
{ASSESSMENT/PLAN:[id number]}

## 2021-01-12 RX ORDER — OLMESARTAN MEDOXOMIL 40 MG/1
TABLET ORAL
Qty: 90 TAB | Refills: 1 | Status: SHIPPED | OUTPATIENT
Start: 2021-01-12 | End: 2021-04-12

## 2021-02-28 ENCOUNTER — PATIENT MESSAGE (OUTPATIENT)
Dept: FAMILY MEDICINE CLINIC | Age: 66
End: 2021-02-28

## 2021-02-28 DIAGNOSIS — F41.9 ANXIETY: Primary | ICD-10-CM

## 2021-02-28 DIAGNOSIS — D05.12 DUCTAL CARCINOMA IN SITU OF LEFT BREAST: ICD-10-CM

## 2021-03-01 ENCOUNTER — TELEPHONE (OUTPATIENT)
Dept: FAMILY MEDICINE CLINIC | Age: 66
End: 2021-03-01

## 2021-03-01 NOTE — TELEPHONE ENCOUNTER
----- Message from Lv Sebastian sent at 2/28/2021  8:52 PM EST -----  Regarding: Prescription Question  Contact: [phone]  Please send a RX for Xanax. 25mg to [facility]. I occasionally need it for sleep.  Thanks-

## 2021-03-31 RX ORDER — DIAZEPAM 5 MG/1
5 TABLET ORAL
Qty: 30 TAB | Refills: 0 | Status: CANCELLED | OUTPATIENT
Start: 2021-03-31

## 2021-03-31 NOTE — TELEPHONE ENCOUNTER
From: Rebekah Wick  To: Татьяна Mead MD  Sent: 2/28/2021 8:52 PM EST  Subject: Prescription Question    Please send a RX for Xanax. 25mg to [facility]. I occasionally need it for sleep.  Thanks-

## 2021-04-01 RX ORDER — ALPRAZOLAM 0.25 MG/1
0.25 TABLET ORAL
Qty: 30 TAB | Refills: 0 | Status: SHIPPED | OUTPATIENT
Start: 2021-04-01 | End: 2021-04-12 | Stop reason: SDUPTHER

## 2021-04-12 ENCOUNTER — OFFICE VISIT (OUTPATIENT)
Dept: FAMILY MEDICINE CLINIC | Age: 66
End: 2021-04-12
Payer: COMMERCIAL

## 2021-04-12 VITALS
TEMPERATURE: 99.4 F | HEART RATE: 91 BPM | WEIGHT: 198 LBS | OXYGEN SATURATION: 98 % | SYSTOLIC BLOOD PRESSURE: 133 MMHG | BODY MASS INDEX: 31.08 KG/M2 | RESPIRATION RATE: 16 BRPM | HEIGHT: 67 IN | DIASTOLIC BLOOD PRESSURE: 76 MMHG

## 2021-04-12 DIAGNOSIS — C50.911 BILATERAL MALIGNANT NEOPLASM OF BREAST IN FEMALE, ESTROGEN RECEPTOR POSITIVE, UNSPECIFIED SITE OF BREAST (HCC): ICD-10-CM

## 2021-04-12 DIAGNOSIS — I10 ESSENTIAL HYPERTENSION: ICD-10-CM

## 2021-04-12 DIAGNOSIS — R73.03 PREDIABETES: Primary | ICD-10-CM

## 2021-04-12 DIAGNOSIS — F41.9 ANXIETY: ICD-10-CM

## 2021-04-12 DIAGNOSIS — E66.9 OBESITY, CLASS I, BMI 30-34.9: ICD-10-CM

## 2021-04-12 DIAGNOSIS — Z23 ENCOUNTER FOR IMMUNIZATION: ICD-10-CM

## 2021-04-12 DIAGNOSIS — C50.912 BILATERAL MALIGNANT NEOPLASM OF BREAST IN FEMALE, ESTROGEN RECEPTOR POSITIVE, UNSPECIFIED SITE OF BREAST (HCC): ICD-10-CM

## 2021-04-12 DIAGNOSIS — E78.5 HYPERLIPIDEMIA WITH TARGET LDL LESS THAN 130: ICD-10-CM

## 2021-04-12 DIAGNOSIS — Z17.0 BILATERAL MALIGNANT NEOPLASM OF BREAST IN FEMALE, ESTROGEN RECEPTOR POSITIVE, UNSPECIFIED SITE OF BREAST (HCC): ICD-10-CM

## 2021-04-12 LAB — HBA1C MFR BLD HPLC: 6 %

## 2021-04-12 PROCEDURE — 90471 IMMUNIZATION ADMIN: CPT | Performed by: FAMILY MEDICINE

## 2021-04-12 PROCEDURE — 99214 OFFICE O/P EST MOD 30 MIN: CPT | Performed by: FAMILY MEDICINE

## 2021-04-12 PROCEDURE — 83036 HEMOGLOBIN GLYCOSYLATED A1C: CPT | Performed by: FAMILY MEDICINE

## 2021-04-12 PROCEDURE — 90732 PPSV23 VACC 2 YRS+ SUBQ/IM: CPT | Performed by: FAMILY MEDICINE

## 2021-04-12 RX ORDER — ZOSTER VACCINE RECOMBINANT, ADJUVANTED 50 MCG/0.5
0.5 KIT INTRAMUSCULAR ONCE
Qty: 0.5 ML | Refills: 0 | Status: SHIPPED | OUTPATIENT
Start: 2021-04-12 | End: 2021-04-12

## 2021-04-12 RX ORDER — OLMESARTAN MEDOXOMIL 40 MG/1
TABLET ORAL
Qty: 90 TAB | Refills: 0 | Status: SHIPPED | OUTPATIENT
Start: 2021-04-12 | End: 2021-09-24

## 2021-04-12 RX ORDER — MINERAL OIL
ENEMA (ML) RECTAL
COMMUNITY
End: 2021-10-18 | Stop reason: ALTCHOICE

## 2021-04-12 RX ORDER — ALPRAZOLAM 0.25 MG/1
0.25 TABLET ORAL
Qty: 90 TAB | Refills: 0 | Status: SHIPPED | OUTPATIENT
Start: 2021-04-12 | End: 2021-09-24

## 2021-04-12 NOTE — PROGRESS NOTES
Chief Complaint   Patient presents with    Pre-diabetes     1. Have you been to the ER, urgent care clinic since your last visit? Hospitalized since your last visit? 2. Have you seen or consulted any other health care providers outside of the [address] [place] [address] since your last visit? Include any pap smears or colon screening. Breast Surgeon DR. Pham   Breast Re-construction

## 2021-04-12 NOTE — PROGRESS NOTES
HPI  Meredith Negrete 77 y.o. female  presents to the office today for pre-diabetes. Blood pressure 133/76, pulse 91, temperature 99.4 °F (37.4 °C), temperature source Temporal, resp. rate 16, height 5' 7\" (1.702 m), weight 198 lb (89.8 kg), SpO2 98 %. Body mass index is 31.01 kg/m². Chief Complaint   Patient presents with    Pre-diabetes        Prediabetes: A1c per POC today 6.0, little change from A1c of 5.8 on 10/23/2019. Hyperlipidemia: Lipid panel on 5/27/2020 notable for total cholesterol 204, HDL 83, , and triglycerides 60. Pt continues with her current reigmen. Hypertension: BP at office today 133/76. Pt continues with Benicar 40 mg/day and HCTZ 25 mg/day. Vitamin B12 deficiency: Stable. Pt continues with cyanocobalamin. Anxiety/Insomnia: Pt continues with Xanax 0.25 mg/day PRN. The Prescription Monitoring Program has been reviewed for recent activity regarding controlled substances for this patient. Pt requests a refill of their medication, which I have granted. Breast cancer: Pt is under the care of Dr. Oksana Donis for this condition. She reports she has finished her treatment. Obesity: I have reviewed/discussed the above normal BMI with the patient. I have recommended the following interventions: dietary management education, guidance, and counseling, encourage exercise and monitor weight . Health maintenance: Pt will have updated lab work performed during today's OV. Pt will receive her pneumonia vaccination during today's appointment. Current Outpatient Medications   Medication Sig Dispense Refill    olmesartan (BENICAR) 40 mg tablet TAKE 1 TABLET BY MOUTH ONE TIME A DAY 90 Tab 0    fexofenadine (ALLEGRA) 180 mg tablet Take  by mouth.  ALPRAZolam (XANAX) 0.25 mg tablet Take 1 Tab by mouth nightly as needed for Anxiety.  Max Daily Amount: 0.25 mg. 90 Tab 0    varicella-zoster recombinant, PF, (Shingrix, PF,) 50 mcg/0.5 mL susr injection 0.5 mL by IntraMUSCular route once for 1 dose. 0.5 mL 0    BIOTIN PO Take 1,000 mcg by mouth daily.  omeprazole (PRILOSEC) 20 mg capsule Take 20 mg by mouth every morning.  hydroCHLOROthiazide (HYDRODIURIL) 25 mg tablet TAKE 1 TABLET BY MOUTH EVERY DAY (Patient taking differently: Take 25 mg by mouth every morning. TAKE 1 TABLET BY MOUTH EVERY DAY) 90 Tab 1    acetaminophen (TYLENOL) 325 mg tablet Take 2 Tabs by mouth every six (6) hours as needed for Pain. 30 Tab 0    triamcinolone (NASACORT AQ) 55 mcg nasal inhaler 2 Sprays by Both Nostrils route nightly.  cyanocobalamin (VITAMIN B-12) 1,000 mcg tablet Take 1,000 mcg by mouth daily.  MULTIVITS-MIN/FA/CA CARB/VIT K (ONE-A-DAY WOMEN'S 50+ PO) Take 1 Tab by mouth daily.        Allergies   Allergen Reactions    Adhesive Rash     Past Medical History:   Diagnosis Date    Allergic rhinitis     Arrhythmia     occ pvc''s    Breast cancer ([address] 2010    right breast    Breast cancer, left ([address] 2020    LEFT breast DCIS    Cancer ([address] 6/2010    right breast DCIS    Diverticulitis     Fracture     right wrist fx and surgery; pt fell     GERD (gastroesophageal reflux disease)     Hemorrhage, subarachnoid, traumatic ([address] 05/2020    Hypertension     Retinal tear of left eye 2/6/14     Past Surgical History:   Procedure Laterality Date    HX BREAST BIOPSY Bilateral 1970 70's and 2010 benign (surgical)    HX BREAST BIOPSY Left 05/2010    benign mri bx    HX BREAST BIOPSY Right 03/2010    positive stereo    HX BREAST BIOPSY Left 07/2020    HX BREAST LUMPECTOMY  4/2010    right breast    HX BREAST RECONSTRUCTION Bilateral 10/22/2020    BILATERAL BREAST RECONSTRUCTION WITH DIRECT TO IMPLANT AND ALLODERM performed by Balta Ennis MD at [address] HX COLONOSCOPY  2014    HX ORTHOPAEDIC  05/2020    ORIF RIGHT WRIST    HX OTHER SURGICAL Left 2/6/14     Retinal tear repair by Dr. Carmel Ann HX TONSILLECTOMY      HX WISDOM TEETH EXTRACTION       Family History   Problem Relation Age of Onset    Other Mother         overweight, gout    Heart Disease Mother         chf    Hypertension Mother     Glaucoma Father     Hypertension Father     Osteoporosis Paternal Grandmother     Diabetes Paternal Aunt 36        breast cancer    Breast Cancer Paternal Aunt 36    No Known Problems Sister     Heart Disease Brother     Anesth Problems Neg Hx      Social History     Tobacco Use    Smoking status: Never Smoker    Smokeless tobacco: Never Used   Substance Use Topics    Alcohol use: Yes     Alcohol/week: 3.0 standard drinks     Types: 3 Glasses of wine per week     Comment: 3/WK        Review of Systems   Constitutional: Negative for chills and fever. HENT: Negative for hearing loss and tinnitus. Eyes: Negative for blurred vision and double vision. Respiratory: Negative for cough and shortness of breath. Cardiovascular: Negative for chest pain and palpitations. Gastrointestinal: Negative for nausea and vomiting. Genitourinary: Negative for dysuria and frequency. Musculoskeletal: Negative for back pain and falls. Skin: Negative for itching and rash. Neurological: Negative for dizziness, loss of consciousness and headaches. Endo/Heme/Allergies: Negative. Psychiatric/Behavioral: Negative for depression. The patient is not nervous/anxious. Physical Exam  Constitutional:       Appearance: Normal appearance. HENT:      Head: Normocephalic and atraumatic. Right Ear: Tympanic membrane, ear canal and external ear normal.      Left Ear: Tympanic membrane, ear canal and external ear normal.      Nose: Nose normal.      Mouth/Throat:      Mouth: Mucous membranes are moist.      Pharynx: Oropharynx is clear. Eyes:      Extraocular Movements: Extraocular movements intact. Conjunctiva/sclera: Conjunctivae normal.      Pupils: Pupils are equal, round, and reactive to light. Cardiovascular:      Rate and Rhythm: Normal rate and regular rhythm. Pulses: Normal pulses. Heart sounds: Normal heart sounds. Pulmonary:      Effort: Pulmonary effort is normal.      Breath sounds: Normal breath sounds. Abdominal:      General: Abdomen is flat. Bowel sounds are normal.      Palpations: Abdomen is soft. Genitourinary:     General: Normal vulva. Rectum: Normal.   Musculoskeletal: Normal range of motion. Skin:     General: Skin is warm and dry. Neurological:      General: No focal deficit present. Mental Status: She is alert and oriented to person, place, and time. Mental status is at baseline. Psychiatric:         Mood and Affect: Mood normal.         Behavior: Behavior normal.         Judgment: Judgment normal.           ASSESSMENT and PLAN  Diagnoses and all orders for this visit:    1. Prediabetes  A1c per POC today 6.0, little change from A1c of 5.8 on 10/23/2019. Pt will have updated lab work performed during today's OV.   -     AMB POC HEMOGLOBIN A1C    2. Hyperlipidemia with target LDL less than 130  Lipid panel on 5/27/2020 notable for total cholesterol 204, HDL 83, , and triglycerides 60. Pt continues with her current reigmen.   -     METABOLIC PANEL, COMPREHENSIVE; Future  -     LIPID PANEL; Future    3. Essential hypertension  BP at office today 133/76. Pt continues with Benicar 40 mg/day and HCTZ 25 mg/day. -     METABOLIC PANEL, COMPREHENSIVE; Future  -     CBC WITH AUTOMATED DIFF; Future    4. Anxiety  Pt continues with Xanax 0.25 mg/day PRN. The Prescription Monitoring Program has been reviewed for recent activity regarding controlled substances for this patient. Pt requests a refill of their medication, which I have granted. -     ALPRAZolam (XANAX) 0.25 mg tablet; Take 1 Tab by mouth nightly as needed for Anxiety.  Max Daily Amount: 0.25 mg.    5. Bilateral malignant neoplasm of breast in female, estrogen receptor positive, unspecified site of breast ([facility]  Pt is under the care of Dr. Stefanie Gusman for this condition. She reports she has finished her treatment. Assessment & Plan: This condition is managed by Specialist.      6. Obesity, Class I, BMI 30-34.9  I have reviewed/discussed the above normal BMI with the patient. I have recommended the following interventions: dietary management education, guidance, and counseling, encourage exercise and monitor weight . 7. Encounter for immunization  Provided pt with prescription and will follow up with local pharmacy for Shingrix vaccine. -     varicella-zoster recombinant, PF, (Shingrix, PF,) 50 mcg/0.5 mL susr injection; 0.5 mL by IntraMUSCular route once for 1 dose. Follow-up and Dispositions    · Return in about 6 months (around 10/12/2021) for hypertension, diabetes, cholesterol follow up. Medication risks/benefits/costs/interactions/alternatives discussed with patient. Advised patient to call back or return to office if symptoms worsen/change/persist.  If patient cannot reach us or should anything more severe/urgent arise he/she should proceed directly to the nearest emergency department. Discussed expected course/resolution/complications of diagnosis in detail with patient. Patient given a written after visit summary which includes diagnoses, current medications and vitals. Patient expressed understanding with the diagnosis and plan. Written by talia Bello, as dictated by Malik Dallas M.D.    3:25 PM - 3:42 PM    Total time spent with the patient 17 minutes, greater than 50% of time spent counseling patient.

## 2021-04-13 LAB
ALBUMIN SERPL-MCNC: 4.5 G/DL (ref 3.5–5)
ALBUMIN/GLOB SERPL: 1.4 {RATIO} (ref 1.1–2.2)
ALP SERPL-CCNC: 140 U/L (ref 45–117)
ALT SERPL-CCNC: 35 U/L (ref 12–78)
ANION GAP SERPL CALC-SCNC: 8 MMOL/L (ref 5–15)
AST SERPL-CCNC: 24 U/L (ref 15–37)
BASOPHILS # BLD: 0 K/UL (ref 0–0.1)
BASOPHILS NFR BLD: 0 % (ref 0–1)
BILIRUB SERPL-MCNC: 0.3 MG/DL (ref 0.2–1)
BUN SERPL-MCNC: 28 MG/DL (ref 6–20)
BUN/CREAT SERPL: 25 (ref 12–20)
CALCIUM SERPL-MCNC: 10.4 MG/DL (ref 8.5–10.1)
CHLORIDE SERPL-SCNC: 102 MMOL/L (ref 97–108)
CHOLEST SERPL-MCNC: 256 MG/DL
CO2 SERPL-SCNC: 28 MMOL/L (ref 21–32)
CREAT SERPL-MCNC: 1.12 MG/DL (ref 0.55–1.02)
DIFFERENTIAL METHOD BLD: ABNORMAL
EOSINOPHIL # BLD: 0.2 K/UL (ref 0–0.4)
EOSINOPHIL NFR BLD: 2 % (ref 0–7)
ERYTHROCYTE [DISTWIDTH] IN BLOOD BY AUTOMATED COUNT: 13.6 % (ref 11.5–14.5)
GLOBULIN SER CALC-MCNC: 3.3 G/DL (ref 2–4)
GLUCOSE SERPL-MCNC: 97 MG/DL (ref 65–100)
HCT VFR BLD AUTO: 40.1 % (ref 35–47)
HDLC SERPL-MCNC: 86 MG/DL
HDLC SERPL: 3 {RATIO} (ref 0–5)
HGB BLD-MCNC: 12.3 G/DL (ref 11.5–16)
IMM GRANULOCYTES # BLD AUTO: 0.1 K/UL (ref 0–0.04)
IMM GRANULOCYTES NFR BLD AUTO: 0 % (ref 0–0.5)
LDLC SERPL CALC-MCNC: 152 MG/DL (ref 0–100)
LIPID PROFILE,FLP: ABNORMAL
LYMPHOCYTES # BLD: 3.3 K/UL (ref 0.8–3.5)
LYMPHOCYTES NFR BLD: 23 % (ref 12–49)
MCH RBC QN AUTO: 27.7 PG (ref 26–34)
MCHC RBC AUTO-ENTMCNC: 30.7 G/DL (ref 30–36.5)
MCV RBC AUTO: 90.3 FL (ref 80–99)
MONOCYTES # BLD: 0.9 K/UL (ref 0–1)
MONOCYTES NFR BLD: 6 % (ref 5–13)
NEUTS SEG # BLD: 10 K/UL (ref 1.8–8)
NEUTS SEG NFR BLD: 69 % (ref 32–75)
NRBC # BLD: 0 K/UL (ref 0–0.01)
NRBC BLD-RTO: 0 PER 100 WBC
PLATELET # BLD AUTO: 395 K/UL (ref 150–400)
PMV BLD AUTO: 10.3 FL (ref 8.9–12.9)
POTASSIUM SERPL-SCNC: 5 MMOL/L (ref 3.5–5.1)
PROT SERPL-MCNC: 7.8 G/DL (ref 6.4–8.2)
RBC # BLD AUTO: 4.44 M/UL (ref 3.8–5.2)
SODIUM SERPL-SCNC: 138 MMOL/L (ref 136–145)
TRIGL SERPL-MCNC: 90 MG/DL (ref ?–150)
VLDLC SERPL CALC-MCNC: 18 MG/DL
WBC # BLD AUTO: 14.5 K/UL (ref 3.6–11)

## 2021-05-02 DIAGNOSIS — R89.9 ABNORMAL LABORATORY TEST: Primary | ICD-10-CM

## 2021-05-02 NOTE — PROGRESS NOTES
Mrs. Winston Section,    I want to recheck your CBC in about 2 weeks your white count is elevated and cells your neutrophil count I will put standing orders and if he can [facility] and have your labs done that be great. With regards to your cholesterol the LDL is quite elevated 152 it needs to be less than 100. I want you to work on diet and exercise. I know you have had a difficult year so hopefully we can reassess this in about 3 months. If you have any questions let me know about the cholesterol. With regards to the renal and liver function but her renal function is a bit elevated I want to recheck this again in 2 weeks also and I want you to be well-hydrated so was your calcium levels. Again I will place the lab orders and you can go to short [facility] draw center for labs the address is [address] [place] [address] and the phone number is [phone]

## 2021-05-18 ENCOUNTER — TELEPHONE (OUTPATIENT)
Dept: FAMILY MEDICINE CLINIC | Age: 66
End: 2021-05-18

## 2021-05-18 DIAGNOSIS — I10 ESSENTIAL HYPERTENSION: Primary | ICD-10-CM

## 2021-05-18 NOTE — TELEPHONE ENCOUNTER
Patient called and said [place] lab said CBC sample clotted and she needs order to redraw lab.     BCB# [phone]

## 2021-05-26 ENCOUNTER — OFFICE VISIT (OUTPATIENT)
Dept: SURGERY | Age: 66
End: 2021-05-26
Payer: COMMERCIAL

## 2021-05-26 VITALS
HEART RATE: 79 BPM | BODY MASS INDEX: 31.08 KG/M2 | WEIGHT: 198 LBS | DIASTOLIC BLOOD PRESSURE: 65 MMHG | HEIGHT: 67 IN | SYSTOLIC BLOOD PRESSURE: 121 MMHG

## 2021-05-26 DIAGNOSIS — D05.11 DUCTAL CARCINOMA IN SITU (DCIS) OF BOTH BREASTS: Primary | ICD-10-CM

## 2021-05-26 DIAGNOSIS — D05.12 DUCTAL CARCINOMA IN SITU (DCIS) OF BOTH BREASTS: Primary | ICD-10-CM

## 2021-05-26 DIAGNOSIS — Z86.000 HISTORY OF DUCTAL CARCINOMA IN SITU (DCIS) OF BREAST: ICD-10-CM

## 2021-05-26 PROCEDURE — 99213 OFFICE O/P EST LOW 20 MIN: CPT | Performed by: SURGERY

## 2021-05-26 NOTE — PROGRESS NOTES
HISTORY OF PRESENT ILLNESS  Era Cari is a 77 y.o. female. HPI  ESTABLISHED patient her for 6 month f/u BILATERAL mastectomies, BILATERAL breast cancer. Patient doing well with no problems. 2010: RIGHT breast DCIS, grade 2, ER+/WI+. Treated with lumpectomy, partial breast radiation (Dr. Joey Joe), and Tamoxifen. 07/17/20: LEFT breast biopsies. PATH:  - Site A: DCIS, nuclear grade 3 with comedonecrosis and associated calcifications, ER+(70%). Clinical stage 0.  - Site B: Focal PERINEURAL INVASION (see Comment). Sclerotic intraductal papilloma. Fibrocystic changes with usual ductal hyperplasia, columnar cell change and intraductal microcalcifications. Comment: Regarding the left breast site B core biopsy (specimen #2), there is a single focus of carcinoma present within a peripheral nerve sheath consistent with perineural invasion. No invasion is identified elsewhere within the biopsy specimen. This finding is concerning for the presence of invasive mammary carcinoma that was not sampled. A p63 and Smooth muscle myosin immunostain were performed and were both negative. There is insufficient tumor for biomarker testing. 10/22/20: BL skin-sparing mastectomies, with LEFT SLNBx, and immediate reconstruction by Dr. Raegan Ma. PATH:  - RIGHT: Single focus of DCIS, UIQ, 2mm, nuclear grade I, negative margins. Pathologic stage: pTis, pNX.  - LEFT: 2 foci of residual DCIS, each less than 2mm, nuclear grade III, negative margins, 0/5 LNs involved. Pathologic stage: pTis, pN0. ALH, intraductal papilloma, sclerosing adenosis, and florid usual ductal hyperplasia also noted.       JENNY Results (most recent):  Results from Hospital Encounter encounter on 07/17/20     JENNY POST BX IMAGING LT INCL CAD     Addendum 7/22/2020  1:51 PM  Addendum:  PATHOLOGY CORRELATION  . INDICATION:  Patient recently underwent core needle biopsy of the left breast.  . FINDINGS:  SITE A: Ductal carcinoma in situ.   SITE B: Focal perineural invasion. Sclerotic intraductal papilloma. Fibrocystic  changes with usual ductal hyperplasia, columnar cell change and intraductal  microcalcifications. .  IMPRESSION:  These are concordant results  . RECOMMENDATION:  Surgical consultation. Consider MRI for further evaluation, especially given the  inferior migration of the biopsy clip at site B.  .  Results of this biopsy were directly discussed with the patient by myself by  telephone.     Narrative  INDICATION: Concerning calcifications identified on recent mammogram.  COMPARISON: Previous mammogram sound, 7/10/2020. Pieter Haddad PROCEDURE:  After obtaining informed consent, and performing a \"time-out\" procedure, the  patient was placed on the stereotactic table with the left breast in the CC  position.  images were obtained. Due to technical factors, the breast was  then placed in reverse CC position.  images were again obtained. Each  target was identified, one representing the asymmetry and sonographic correlate  and the other representing a suspicious portion of the calcifications. Pieter Haddad SITE A: Calcifications more laterally, marked with a closed coil. SITE B: Asymmetry, more superior and medial, marked with an open coil. At each site:  Using sterile technique and less than 10 cc's lidocaine for local anesthesia, a  small skin incision was made and the vacuum-assisted Suros core needle was  advanced into the breast.  Appropriate needle position with respect to target  was documented with pre and post-fire images. Multiple cores were taken. Pieter Haddad Specimen radiography shows that calcifications were present in the cores from  SITE A. .  A biopsy clip was placed at each site to abad the biopsy site for future  localization purposes and their positions confirmed with post-procedure digital  mammograms. Of note, the open coil is displaced inferiorly relative to the  biopsy site which may have resolved after release of compression.   .  There were no apparent complications. .     Impression  IMPRESSION:  Stereotactic biopsy with Hydromark clip placement and pathology pending. Further  recommendations will be based on final pathology results. SITE A: Calcifications more laterally, marked with a closed coil. SITE B: Asymmetry, more superior and medial, marked with an open coil. Open coil  displaced inferiorly which may have resolved when compression was released.       Past Medical History:   Diagnosis Date    Allergic rhinitis     Arrhythmia     occ pvc''s    Breast cancer ([address] 2010    right breast    Breast cancer, left ([address] 2020    LEFT breast DCIS    Cancer ([address] 6/2010    right breast DCIS    Diverticulitis     Fracture     right wrist fx and surgery; pt fell     GERD (gastroesophageal reflux disease)     Hemorrhage, subarachnoid, traumatic ([address] 05/2020    Hypertension     Retinal tear of left eye 2/6/14       Past Surgical History:   Procedure Laterality Date    HX BREAST BIOPSY Bilateral 1970 70's and 2010 benign (surgical)    HX BREAST BIOPSY Left 05/2010    benign mri bx    HX BREAST BIOPSY Right 03/2010    positive stereo    HX BREAST BIOPSY Left 07/2020    HX BREAST LUMPECTOMY  4/2010    right breast    HX BREAST RECONSTRUCTION Bilateral 10/22/2020    BILATERAL BREAST RECONSTRUCTION WITH DIRECT TO IMPLANT AND ALLODERM performed by Dao Rubio MD at [facility] HX COLONOSCOPY  2014    HX ORTHOPAEDIC  05/2020    ORIF RIGHT WRIST    HX OTHER SURGICAL Left 2/6/14     Retinal tear repair by Dr. Ifeoma George HX TONSILLECTOMY      HX WISDOM TEETH EXTRACTION         Social History     Socioeconomic History    Marital status:      Spouse name: Not on file    Number of children: Not on file    Years of education: Not on file    Highest education level: Not on file   Occupational History    Not on file   Tobacco Use    Smoking status: Never Smoker    Smokeless tobacco: Never Used   Vaping Use    Vaping Use: Never used   Substance and Sexual Activity    Alcohol use: Yes     Alcohol/week: 3.0 standard drinks     Types: 3 Glasses of wine per week     Comment: 3/WK    Drug use: No    Sexual activity: Yes     Partners: Male   Other Topics Concern     Service No    Blood Transfusions No    Caffeine Concern No    Occupational Exposure No    Hobby Hazards No    Sleep Concern No    Stress Concern No    Weight Concern Yes    Special Diet No    Back Care No    Exercise No    Bike Helmet No    Seat Belt Yes    Self-Exams Yes   Social History Narrative    Not on file     Social Determinants of Health     Financial Resource Strain:     Difficulty of Paying Living Expenses:    Food Insecurity:     Worried About Running Out of Food in the Last Year:     Ran Out of Food in the Last Year:    Transportation Needs:     Lack of Transportation (Medical):  Lack of Transportation (Non-Medical):    Physical Activity:     Days of Exercise per Week:     Minutes of Exercise per Session:    Stress:     Feeling of Stress :    Social Connections:     Frequency of Communication with Friends and Family:     Frequency of Social Gatherings with Friends and Family:     Attends Quaker Services:     Active Member of Clubs or Organizations:     Attends Club or Organization Meetings:     Marital Status:    Intimate Partner Violence:     Fear of Current or Ex-Partner:     Emotionally Abused:     Physically Abused:     Sexually Abused:        Current Outpatient Medications on File Prior to Visit   Medication Sig Dispense Refill    olmesartan (BENICAR) 40 mg tablet TAKE 1 TABLET BY MOUTH ONE TIME A DAY 90 Tab 0    fexofenadine (ALLEGRA) 180 mg tablet Take  by mouth.  ALPRAZolam (XANAX) 0.25 mg tablet Take 1 Tab by mouth nightly as needed for Anxiety. Max Daily Amount: 0.25 mg. 90 Tab 0    BIOTIN PO Take 1,000 mcg by mouth daily.       omeprazole (PRILOSEC) 20 mg capsule Take 20 mg by mouth every morning.  hydroCHLOROthiazide (HYDRODIURIL) 25 mg tablet TAKE 1 TABLET BY MOUTH EVERY DAY (Patient taking differently: Take 25 mg by mouth every morning. TAKE 1 TABLET BY MOUTH EVERY DAY) 90 Tab 1    acetaminophen (TYLENOL) 325 mg tablet Take 2 Tabs by mouth every six (6) hours as needed for Pain. 30 Tab 0    triamcinolone (NASACORT AQ) 55 mcg nasal inhaler 2 Sprays by Both Nostrils route nightly.  cyanocobalamin (VITAMIN B-12) 1,000 mcg tablet Take 1,000 mcg by mouth daily.  MULTIVITS-MIN/FA/CA CARB/VIT K (ONE-A-DAY WOMEN'S 50+ PO) Take 1 Tab by mouth daily. No current facility-administered medications on file prior to visit. Allergies   Allergen Reactions    Adhesive Rash       OB History    No obstetric history on file. Obstetric Comments   Menarche:  15. LMP: age 48. # of Children:  3. Age at Delivery of First Child:  22.   Hysterectomy/oophorectomy:  NO/NO. Breast Bx:  yes. Hx of Breast Feeding:  yes. BCP:  yes. Hormone therapy:  no.             ROS    Physical Exam  Exam conducted with a chaperone present. Cardiovascular:      Rate and Rhythm: Normal rate and regular rhythm. Heart sounds: Normal heart sounds. Pulmonary:      Breath sounds: Normal breath sounds. Chest:      Breasts: Breasts are symmetrical.         Right: Normal. No swelling, bleeding, inverted nipple, mass, nipple discharge, skin change or tenderness. Left: Normal. No swelling, bleeding, inverted nipple, mass, nipple discharge, skin change or tenderness. Lymphadenopathy:      Cervical:      Right cervical: No superficial, deep or posterior cervical adenopathy. Left cervical: No superficial, deep or posterior cervical adenopathy. Upper Body:      Right upper body: No supraclavicular or axillary adenopathy. Left upper body: No supraclavicular or axillary adenopathy. ASSESSMENT and PLAN    ICD-10-CM ICD-9-CM    1. Ductal carcinoma in situ (DCIS) of both breasts  D05.11 233.0     D05.12     2. History of ductal carcinoma in situ (DCIS) of breast  Z86.000 V13.89       Patient presents to follow up on BL DCIS, and is doing well overall. Well healed incisions s/p BL mastectomies with implant reconstruction, no evidence of recurrence. Will order genetic testing through [facility] for BL breast cancer, and call with the results. F/U PRN. This plan was reviewed with the patient and patient agrees. All questions were answered. Total time spent was 20 minutes.     Written by Spike Greenwood, as dictated by Dr. Last Hutson MD.

## 2021-05-26 NOTE — PROGRESS NOTES
HISTORY OF PRESENT ILLNESS Tali Delgado is a 77 y.o. female. HPI ESTABLISHED patient her for 6 month f/u BILATERAL mastectomies, BILATERAL breast cancer. Patient doing well with no problems. JENNY Results (most recent): 
Results from Hospital Encounter encounter on 07/17/20 JENNY POST BX IMAGING LT INCL CAD Addendum 7/22/2020  1:51 PM 
Addendum: 
PATHOLOGY CORRELATION Harjinder Elizabeth INDICATION: 
Patient recently underwent core needle biopsy of the left breast. 
. FINDINGS: 
SITE A: Ductal carcinoma in situ. SITE B: Focal perineural invasion. Sclerotic intraductal papilloma. Fibrocystic 
changes with usual ductal hyperplasia, columnar cell change and intraductal 
microcalcifications. Nicholes Coca IMPRESSION: 
These are concordant results Harjinder Elizabeth RECOMMENDATION: 
Surgical consultation. Consider MRI for further evaluation, especially given the 
inferior migration of the biopsy clip at site B. 
. 
Results of this biopsy were directly discussed with the patient by myself by 
telephone. Narrative INDICATION: Concerning calcifications identified on recent mammogram. 
COMPARISON: Previous mammogram sound, 7/10/2020. Harjinder Elizabeth PROCEDURE: 
After obtaining informed consent, and performing a \"time-out\" procedure, the 
patient was placed on the stereotactic table with the left breast in the CC 
position.  images were obtained. Due to technical factors, the breast was 
then placed in reverse CC position.  images were again obtained. Each 
target was identified, one representing the asymmetry and sonographic correlate 
and the other representing a suspicious portion of the calcifications. Harjinder Cocfbaian SITE A: Calcifications more laterally, marked with a closed coil. SITE B: Asymmetry, more superior and medial, marked with an open coil.  
At each site: 
Using sterile technique and less than 10 cc's lidocaine for local anesthesia, a 
small skin incision was made and the vacuum-assisted Suros core needle was 
advanced into the breast. Appropriate needle position with respect to target 
was documented with pre and post-fire images. Multiple cores were taken. Aziza Quinones Specimen radiography shows that calcifications were present in the cores from SITE A. . 
A biopsy clip was placed at each site to abad the biopsy site for future 
localization purposes and their positions confirmed with post-procedure digital 
mammograms. Of note, the open coil is displaced inferiorly relative to the 
biopsy site which may have resolved after release of compression. . 
There were no apparent complications. . 
 
Impression IMPRESSION: 
Stereotactic biopsy with Hydromark clip placement and pathology pending. Further 
recommendations will be based on final pathology results. SITE A: Calcifications more laterally, marked with a closed coil. SITE B: Asymmetry, more superior and medial, marked with an open coil. Open coil 
displaced inferiorly which may have resolved when compression was released. ROS Physical Exam 
 
ASSESSMENT and PLAN 
{ASSESSMENT/PLAN:[id number]}

## 2021-05-26 NOTE — PATIENT INSTRUCTIONS
Eating Healthy Foods: Care Instructions Your Care Instructions Eating healthy foods can help lower your risk for disease. Healthy food gives you energy and keeps your heart strong, your brain active, your muscles working, and your bones strong. A healthy diet includes a variety of foods from the basic food groups: grains, vegetables, fruits, milk and milk products, and meat and beans. Some people may eat more of their favorite foods from only one food group and, as a result, miss getting the nutrients they need. So, it is important to pay attention not only to what you eat but also to what you are missing from your diet. You can eat a healthy, balanced diet by making a few small changes. Follow-up care is a key part of your treatment and safety. Be sure to make and go to all appointments, and call your doctor if you are having problems. It's also a good idea to know your test results and keep a list of the medicines you take. How can you care for yourself at home? Look at what you eat · Keep a food diary for a week or two and record everything you eat or drink. Track the number of servings you eat from each food group. · For a balanced diet every day, eat a variety of: 
? 6 or more ounce-equivalents of grains, such as cereals, breads, crackers, rice, or pasta, every day. An ounce-equivalent is 1 slice of bread, 1 cup of ready-to-eat cereal, or ½ cup of cooked rice, cooked pasta, or cooked cereal. 
? 2½ cups of vegetables, especially: § Dark-green vegetables such as broccoli and spinach. § Orange vegetables such as carrots and sweet potatoes. § Dry beans (such as goetz and kidney beans) and peas (such as lentils). ? 2 cups of fresh, frozen, or canned fruit. A small apple or 1 banana or orange equals 1 cup. ? 3 cups of nonfat or low-fat milk, yogurt, or other milk products. ? 5½ ounces of meat and beans, such as chicken, fish, lean meat, beans, nuts, and seeds.  One egg, 1 tablespoon of peanut butter, ½ ounce nuts or seeds, or ¼ cup of cooked beans equals 1 ounce of meat. · Learn how to read food labels for serving sizes and ingredients. Fast-food and convenience-food meals often contain few or no fruits or vegetables. Make sure you eat some fruits and vegetables to make the meal more nutritious. · Look at your food diary. For each food group, add up what you have eaten and then divide the total by the number of days. This will give you an idea of how much you are eating from each food group. See if you can find some ways to change your diet to make it more healthy. Start small · Do not try to make dramatic changes to your diet all at once. You might feel that you are missing out on your favorite foods and then be more likely to fail. · Start slowly, and gradually change your habits. Try some of the following: ? Use whole wheat bread instead of white bread. ? Use nonfat or low-fat milk instead of whole milk. ? Eat brown rice instead of white rice, and eat whole wheat pasta instead of white-flour pasta. ? Try low-fat cheeses and low-fat yogurt. ? Add more fruits and vegetables to meals and have them for snacks. ? Add lettuce, tomato, cucumber, and onion to sandwiches. ? Add fruit to yogurt and cereal. 
Enjoy food · You can still eat your favorite foods. You just may need to eat less of them. If your favorite foods are high in fat, salt, and sugar, limit how often you eat them, but do not cut them out entirely. · Eat a wide variety of foods. Make healthy choices when eating out · The type of restaurant you choose can help you make healthy choices. Even fast-food chains are now offering more low-fat or healthier choices on the menu. · Choose smaller portions, or take half of your meal home. · When eating out, try: ? A veggie pizza with a whole wheat crust or grilled chicken (instead of sausage or pepperoni).  
? Pasta with roasted vegetables, grilled chicken, or marinara sauce instead of cream sauce. ? A vegetable wrap or grilled chicken wrap. ? Broiled or poached food instead of fried or breaded items. Make healthy choices easy · Buy packaged, prewashed, ready-to-eat fresh vegetables and fruits, such as baby carrots, salad mixes, and chopped or shredded broccoli and cauliflower. · Buy packaged, presliced fruits, such as melon or pineapple. · Choose 100% fruit or vegetable juice instead of soda. Limit juice intake to 4 to 6 oz (½ to ¾ cup) a day. · Blend low-fat yogurt, fruit juice, and canned or frozen fruit to make a smoothie for breakfast or a snack. Where can you learn more? Go to http://www.bellamy.com/ Enter T756 in the search box to learn more about \"Eating Healthy Foods: Care Instructions. \" Current as of: December 17, 2020               Content Version: 12.8 © 2006-2021 Healthwise, [facility]. Care instructions adapted under license by [employer] (which disclaims liability or warranty for this information). If you have questions about a medical condition or this instruction, always ask your healthcare professional. [place] [address] any warranty or liability for your use of this information.

## 2021-06-02 NOTE — MR AVS SNAPSHOT
Visit Information Date & Time Provider Department Dept. Phone Encounter #  
 9/18/2017  4:15 PM Mei Dupree  [address] [phone] [id number] Follow-up Instructions Return in about 1 month (around 10/18/2017) for hypertension follow up. Upcoming Health Maintenance Date Due Pneumococcal 19-64 Highest Risk (1 of 3 - PCV13) 2/14/1974 PAP AKA CERVICAL CYTOLOGY 3/12/2017 INFLUENZA AGE 9 TO ADULT 8/1/2017 BREAST CANCER SCRN MAMMOGRAM 5/8/2019 COLONOSCOPY 10/23/2024 DTaP/Tdap/Td series (2 - Td) 1/29/2025 Allergies as of 9/18/2017  Review Complete On: 9/18/2017 By: Mei Dupree MD  
 No Known Allergies Current Immunizations  Reviewed on 2/8/2017 Name Date Influenza Vaccine 9/16/2017, 10/12/2015 Influenza Vaccine Tushar Sherman) 10/5/2016 Influenza Vaccine PF 10/22/2013 10:15 AM  
 Tdap 1/29/2015 11:10 AM  
 Zoster Vaccine, Live 12/10/2016 Not reviewed this visit You Were Diagnosed With   
  
 Codes Comments Unspecified essential hypertension    -  Primary ICD-10-CM: I10 
ICD-9-CM: 401.9 Sleep disturbance     ICD-10-CM: G47.9 ICD-9-CM: 780.50 Prediabetes     ICD-10-CM: R73.03 
ICD-9-CM: 790.29 Vitals BP Pulse Temp Resp Height(growth percentile) Weight(growth percentile) 146/90 (BP 1 Location: Right arm, BP Patient Position: Sitting) 60 97.2 °F (36.2 °C) (Oral) 16 5' 6\" (1.676 m) 204 lb (92.5 kg) SpO2 BMI OB Status Smoking Status 98% 32.93 kg/m2 Postmenopausal Never Smoker Vitals History BMI and BSA Data Body Mass Index Body Surface Area  
 32.93 kg/m 2 2.08 m 2 Preferred Pharmacy Pharmacy Name Phone [id number] [place], [address] [phone] Your Updated Medication List  
  
   
This list is accurate as of: 9/18/17  5:14 PM.  Always use your most recent med list.  
  
  
  
  
 ALPRAZolam 0.25 mg tablet Commonly known as:  XANAX  
take 1 tablet by mouth once daily if needed for anxiety  
  
 aspirin 81 mg chewable tablet Take 1 Tab by mouth daily. biotin 2,500 mcg Tab Take  by mouth. CLARITIN 10 mg tablet Generic drug:  loratadine Take 10 mg by mouth daily. hydroCHLOROthiazide 25 mg tablet Commonly known as:  HYDRODIURIL  
TAKE 1 TABLET BY MOUTH EVERY DAY  
  
 losartan 50 mg tablet Commonly known as:  COZAAR Take 2 Tabs by mouth daily. Indications: hypertension NASACORT AQ 55 mcg nasal inhaler Generic drug:  triamcinolone 2 Sprays by Both Nostrils route daily. ONE-A-DAY WOMEN'S 50+ PO Take 1 Tab by mouth daily. PriLOSEC OTC 20 mg tablet Generic drug:  omeprazole Take 20 mg by mouth daily. VITAMIN B-12 1,000 mcg tablet Generic drug:  cyanocobalamin Take 1,000 mcg by mouth daily. Prescriptions Printed Refills ALPRAZolam (XANAX) 0.25 mg tablet 0 Sig: take 1 tablet by mouth once daily if needed for anxiety Class: Print We Performed the Following AMB POC HEMOGLOBIN A1C [93600 CPT(R)] Follow-up Instructions Return in about 1 month (around 10/18/2017) for hypertension follow up. Introducing [facility]! Dear Lukas Stephens: Thank you for requesting a [employer] account. Our records indicate that you already have an active [employer] account. You can access your account anytime at [email or web address]. [email or web address] Did you know that you can access your hospital and ER discharge instructions at any time in [employer]? You can also review all of your test results from your hospital stay or ER visit. Additional Information If you have questions, please visit the Frequently Asked Questions section of the [employer] website at [email or web address] Remember, [employer] is NOT to be used for urgent needs. For medical emergencies, dial 911. Now available from your iPhone and Android! Please provide this summary of care documentation to your next provider. Your primary care clinician is listed as Zita Pac. If you have any questions after today's visit, please call [phone]. Patient used desonide for Rx change Detail Level: Detailed

## 2021-09-02 ENCOUNTER — TELEPHONE (OUTPATIENT)
Dept: SURGERY | Age: 66
End: 2021-09-02

## 2021-09-02 NOTE — TELEPHONE ENCOUNTER
Patient called and left voicemail with concerns of new redness in axilla which has worsened last yesterday evening. She at first tried calling Dr. Vlad Ramírez' office and was not given an appt until October. She was then deferred to her PCP but called here instead. I had Louis Pereira, call her immediately for an appointment but the patient did not answer the phone. Eduar Poole has tried calling the patient several times this afternoon to offer her an appt to see Dr. Melchor Wyatt.

## 2021-09-03 ENCOUNTER — OFFICE VISIT (OUTPATIENT)
Dept: SURGERY | Age: 66
End: 2021-09-03
Payer: COMMERCIAL

## 2021-09-03 VITALS — DIASTOLIC BLOOD PRESSURE: 76 MMHG | TEMPERATURE: 99.8 F | SYSTOLIC BLOOD PRESSURE: 151 MMHG | HEART RATE: 96 BPM

## 2021-09-03 DIAGNOSIS — D05.11 DUCTAL CARCINOMA IN SITU (DCIS) OF BOTH BREASTS: Primary | ICD-10-CM

## 2021-09-03 DIAGNOSIS — L53.9 ERYTHEMA: ICD-10-CM

## 2021-09-03 DIAGNOSIS — D05.12 DUCTAL CARCINOMA IN SITU (DCIS) OF BOTH BREASTS: Primary | ICD-10-CM

## 2021-09-03 DIAGNOSIS — R21 RASH: ICD-10-CM

## 2021-09-03 PROCEDURE — 76642 ULTRASOUND BREAST LIMITED: CPT | Performed by: SURGERY

## 2021-09-03 PROCEDURE — 99213 OFFICE O/P EST LOW 20 MIN: CPT | Performed by: SURGERY

## 2021-09-03 RX ORDER — VALACYCLOVIR HYDROCHLORIDE 1 G/1
1000 TABLET, FILM COATED ORAL 3 TIMES DAILY
Qty: 21 TABLET | Refills: 0 | Status: SHIPPED | OUTPATIENT
Start: 2021-09-03 | End: 2021-10-18

## 2021-09-03 RX ORDER — AMOXICILLIN AND CLAVULANATE POTASSIUM 875; 125 MG/1; MG/1
1 TABLET, FILM COATED ORAL 2 TIMES DAILY
Qty: 20 TABLET | Refills: 0 | Status: SHIPPED | OUTPATIENT
Start: 2021-09-03 | End: 2021-10-18 | Stop reason: ALTCHOICE

## 2021-09-03 NOTE — PATIENT INSTRUCTIONS
Breast Cancer: Care Instructions  Your Care Instructions     Breast cancer occurs when abnormal cells grow out of control in the breast. These cancer cells can spread within the breast, to nearby lymph nodes and other tissues, and to other parts of the body. Being treated for cancer can weaken your body, and you may feel very tired. Get the rest your body needs so you can feel better. Finding out that you have cancer is scary. You may feel many emotions and may need some help coping. Seek out family, friends, and counselors for support. You also can do things at home to make yourself feel better while you go through treatment. Call the [employer] (0-849.590.2487) or visit its website at [employer] for more information. Follow-up care is a key part of your treatment and safety. Be sure to make and go to all appointments, and call your doctor if you are having problems. It's also a good idea to know your test results and keep a list of the medicines you take. How can you care for yourself at home? · Take your medicines exactly as prescribed. Call your doctor if you think you are having a problem with your medicine. You may get medicine for nausea and vomiting if you have these side effects. · Follow your doctor's instructions to relieve pain. Pain from cancer and surgery can almost always be controlled. Use pain medicine when you first notice pain, before it becomes severe. · Eat healthy food. If you do not feel like eating, try to eat food that has protein and extra calories to keep up your strength and prevent weight loss. Drink liquid meal replacements for extra calories and protein. Try to eat your main meal early. · Get some physical activity every day, but do not get too tired. Keep doing the hobbies you enjoy as your energy allows. · Do not smoke. Smoking can make your cancer worse. If you need help quitting, talk to your doctor about stop-smoking programs and medicines.  These can increase your chances of quitting for good. · Take steps to control your stress and workload. Learn relaxation techniques. ? Share your feelings. Stress and tension affect our emotions. By expressing your feelings to others, you may be able to understand and cope with them. ? Consider joining a support group. Talking about a problem with your spouse, a good friend, or other people with similar problems is a good way to reduce tension and stress. ? Express yourself through art. Try writing, crafts, dance, or art to relieve stress. Some dance, writing, or art groups may be available just for people who have cancer. ? Be kind to your body and mind. Getting enough sleep, eating a healthy diet, and taking time to do things you enjoy can contribute to an overall feeling of balance in your life and can help reduce stress. ? Get help if you need it. Discuss your concerns with your doctor or counselor. · If you are vomiting or have diarrhea:  ? Drink plenty of fluids to prevent dehydration. Choose water and other caffeine-free clear liquids. If you have kidney, heart, or liver disease and have to limit fluids, talk with your doctor before you increase the amount of fluids you drink. ? When you are able to eat, try clear soups, mild foods, and liquids until all symptoms are gone for 12 to 48 hours. Other good choices include dry toast, crackers, cooked cereal, and gelatin dessert, such as Jell-O.  · If you have not already done so, prepare a list of advance directives. Advance directives are instructions to your doctor and family members about what kind of care you want if you become unable to speak or express yourself. When should you call for help? Call 911 anytime you think you may need emergency care. For example, call if:    · You passed out (lost consciousness).    Call your doctor now or seek immediate medical care if:    · You have a fever.     · You have abnormal bleeding.     · You think you have an infection.     · You have new or worse pain.     · You have new symptoms, such as a cough, belly pain, vomiting, diarrhea, or a rash. Watch closely for changes in your health, and be sure to contact your doctor if:    · You are much more tired than usual.     · You have swollen glands in your armpits, groin, or neck.     · You do not get better as expected. Where can you learn more? Go to http://www.[employer].com/  Enter V321 in the search box to learn more about \"Breast Cancer: Care Instructions. \"  Current as of: December 17, 2020               Content Version: 12.8  © 5839-1641 [employer]. Care instructions adapted under license by [employer] (which disclaims liability or warranty for this information). If you have questions about a medical condition or this instruction, always ask your healthcare professional. [address] any warranty or liability for your use of this information.

## 2021-09-03 NOTE — PROGRESS NOTES
HISTORY OF PRESENT ILLNESS  Bhanu Neville is a 77 y.o. female. HPI  ESTABLISHED patient here for axillary swelling and fevers. She has had fevers for the last couple of days. Having swelling and redness to LEFT axilla. She is having pain of 4/10 to the axilla.       Covid vaccine in March. 2010: RIGHT breast DCIS, grade 2, ER+/PA+. Treated with lumpectomy, partial breast radiation (Dr. Lizzy Neves), and Tamoxifen. 07/17/20: LEFT breast biopsies. PATH:  - Site A: DCIS, nuclear grade 3 with comedonecrosis and associated calcifications, ER+(70%). Clinical stage 0.  - Site B: Focal PERINEURAL INVASION (see Comment). Sclerotic intraductal papilloma. Fibrocystic changes with usual ductal hyperplasia, columnar cell change and intraductal microcalcifications. Comment: Regarding the left breast site B core biopsy (specimen #2), there is a single focus of carcinoma present within a peripheral nerve sheath consistent with perineural invasion. No invasion is identified elsewhere within the biopsy specimen. This finding is concerning for the presence of invasive mammary carcinoma that was not sampled. A p63 and Smooth muscle myosin immunostain were performed and were both negative. There is insufficient tumor for biomarker testing. 10/22/20: BL skin-sparing mastectomies, with LEFT SLNBx, and immediate reconstruction by Dr. Fitz Vieira. PATH:  - RIGHT: Single focus of DCIS, UIQ, 2mm, nuclear grade I, negative margins. Pathologic stage: pTis, pNX.  - LEFT: 2 foci of residual DCIS, each less than 2mm, nuclear grade III, negative margins, 0/5 LNs involved. Pathologic stage: pTis, pN0. ALH, intraductal papilloma, sclerosing adenosis, and florid usual ductal hyperplasia also noted. 06/09/21: BRCA1/2 Analyses with BRCANext-Expanded genetic testing. No clinically significant variants detected.           Past Medical History:   Diagnosis Date    Allergic rhinitis     Arrhythmia     occ pvc''s    Breast cancer ([facility] [address] 2010    right breast    Breast cancer, left ([address] 2020    LEFT breast DCIS    Cancer ([facility] [address] 6/2010    right breast DCIS    Diverticulitis     Fracture     right wrist fx and surgery; pt fell     GERD (gastroesophageal reflux disease)     Hemorrhage, subarachnoid, traumatic ([facility] [address] 05/2020    Hypertension     Retinal tear of left eye 2/6/14       Past Surgical History:   Procedure Laterality Date    HX BREAST BIOPSY Bilateral 1970 70's and 2010 benign (surgical)    HX BREAST BIOPSY Left 05/2010    benign mri bx    HX BREAST BIOPSY Right 03/2010    positive stereo    HX BREAST BIOPSY Left 07/2020    HX BREAST LUMPECTOMY  4/2010    right breast    HX BREAST RECONSTRUCTION Bilateral 10/22/2020    BILATERAL BREAST RECONSTRUCTION WITH DIRECT TO IMPLANT AND ALLODERM performed by Erika Perez MD at [address] HX COLONOSCOPY  2014    HX ORTHOPAEDIC  05/2020    ORIF RIGHT WRIST    HX OTHER SURGICAL Left 2/6/14     Retinal tear repair by Dr. Oscar Silva HX TONSILLECTOMY      HX WISDOM TEETH EXTRACTION         Social History     Socioeconomic History    Marital status:      Spouse name: Not on file    Number of children: Not on file    Years of education: Not on file    Highest education level: Not on file   Occupational History    Not on file   Tobacco Use    Smoking status: Never Smoker    Smokeless tobacco: Never Used   Vaping Use    Vaping Use: Never used   Substance and Sexual Activity    Alcohol use:  Yes     Alcohol/week: 3.0 standard drinks     Types: 3 Glasses of wine per week     Comment: 3/WK    Drug use: No    Sexual activity: Yes     Partners: Male   Other Topics Concern     Service No    Blood Transfusions No    Caffeine Concern No    Occupational Exposure No    Hobby Hazards No    Sleep Concern No    Stress Concern No    Weight Concern Yes    Special Diet No    Back Care No    Exercise No    Bike Helmet No    Seat Belt Yes    Self-Exams Yes   Social History Narrative    Not on file     Social Determinants of Health     Financial Resource Strain:     Difficulty of Paying Living Expenses:    Food Insecurity:     Worried About Running Out of Food in the Last Year:     [address] in the Last Year:    Transportation Needs:     Lack of Transportation (Medical):  Lack of Transportation (Non-Medical):    Physical Activity:     Days of Exercise per Week:     Minutes of Exercise per Session:    Stress:     Feeling of Stress :    Social Connections:     Frequency of Communication with Friends and Family:     Frequency of Social Gatherings with Friends and Family:     Attends Anglican Services:     Active Member of Clubs or Organizations:     Attends Club or Organization Meetings:     Marital Status:    Intimate Partner Violence:     Fear of Current or Ex-Partner:     Emotionally Abused:     Physically Abused:     Sexually Abused:        Current Outpatient Medications on File Prior to Visit   Medication Sig Dispense Refill    olmesartan (BENICAR) 40 mg tablet TAKE 1 TABLET BY MOUTH ONE TIME A DAY 90 Tab 0    fexofenadine (ALLEGRA) 180 mg tablet Take  by mouth.  ALPRAZolam (XANAX) 0.25 mg tablet Take 1 Tab by mouth nightly as needed for Anxiety. Max Daily Amount: 0.25 mg. 90 Tab 0    BIOTIN PO Take 1,000 mcg by mouth daily.  omeprazole (PRILOSEC) 20 mg capsule Take 20 mg by mouth every morning.  hydroCHLOROthiazide (HYDRODIURIL) 25 mg tablet TAKE 1 TABLET BY MOUTH EVERY DAY (Patient taking differently: Take 25 mg by mouth every morning. TAKE 1 TABLET BY MOUTH EVERY DAY) 90 Tab 1    acetaminophen (TYLENOL) 325 mg tablet Take 2 Tabs by mouth every six (6) hours as needed for Pain. 30 Tab 0    triamcinolone (NASACORT AQ) 55 mcg nasal inhaler 2 Sprays by Both Nostrils route nightly.  cyanocobalamin (VITAMIN B-12) 1,000 mcg tablet Take 1,000 mcg by mouth daily.         MULTIVITS-MIN/FA/CA CARB/VIT K (ONE-A-DAY WOMEN'S 50+ PO) Take 1 Tab by mouth daily. No current facility-administered medications on file prior to visit. Allergies   Allergen Reactions    Adhesive Rash       OB History    No obstetric history on file. Obstetric Comments   Menarche:  15. LMP: age 48. # of Children:  3. Age at Delivery of First Child:  22.   Hysterectomy/oophorectomy:  NO/NO. Breast Bx:  yes. Hx of Breast Feeding:  yes. BCP:  yes. Hormone therapy:  no.             ROS    Physical Exam  Exam conducted with a chaperone present. Cardiovascular:      Rate and Rhythm: Normal rate and regular rhythm. Heart sounds: Normal heart sounds. Pulmonary:      Breath sounds: Normal breath sounds. Chest:      Breasts: Breasts are symmetrical.         Right: Normal. No swelling, bleeding, inverted nipple, mass, nipple discharge, skin change or tenderness. Left: Normal. No swelling, bleeding, inverted nipple, mass, nipple discharge, skin change or tenderness. Lymphadenopathy:      Cervical:      Right cervical: No superficial, deep or posterior cervical adenopathy. Left cervical: No superficial, deep or posterior cervical adenopathy. Upper Body:      Right upper body: No supraclavicular or axillary adenopathy. Left upper body: No supraclavicular or axillary adenopathy. BREAST ULTRASOUND  Indication: Macular papular rash with erythema in the LEFT axilla, and mild erythema over the LEFT breast incision. Technique: The area was scanned using a high-frequency linear-array near-field transducer  Findings: Questionable small fluid collection deep in the axilla, no fluid around the implant. Impression: Suspect shingles, possible cellulitis. Disposition: Will treat for shingles, cellulitis. ASSESSMENT and PLAN    ICD-10-CM ICD-9-CM    1. Ductal carcinoma in situ (DCIS) of both breasts  D05.11 233.0     D05.12     2.  Rash  R21 782.1       Established patient presents for evaluation of fever and LEFT axillary pain and swelling, and is doing well overall. Macular papular rash with erythema in the LEFT axilla, and mild erythema over the LEFT breast incision. US visualizes questionable small fluid collection deep in the axilla, no fluid around the implant. Suspect shingles, possible cellulitis; will treat for both. F/U on Wednesday 09/08/21. This plan was reviewed with the patient and patient agrees. All questions were answered. Total time spent was 20 minutes.     Written by Chester Cogan, as dictated by Dr. Manisha Pendleton MD.

## 2021-09-03 NOTE — PROGRESS NOTES
HISTORY OF PRESENT ILLNESS  Kristie Gerard is a 77 y.o. female. HPI  ESTABLISHED patient here for axillary swelling and fevers. You have had fevers for the last couple of days. Having swelling and redness to LEFT axilla. She is having pain of 4/10 to the axilla. covid vaccine in March. 07/17/20: LEFT breast biopsies. PATH:  - Site A: DCIS, nuclear grade 3 with comedonecrosis and associated calcifications, ER+(70%). Clinical stage 0.  - Site B: Focal PERINEURAL INVASION (see Comment). Sclerotic intraductal papilloma. Fibrocystic changes with usual ductal hyperplasia, columnar cell change and intraductal microcalcifications. Comment: Regarding the left breast site B core biopsy (specimen #2), there is a single focus of carcinoma present within a peripheral nerve sheath consistent with perineural invasion. No invasion is identified elsewhere within the biopsy specimen. This finding is concerning for the presence of invasive mammary carcinoma that was not sampled. A p63 and Smooth muscle myosin immunostain were performed and were both negative. There is insufficient tumor for biomarker testing. 10/22/20: BL skin-sparing mastectomies, with LEFT SLNBx, and immediate reconstruction by Dr. Jimenez De Souza. PATH:  - RIGHT: Single focus of DCIS, UIQ, 2mm, nuclear grade I, negative margins. Pathologic stage: pTis, pNX.  - LEFT: 2 foci of residual DCIS, each less than 2mm, nuclear grade III, negative margins, 0/5 LNs involved. Pathologic stage: pTis, pN0. ALH, intraductal papilloma, sclerosing adenosis, and florid usual ductal hyperplasia also noted. 06/09/21: BRCA1/2 Analyses with BRCANext-Expanded genetic testing. No clinically significant variants detected.   ROS    Physical Exam    ASSESSMENT and PLAN  {ASSESSMENT/PLAN:[id number]}

## 2021-09-08 ENCOUNTER — OFFICE VISIT (OUTPATIENT)
Dept: SURGERY | Age: 66
End: 2021-09-08
Payer: COMMERCIAL

## 2021-09-08 VITALS — WEIGHT: 198 LBS | HEIGHT: 67 IN | BODY MASS INDEX: 31.08 KG/M2

## 2021-09-08 DIAGNOSIS — D05.11 DUCTAL CARCINOMA IN SITU (DCIS) OF BOTH BREASTS: Primary | ICD-10-CM

## 2021-09-08 DIAGNOSIS — R21 RASH: ICD-10-CM

## 2021-09-08 DIAGNOSIS — D05.12 DUCTAL CARCINOMA IN SITU (DCIS) OF BOTH BREASTS: Primary | ICD-10-CM

## 2021-09-08 DIAGNOSIS — Z86.000 HISTORY OF DUCTAL CARCINOMA IN SITU (DCIS) OF BREAST: ICD-10-CM

## 2021-09-08 PROCEDURE — 99213 OFFICE O/P EST LOW 20 MIN: CPT | Performed by: SURGERY

## 2021-09-08 NOTE — PROGRESS NOTES
HISTORY OF PRESENT ILLNESS  Stephany Tan is a 77 y.o. female. HPI ESTABLISHED patient here for follow up post op rash. The rash around the surgical site has lightened a little bit but had gotten worse at first. She woke up this morning and there was a new rash on the right eye and is very itchy and discolored. 2010: RIGHT breast DCIS, grade 2, ER+/AK+. Treated with lumpectomy, partial breast radiation (Dr. Naomia Frankel), and Tamoxifen. 07/17/20: LEFT breast biopsies. PATH:  - Site A: DCIS, nuclear grade 3 with comedonecrosis and associated calcifications, ER+(70%). Clinical stage 0.  - Site B: Focal PERINEURAL INVASION (see Comment). Sclerotic intraductal papilloma. Fibrocystic changes with usual ductal hyperplasia, columnar cell change and intraductal microcalcifications. Comment: Regarding the left breast site B core biopsy (specimen #2), there is a single focus of carcinoma present within a peripheral nerve sheath consistent with perineural invasion. No invasion is identified elsewhere within the biopsy specimen. This finding is concerning for the presence of invasive mammary carcinoma that was not sampled. A p63 and Smooth muscle myosin immunostain were performed and were both negative. There is insufficient tumor for biomarker testing. 10/22/20: BL skin-sparing mastectomies, with LEFT SLNBx, and immediate reconstruction by Dr. Denys Serra. PATH:  - RIGHT: Single focus of DCIS, UIQ, 2mm, nuclear grade I, negative margins. Pathologic stage: pTis, pNX.  - LEFT: 2 foci of residual DCIS, each less than 2mm, nuclear grade III, negative margins, 0/5 LNs involved. Pathologic stage: pTis, pN0. ALH, intraductal papilloma, sclerosing adenosis, and florid usual ductal hyperplasia also noted. 06/09/21: BRCA1/2 Analyses with BRCANext-Expanded genetic testing. No clinically significant variants detected.     ROS    Physical Exam    ASSESSMENT and PLAN  {ASSESSMENT/PLAN:[id number]}

## 2021-09-10 ENCOUNTER — OFFICE VISIT (OUTPATIENT)
Dept: SURGERY | Age: 66
End: 2021-09-10
Payer: COMMERCIAL

## 2021-09-10 VITALS — BODY MASS INDEX: 31.08 KG/M2 | HEIGHT: 67 IN | WEIGHT: 198 LBS

## 2021-09-10 DIAGNOSIS — R21 RASH: Primary | ICD-10-CM

## 2021-09-10 PROCEDURE — 99213 OFFICE O/P EST LOW 20 MIN: CPT | Performed by: SURGERY

## 2021-09-10 NOTE — PROGRESS NOTES
HISTORY OF PRESENT ILLNESS  Yolanda Giron is a 77 y.o. female. HPI ESTABLISHED Patient here for follow up rash. The area on her eye is starting to improve a lot and she feels like her chest is healing well also. 2010: RIGHT breast DCIS, grade 2, ER+/SC+. Treated with lumpectomy, partial breast radiation (Dr. Quique Wilson), and Tamoxifen.     07/17/20: LEFT breast biopsies. PATH:  · Site A: DCIS, nuclear grade 3 with comedonecrosis and associated calcifications, ER+(70%). Clinical stage 0.  · Site B: Focal PERINEURAL INVASION (see Comment). Sclerotic intraductal papilloma. Fibrocystic changes with usual ductal hyperplasia, columnar cell change and intraductal microcalcifications. Comment: Regarding the left breast site B core biopsy (specimen #2), there is a single focus of carcinoma present within a peripheral nerve sheath consistent with perineural invasion. No invasion is identified elsewhere within the biopsy specimen. This finding is concerning for the presence of invasive mammary carcinoma that was not sampled. A p63 and Smooth muscle myosin immunostain were performed and were both negative. There is insufficient tumor for biomarker testing.     10/22/20: BL skin-sparing mastectomies, with LEFT SLNBx, and immediate reconstruction by Dr. Steve Feldman. PATH:  · RIGHT: Single focus of DCIS, UIQ, 2mm, nuclear grade I, negative margins. Pathologic stage: pTis, pNX. · LEFT: 2 foci of residual DCIS, each less than 2mm, nuclear grade III, negative margins, 0/5 LNs involved. Pathologic stage: pTis, pN0. ALH, intraductal papilloma, sclerosing adenosis, and florid usual ductal hyperplasia also noted.     06/09/21: BRCA1/2 Analyses with BRCANext-Expanded genetic testing. No clinically significant variants detected.       ROS    Physical Exam    ASSESSMENT and PLAN  {ASSESSMENT/PLAN:[id number]}

## 2021-09-10 NOTE — PROGRESS NOTES
HISTORY OF PRESENT ILLNESS  Yolanda Giron is a 77 y.o. female. HPI  ESTABLISHED Patient here for follow up for LEFT breast rash. The area on her eye is starting to improve a lot and she feels like her chest is healing well also.      2010: RIGHT breast DCIS, grade 2, ER+/NV+. Treated with lumpectomy, partial breast radiation (Dr. Quique Wilson), and Tamoxifen.     07/17/20: LEFT breast biopsies. PATH:  · Site A: DCIS, nuclear grade 3 with comedonecrosis and associated calcifications, ER+(70%). Clinical stage 0.  · Site B: Focal PERINEURAL INVASION (see Comment). Sclerotic intraductal papilloma. Fibrocystic changes with usual ductal hyperplasia, columnar cell change and intraductal microcalcifications. Comment: Regarding the left breast site B core biopsy (specimen #2), there is a single focus of carcinoma present within a peripheral nerve sheath consistent with perineural invasion. No invasion is identified elsewhere within the biopsy specimen. This finding is concerning for the presence of invasive mammary carcinoma that was not sampled. A p63 and Smooth muscle myosin immunostain were performed and were both negative. There is insufficient tumor for biomarker testing.     10/22/20: BL skin-sparing mastectomies, with LEFT SLNBx, and immediate reconstruction by Dr. Steve Feldman. PATH:  · RIGHT: Single focus of DCIS, UIQ, 2mm, nuclear grade I, negative margins. Pathologic stage: pTis, pNX. · LEFT: 2 foci of residual DCIS, each less than 2mm, nuclear grade III, negative margins, 0/5 LNs involved. Pathologic stage: pTis, pN0. ALH, intraductal papilloma, sclerosing adenosis, and florid usual ductal hyperplasia also noted.     06/09/21: BRCA1/2 Analyses with BRCANext-Expanded genetic testing. No clinically significant variants detected.       Past Medical History:   Diagnosis Date    Allergic rhinitis     Arrhythmia     occ pvc''s    Breast cancer ([address] 2010    right breast    Breast cancer, left ([address] 2020    LEFT breast DCIS    [facility]) 6/2010    right breast DCIS    Diverticulitis     Fracture     right wrist fx and surgery; pt fell     GERD (gastroesophageal reflux disease)     Hemorrhage, subarachnoid, traumatic ([address] 05/2020    Hypertension     Retinal tear of left eye 2/6/14       Past Surgical History:   Procedure Laterality Date    HX BREAST BIOPSY Bilateral 1970 70's and 2010 benign (surgical)    HX BREAST BIOPSY Left 05/2010    benign mri bx    HX BREAST BIOPSY Right 03/2010    positive stereo    HX BREAST BIOPSY Left 07/2020    HX BREAST LUMPECTOMY  4/2010    right breast    HX BREAST RECONSTRUCTION Bilateral 10/22/2020    BILATERAL BREAST RECONSTRUCTION WITH DIRECT TO IMPLANT AND ALLODERM performed by Kasia Fan MD at [address] HX COLONOSCOPY  2014    HX ORTHOPAEDIC  05/2020    ORIF RIGHT WRIST    HX OTHER SURGICAL Left 2/6/14     Retinal tear repair by Dr. Ramesh Weller HX TONSILLECTOMY      HX WISDOM TEETH EXTRACTION         Social History     Socioeconomic History    Marital status:      Spouse name: Not on file    Number of children: Not on file    Years of education: Not on file    Highest education level: Not on file   Occupational History    Not on file   Tobacco Use    Smoking status: Never Smoker    Smokeless tobacco: Never Used   Vaping Use    Vaping Use: Never used   Substance and Sexual Activity    Alcohol use:  Yes     Alcohol/week: 3.0 standard drinks     Types: 3 Glasses of wine per week     Comment: 3/WK    Drug use: No    Sexual activity: Yes     Partners: Male   Other Topics Concern     Service No    Blood Transfusions No    Caffeine Concern No    Occupational Exposure No    Hobby Hazards No    Sleep Concern No    Stress Concern No    Weight Concern Yes    Special Diet No    Back Care No    Exercise No    Bike Helmet No    Seat Belt Yes    Self-Exams Yes   Social History Narrative    Not on file     Social Determinants of Health     Financial Resource Strain:     Difficulty of Paying Living Expenses:    Food Insecurity:     Worried About Running Out of Food in the Last Year:     [address] in the Last Year:    Transportation Needs:     Lack of Transportation (Medical):  Lack of Transportation (Non-Medical):    Physical Activity:     Days of Exercise per Week:     Minutes of Exercise per Session:    Stress:     Feeling of Stress :    Social Connections:     Frequency of Communication with Friends and Family:     Frequency of Social Gatherings with Friends and Family:     Attends Jewish Services:     Active Member of Clubs or Organizations:     Attends Club or Organization Meetings:     Marital Status:    Intimate Partner Violence:     Fear of Current or Ex-Partner:     Emotionally Abused:     Physically Abused:     Sexually Abused:        Current Outpatient Medications on File Prior to Visit   Medication Sig Dispense Refill    valACYclovir (VALTREX) 1 gram tablet Take 1 Tablet by mouth three (3) times daily. Indications: shingles 21 Tablet 0    amoxicillin-clavulanate (AUGMENTIN) 875-125 mg per tablet Take 1 Tablet by mouth two (2) times a day. 20 Tablet 0    olmesartan (BENICAR) 40 mg tablet TAKE 1 TABLET BY MOUTH ONE TIME A DAY 90 Tab 0    fexofenadine (ALLEGRA) 180 mg tablet Take  by mouth.  ALPRAZolam (XANAX) 0.25 mg tablet Take 1 Tab by mouth nightly as needed for Anxiety. Max Daily Amount: 0.25 mg. 90 Tab 0    BIOTIN PO Take 1,000 mcg by mouth daily.  omeprazole (PRILOSEC) 20 mg capsule Take 20 mg by mouth every morning.  hydroCHLOROthiazide (HYDRODIURIL) 25 mg tablet TAKE 1 TABLET BY MOUTH EVERY DAY (Patient taking differently: Take 25 mg by mouth every morning. TAKE 1 TABLET BY MOUTH EVERY DAY) 90 Tab 1    acetaminophen (TYLENOL) 325 mg tablet Take 2 Tabs by mouth every six (6) hours as needed for Pain.  30 Tab 0    triamcinolone (NASACORT AQ) 55 mcg nasal inhaler 2 Sprays by Both Nostrils route nightly.  cyanocobalamin (VITAMIN B-12) 1,000 mcg tablet Take 1,000 mcg by mouth daily.  MULTIVITS-MIN/FA/CA CARB/VIT K (ONE-A-DAY WOMEN'S 50+ PO) Take 1 Tab by mouth daily. No current facility-administered medications on file prior to visit. Allergies   Allergen Reactions    Adhesive Rash       OB History    No obstetric history on file. Obstetric Comments   Menarche:  15. LMP: age 48. # of Children:  3. Age at Delivery of First Child:  22.   Hysterectomy/oophorectomy:  NO/NO. Breast Bx:  yes. Hx of Breast Feeding:  yes. BCP:  yes. Hormone therapy:  no.             ROS    Physical Exam  Exam conducted with a chaperone present. Cardiovascular:      Rate and Rhythm: Normal rate and regular rhythm. Heart sounds: Normal heart sounds. Pulmonary:      Breath sounds: Normal breath sounds. Chest:      Breasts: Breasts are symmetrical.         Right: Normal. No swelling, bleeding, inverted nipple, mass, nipple discharge, skin change or tenderness. Left: Skin change (rash) present. No swelling, bleeding, inverted nipple, mass, nipple discharge or tenderness. Lymphadenopathy:      Cervical:      Right cervical: No superficial, deep or posterior cervical adenopathy. Left cervical: No superficial, deep or posterior cervical adenopathy. Upper Body:      Right upper body: No supraclavicular or axillary adenopathy. Left upper body: No supraclavicular or axillary adenopathy. ASSESSMENT and PLAN    ICD-10-CM ICD-9-CM    1. Rash  R21 782. 1       Patient presents to follow up on LEFT breast rash, and is doing well overall. Much better than Wednesday. Less erythema, no eruptions. Pt has finished valacyclovir, and will finish Augmentin, and will call if anything changes. This plan was reviewed with the patient and patient agrees. All questions were answered.     Total time spent was 20 minutes.     Written by Dejuan Goldman, as dictated by Dr. Bessie Thurman MD.

## 2021-09-21 DIAGNOSIS — F41.9 ANXIETY: ICD-10-CM

## 2021-09-22 DIAGNOSIS — I10 ESSENTIAL HYPERTENSION: ICD-10-CM

## 2021-09-22 DIAGNOSIS — F41.9 ANXIETY: ICD-10-CM

## 2021-09-24 RX ORDER — ALPRAZOLAM 0.25 MG/1
0.25 TABLET ORAL
Qty: 90 TABLET | Refills: 0 | OUTPATIENT
Start: 2021-09-24

## 2021-09-24 RX ORDER — OLMESARTAN MEDOXOMIL 40 MG/1
TABLET ORAL
Qty: 90 TABLET | Refills: 0 | Status: SHIPPED | OUTPATIENT
Start: 2021-09-24 | End: 2021-12-23

## 2021-09-24 RX ORDER — ALPRAZOLAM 0.25 MG/1
TABLET ORAL
Qty: 30 TABLET | Refills: 0 | Status: SHIPPED | OUTPATIENT
Start: 2021-09-24 | End: 2021-12-22

## 2021-09-24 RX ORDER — OLMESARTAN MEDOXOMIL 40 MG/1
40 TABLET ORAL DAILY
Qty: 90 TABLET | Refills: 0 | Status: SHIPPED | OUTPATIENT
Start: 2021-09-24 | End: 2021-10-18

## 2021-09-24 RX ORDER — HYDROCHLOROTHIAZIDE 25 MG/1
25 TABLET ORAL DAILY
Qty: 90 TABLET | Refills: 0 | Status: SHIPPED | OUTPATIENT
Start: 2021-09-24 | End: 2021-12-23

## 2021-09-24 NOTE — TELEPHONE ENCOUNTER
[id number] Lyndsey Talamantes has called requesting these medications for patient. Check  for Alprazolam. Thanks, Lavinia    Last Visit: 4/12/21 MD Bernie Dudley  Next Appointment: 10/18/21 MD Soliman  Previous Refill Encounter(s):   hctz 9/5/20 90 + 1  olmesartan 4/12/21 90  Alprazolam 4/12/21 90    Requested Prescriptions     Pending Prescriptions Disp Refills    hydroCHLOROthiazide (HYDRODIURIL) 25 mg tablet 90 Tablet 0     Sig: Take 1 Tablet by mouth daily.  olmesartan (BENICAR) 40 mg tablet 90 Tablet 0     Sig: Take 1 Tablet by mouth daily.  ALPRAZolam (XANAX) 0.25 mg tablet 90 Tablet 0     Sig: Take 1 Tablet by mouth nightly as needed for Anxiety. Max Daily Amount: 0.25 mg.

## 2021-10-18 ENCOUNTER — OFFICE VISIT (OUTPATIENT)
Dept: FAMILY MEDICINE CLINIC | Age: 66
End: 2021-10-18
Payer: COMMERCIAL

## 2021-10-18 VITALS
SYSTOLIC BLOOD PRESSURE: 139 MMHG | RESPIRATION RATE: 16 BRPM | BODY MASS INDEX: 31.39 KG/M2 | HEART RATE: 74 BPM | HEIGHT: 67 IN | TEMPERATURE: 97.7 F | DIASTOLIC BLOOD PRESSURE: 70 MMHG | OXYGEN SATURATION: 99 % | WEIGHT: 200 LBS

## 2021-10-18 DIAGNOSIS — E78.5 HYPERLIPIDEMIA WITH TARGET LDL LESS THAN 130: ICD-10-CM

## 2021-10-18 DIAGNOSIS — E66.9 OBESITY, CLASS I, BMI 30-34.9: ICD-10-CM

## 2021-10-18 DIAGNOSIS — R73.03 PREDIABETES: ICD-10-CM

## 2021-10-18 DIAGNOSIS — I10 ESSENTIAL HYPERTENSION: Primary | ICD-10-CM

## 2021-10-18 DIAGNOSIS — R89.9 ABNORMAL LABORATORY TEST RESULT: ICD-10-CM

## 2021-10-18 DIAGNOSIS — Z90.13 H/O BILATERAL MASTECTOMY: ICD-10-CM

## 2021-10-18 LAB
ALBUMIN SERPL-MCNC: 4 G/DL (ref 3.5–5)
ALBUMIN/GLOB SERPL: 1.2 {RATIO} (ref 1.1–2.2)
ALP SERPL-CCNC: 116 U/L (ref 45–117)
ALT SERPL-CCNC: 28 U/L (ref 12–78)
ANION GAP SERPL CALC-SCNC: 4 MMOL/L (ref 5–15)
AST SERPL-CCNC: 18 U/L (ref 15–37)
BASOPHILS # BLD: 0 K/UL (ref 0–0.1)
BASOPHILS NFR BLD: 0 % (ref 0–1)
BILIRUB SERPL-MCNC: 0.5 MG/DL (ref 0.2–1)
BUN SERPL-MCNC: 23 MG/DL (ref 6–20)
BUN/CREAT SERPL: 20 (ref 12–20)
CALCIUM SERPL-MCNC: 9.9 MG/DL (ref 8.5–10.1)
CHLORIDE SERPL-SCNC: 103 MMOL/L (ref 97–108)
CHOLEST SERPL-MCNC: 216 MG/DL
CO2 SERPL-SCNC: 28 MMOL/L (ref 21–32)
CREAT SERPL-MCNC: 1.16 MG/DL (ref 0.55–1.02)
DIFFERENTIAL METHOD BLD: ABNORMAL
EOSINOPHIL # BLD: 0.2 K/UL (ref 0–0.4)
EOSINOPHIL NFR BLD: 2 % (ref 0–7)
ERYTHROCYTE [DISTWIDTH] IN BLOOD BY AUTOMATED COUNT: 14.2 % (ref 11.5–14.5)
EST. AVERAGE GLUCOSE BLD GHB EST-MCNC: 126 MG/DL
GLOBULIN SER CALC-MCNC: 3.4 G/DL (ref 2–4)
GLUCOSE SERPL-MCNC: 104 MG/DL (ref 65–100)
HBA1C MFR BLD: 6 % (ref 4–5.6)
HCT VFR BLD AUTO: 38.2 % (ref 35–47)
HDLC SERPL-MCNC: 82 MG/DL
HDLC SERPL: 2.6 {RATIO} (ref 0–5)
HGB BLD-MCNC: 11.7 G/DL (ref 11.5–16)
IMM GRANULOCYTES # BLD AUTO: 0 K/UL (ref 0–0.04)
IMM GRANULOCYTES NFR BLD AUTO: 0 % (ref 0–0.5)
LDLC SERPL CALC-MCNC: 118.8 MG/DL (ref 0–100)
LYMPHOCYTES # BLD: 2.6 K/UL (ref 0.8–3.5)
LYMPHOCYTES NFR BLD: 23 % (ref 12–49)
MCH RBC QN AUTO: 28.3 PG (ref 26–34)
MCHC RBC AUTO-ENTMCNC: 30.6 G/DL (ref 30–36.5)
MCV RBC AUTO: 92.5 FL (ref 80–99)
MONOCYTES # BLD: 0.8 K/UL (ref 0–1)
MONOCYTES NFR BLD: 7 % (ref 5–13)
NEUTS SEG # BLD: 7.7 K/UL (ref 1.8–8)
NEUTS SEG NFR BLD: 68 % (ref 32–75)
NRBC # BLD: 0 K/UL (ref 0–0.01)
NRBC BLD-RTO: 0 PER 100 WBC
PLATELET # BLD AUTO: 443 K/UL (ref 150–400)
PMV BLD AUTO: 10 FL (ref 8.9–12.9)
POTASSIUM SERPL-SCNC: 5.3 MMOL/L (ref 3.5–5.1)
PROT SERPL-MCNC: 7.4 G/DL (ref 6.4–8.2)
RBC # BLD AUTO: 4.13 M/UL (ref 3.8–5.2)
SODIUM SERPL-SCNC: 135 MMOL/L (ref 136–145)
TRIGL SERPL-MCNC: 76 MG/DL (ref ?–150)
VLDLC SERPL CALC-MCNC: 15.2 MG/DL
WBC # BLD AUTO: 11.4 K/UL (ref 3.6–11)

## 2021-10-18 PROCEDURE — 99214 OFFICE O/P EST MOD 30 MIN: CPT | Performed by: FAMILY MEDICINE

## 2021-10-18 RX ORDER — LORATADINE 10 MG/1
10 TABLET ORAL
COMMUNITY

## 2021-10-18 RX ORDER — PHENTERMINE HYDROCHLORIDE 15 MG/1
15 CAPSULE ORAL
Qty: 30 CAPSULE | Refills: 0 | Status: SHIPPED | OUTPATIENT
Start: 2021-10-18

## 2021-10-18 NOTE — PROGRESS NOTES
Chief Complaint   Patient presents with    Hypertension    Cholesterol Problem     1. Have you been to the ER, urgent care clinic since your last visit? Hospitalized since your last visit? NO    2. Have you seen or consulted any other health care providers outside of the [address] [place] [address] since your last visit? Include any pap smears or colon screening.     for shingles across breast     Pap - pap 1 year ago - now beth other year   Mammogram - Mastectomy no more mammograms     Colonoscopy - yes UTD     Got shingles vaccine - got shingles

## 2021-10-18 NOTE — PROGRESS NOTES
HPI  Avril Gillespie 77 y.o. female  presents to the office today for HTN and Cholesterol Problem. Pt is fasting today. Blood pressure 139/70, pulse 74, temperature 97.7 °F (36.5 °C), temperature source Temporal, resp. rate 16, height 5' 7\" (1.702 m), weight 200 lb (90.7 kg), SpO2 99 %. Body mass index is 31.32 kg/m². Chief Complaint   Patient presents with    Hypertension    Cholesterol Problem     Hypertension: BP at office today 142/76 and 140/70 on manual recheck. Pt continues with Olmesartan 40mg 1 tablet orally daily and HCTZ 25mg 1 tablet orally daily. She denies being on any blood thinner medications. I have advised pt to monitor her BP at home and to reduce her sodium intake in her diet. I have also told her to keep me updated with her BP readings. Obesity: I have reviewed/discussed the above normal BMI with the patient. I have recommended the following interventions: dietary management education, guidance, and counseling, encourage exercise and monitor weight. Pt reports that she is back up to 200 pounds and that she is currently back on the treadmill. I have rx'd the pt Phentermine 15mg 1 capsule orally daily and advised her that if she does not see a ~25% reduction in her weight after taking the Phentermine then to discontinue it and to start taking Phentermine only after her BP drops below 130/90. Prediabetes: Pt's last A1c was 6.0 on 04/12/21. Pt is not currently on any diabetic medications. Hyperlipidemia: Lipid panel on 04/12/21 notable for total cholesterol 256, HDL 86, , and triglycerides 90. Pt is not currently on any statin therapies. Pt will have updated lab work performed during today's OV. She had an TIANNA of her right wrist on 06/2020. H/O Bilateral Mastectomy: Pt has a SHx of a Bilateral Skin Sparing Mastectomy, Left Breast [place] Node Biopsy and Reconstruction (Bilateral Breast) which was done on 10/22/20.  The mastectomy was performed by Benito Cloud, Breast Surgery and the reconstruction was done by Dr. Zoë Dumont, Plastic Surgery. She reported that she recently saw Yehuda Marie on 74/17/79 because she developed Shingles 3 weeks after receiving her Shingrix vaccination which resulted in Yehuda Marie putting her on antibiotic and antiviral medications. Of note, she states that the antibiotics and antiviral medications have improved the quality of her sxs(s). Pt reports that she has a f/u appointment with  on 01/2022 for her Mastectomy. Current Outpatient Medications   Medication Sig Dispense Refill    loratadine (Claritin) 10 mg tablet Take 10 mg by mouth.  phentermine (ADIPEX_P) 15 mg capsule Take 1 Capsule by mouth every morning. Max Daily Amount: 15 mg. Indications: weight loss management for an obese person 30 Capsule 0    olmesartan (BENICAR) 40 mg tablet TAKE 1 TABLET BY MOUTH ONE TIME A DAY 90 Tablet 0    ALPRAZolam (XANAX) 0.25 mg tablet TAKE ONE TABLET BY MOUTH NIGHTLY AS NEEDED FOR ANXIETY ( MAX DAILY AMOUNT 0.25MG) 30 Tablet 0    hydroCHLOROthiazide (HYDRODIURIL) 25 mg tablet Take 1 Tablet by mouth daily. 90 Tablet 0    BIOTIN PO Take 1,000 mcg by mouth daily.  omeprazole (PRILOSEC) 20 mg capsule Take 20 mg by mouth every morning.  acetaminophen (TYLENOL) 325 mg tablet Take 2 Tabs by mouth every six (6) hours as needed for Pain. 30 Tab 0    triamcinolone (NASACORT AQ) 55 mcg nasal inhaler 2 Sprays by Both Nostrils route nightly.  cyanocobalamin (VITAMIN B-12) 1,000 mcg tablet Take 1,000 mcg by mouth daily.  MULTIVITS-MIN/FA/CA CARB/VIT K (ONE-A-DAY WOMEN'S 50+ PO) Take 1 Tab by mouth daily.        Allergies   Allergen Reactions    Adhesive Rash     Past Medical History:   Diagnosis Date    Allergic rhinitis     Arrhythmia     occ pvc''s    Breast cancer ([facility] 2010    right breast    Breast cancer, left ([address] 2020    LEFT breast DCIS    Cancer ([facility] [address] 6/2010    right breast DCIS    Diverticulitis     Fracture right wrist fx and surgery; pt fell     GERD (gastroesophageal reflux disease)     Hemorrhage, subarachnoid, traumatic ([facility] [address] 05/2020    Hypertension     Retinal tear of left eye 2/6/14     Past Surgical History:   Procedure Laterality Date    HX BILATERAL MASTECTOMY Bilateral 44/94/3221    Dr. Isi Arboleda and Dr. Murphy Carry Bilateral     HX BREAST BIOPSY Bilateral 1970 70's and 2010 benign (surgical)    HX BREAST BIOPSY Left 05/2010    benign mri bx    HX BREAST BIOPSY Right 03/2010    positive stereo    HX BREAST BIOPSY Left 07/2020    HX BREAST LUMPECTOMY  4/2010    right breast    HX BREAST RECONSTRUCTION Bilateral 10/22/2020    BILATERAL BREAST RECONSTRUCTION WITH DIRECT TO IMPLANT AND ALLODERM performed by Ameya Mckee MD at [address] HX COLONOSCOPY  2014    HX ORTHOPAEDIC  05/2020    ORIF RIGHT WRIST    HX OTHER SURGICAL Left 2/6/14     Retinal tear repair by Dr. Marilyn Aburto HX Yenifer Renzo HX TONSILLECTOMY      HX WISDOM TEETH EXTRACTION       Family History   Problem Relation Age of Onset    Other Mother         overweight, gout    Heart Disease Mother         chf    Hypertension Mother     Glaucoma Father     Hypertension Father     Osteoporosis Paternal Grandmother     Diabetes Paternal Aunt 36        breast cancer    Breast Cancer Paternal Aunt 36    No Known Problems Sister     Heart Disease Brother     Anesth Problems Neg Hx      Social History     Tobacco Use    Smoking status: Never Smoker    Smokeless tobacco: Never Used   Substance Use Topics    Alcohol use: Yes     Alcohol/week: 3.0 standard drinks     Types: 3 Glasses of wine per week     Comment: 3/WK        Review of Systems   Constitutional: Negative for chills and fever. HENT: Negative for hearing loss and tinnitus. Eyes: Negative for blurred vision and double vision. Respiratory: Negative for cough and shortness of breath. Cardiovascular: Negative for chest pain and palpitations. Gastrointestinal: Negative for nausea and vomiting. Genitourinary: Negative for dysuria and frequency. Musculoskeletal: Negative for back pain and falls. Skin: Negative for itching and rash. Neurological: Negative for dizziness, loss of consciousness and headaches. Endo/Heme/Allergies: Negative. Psychiatric/Behavioral: Negative for depression. The patient is not nervous/anxious. Physical Exam  Constitutional:       Appearance: Normal appearance. HENT:      Head: Normocephalic and atraumatic. Right Ear: Tympanic membrane, ear canal and external ear normal.      Left Ear: Tympanic membrane, ear canal and external ear normal.      Nose: Nose normal.      Mouth/Throat:      Mouth: Mucous membranes are moist.      Pharynx: Oropharynx is clear. Eyes:      Extraocular Movements: Extraocular movements intact. Conjunctiva/sclera: Conjunctivae normal.      Pupils: Pupils are equal, round, and reactive to light. Cardiovascular:      Rate and Rhythm: Normal rate and regular rhythm. Pulses: Normal pulses. Heart sounds: Normal heart sounds. Pulmonary:      Effort: Pulmonary effort is normal.      Breath sounds: Normal breath sounds. Abdominal:      General: Abdomen is flat. Bowel sounds are normal.      Palpations: Abdomen is soft. Genitourinary:     General: Normal vulva. Rectum: Normal.   Musculoskeletal:         General: Normal range of motion. Skin:     General: Skin is warm and dry. Neurological:      General: No focal deficit present. Mental Status: She is alert and oriented to person, place, and time. Mental status is at baseline. Psychiatric:         Mood and Affect: Mood normal.         Behavior: Behavior normal.         Judgment: Judgment normal.           ASSESSMENT and PLAN  Diagnoses and all orders for this visit:    1.  Essential hypertension  BP at office today 142/76 and 140/70 on manual recheck. Pt continues with Olmesartan 40mg 1 tablet orally daily and HCTZ 25mg 1 tablet orally daily. She denies being on any blood thinner medications. I have advised pt to monitor her BP at home and to reduce her sodium intake in her diet. I have also told her to keep me updated with her BP readings.  -     METABOLIC PANEL, COMPREHENSIVE; Future  -     CBC WITH AUTOMATED DIFF; Future    2. Hyperlipidemia with target LDL less than 130  Lipid panel on 04/12/21 notable for total cholesterol 256, HDL 86, , and triglycerides 90. Pt is not currently on any statin therapies. Pt will have updated lab work performed during today's [facility]; Future  -     LIPID PANEL; Future    3. Prediabetes  Pt's last A1c was 6.0 on 04/12/21. Pt is not currently on any diabetic medications.   -     HEMOGLOBIN A1C WITH EAG; Future    4. Obesity, Class I, BMI 30-34.9  I have reviewed/discussed the above normal BMI with the patient. I have recommended the following interventions: dietary management education, guidance, and counseling, encourage exercise and monitor weight. Pt reports that she is back up to 200 pounds and that she is currently back on the treadmill. I have rx'd the pt Phentermine 15mg 1 capsule orally daily and advised her that if she does not see a ~25% reduction in her weight after taking the Phentermine then to discontinue it and to start taking Phentermine only after her BP drops below 130/90. -     phentermine (ADIPEX_P) 15 mg capsule; Take 1 Capsule by mouth every morning. Max Daily Amount: 15 mg. Indications: weight loss management for an obese person    5. H/O bilateral mastectomy  Pt has a SHx of a Bilateral Skin Sparing Mastectomy, Left Breast [place] Node Biopsy and Reconstruction (Bilateral Breast) which was done on 10/22/20. The mastectomy was performed by , Breast Surgery and the reconstruction was done by Dr. Dorisann Canavan, Plastic Surgery.  She reported that she recently saw Amirah Vanessa on 10/05/41 because she developed Shingles 3 weeks after receiving her Shingrix vaccination which resulted in Amirah Backers putting her on antibiotic and antiviral medications. Of note, she states that the antibiotics and antiviral medications have improved the quality of her sxs(s). Pt reports that she has a f/u appointment with  on 01/2022 for her Mastectomy. Follow-up and Dispositions    · Return in about 6 months (around 4/18/2022) for follow up. Medication risks/benefits/costs/interactions/alternatives discussed with patient. Advised patient to call back or return to office if symptoms worsen/change/persist.  If patient cannot reach us or should anything more severe/urgent arise he/she should proceed directly to the nearest emergency department. Discussed expected course/resolution/complications of diagnosis in detail with patient. Patient given a written after visit summary which includes diagnoses, current medications and vitals. Patient expressed understanding with the diagnosis and plan. Written by talia Holt, as dictated by Yoko Hernandez M.D.    9:51 AM - 10:07 AM    Total time spent with the patient 16 minutes, greater than 50% of time spent counseling patient.

## 2021-11-16 NOTE — PROGRESS NOTES
Mrs. Dwayne Montoya want to recheck a CBC on you this month it was slightly elevated and I want to do a peripheral smear I have noticed this trend. I do not think there is anything emergent but I still I just want to make sure that we get a whole picture of your complete blood count. Your cholesterol numbers have come down keep up the great work. A1c has gone up by a bit again diet and exercise is something we need to focus on as you get better. Your renal function is elevated but comparing to the previous renal functions is stable again lets focus on hydration and try to reduce any Motrin or Aleve or any NSAIDs. With regards to the labs you can either call her office to get CBC recheck or go to [facility] on [phone] they are open from 7 AM to 4:30 PM.Any questions let me know

## 2021-12-22 DIAGNOSIS — I10 ESSENTIAL HYPERTENSION: ICD-10-CM

## 2021-12-22 DIAGNOSIS — F41.9 ANXIETY: ICD-10-CM

## 2021-12-23 RX ORDER — ALPRAZOLAM 0.25 MG/1
TABLET ORAL
Qty: 30 TABLET | Refills: 0 | Status: SHIPPED | OUTPATIENT
Start: 2021-12-23 | End: 2022-03-28

## 2021-12-23 RX ORDER — HYDROCHLOROTHIAZIDE 25 MG/1
TABLET ORAL
Qty: 90 TABLET | Refills: 0 | Status: SHIPPED | OUTPATIENT
Start: 2021-12-23 | End: 2022-03-28

## 2021-12-23 RX ORDER — OLMESARTAN MEDOXOMIL 40 MG/1
TABLET ORAL
Qty: 90 TABLET | Refills: 0 | Status: SHIPPED | OUTPATIENT
Start: 2021-12-23 | End: 2022-03-28

## 2022-03-18 PROBLEM — D05.12 DUCTAL CARCINOMA IN SITU (DCIS) OF BOTH BREASTS: Status: ACTIVE | Noted: 2020-07-17

## 2022-03-18 PROBLEM — C50.211 MALIGNANT NEOPLASM OF UPPER-INNER QUADRANT OF RIGHT FEMALE BREAST (HCC): Status: ACTIVE | Noted: 2017-10-16

## 2022-03-18 PROBLEM — S06.6X1A TRAUMATIC SUBARACHNOID BLEED WITH LOC OF 30 MINUTES OR LESS (HCC): Status: ACTIVE | Noted: 2020-05-26

## 2022-03-18 PROBLEM — D05.11 DUCTAL CARCINOMA IN SITU (DCIS) OF BOTH BREASTS: Status: ACTIVE | Noted: 2020-07-17

## 2022-03-18 PROBLEM — Z90.13 H/O BILATERAL MASTECTOMY: Status: ACTIVE | Noted: 2021-10-18

## 2022-03-19 PROBLEM — E66.3 OVERWEIGHT: Status: ACTIVE | Noted: 2018-04-25

## 2022-03-20 PROBLEM — C50.919 BREAST CANCER (HCC): Status: ACTIVE | Noted: 2020-10-22

## 2022-03-28 DIAGNOSIS — I10 ESSENTIAL HYPERTENSION: ICD-10-CM

## 2022-03-28 DIAGNOSIS — F41.9 ANXIETY: ICD-10-CM

## 2022-03-28 RX ORDER — ALPRAZOLAM 0.25 MG/1
TABLET ORAL
Qty: 30 TABLET | Refills: 0 | Status: SHIPPED | OUTPATIENT
Start: 2022-03-28 | End: 2022-05-24 | Stop reason: SDUPTHER

## 2022-03-28 RX ORDER — OLMESARTAN MEDOXOMIL 40 MG/1
TABLET ORAL
Qty: 90 TABLET | Refills: 0 | Status: SHIPPED | OUTPATIENT
Start: 2022-03-28 | End: 2022-06-22 | Stop reason: SDUPTHER

## 2022-03-28 RX ORDER — HYDROCHLOROTHIAZIDE 25 MG/1
TABLET ORAL
Qty: 90 TABLET | Refills: 0 | Status: SHIPPED | OUTPATIENT
Start: 2022-03-28 | End: 2022-06-20

## 2022-05-24 ENCOUNTER — OFFICE VISIT (OUTPATIENT)
Dept: FAMILY MEDICINE CLINIC | Age: 67
End: 2022-05-24
Payer: COMMERCIAL

## 2022-05-24 VITALS
BODY MASS INDEX: 31.55 KG/M2 | WEIGHT: 201 LBS | SYSTOLIC BLOOD PRESSURE: 140 MMHG | OXYGEN SATURATION: 99 % | HEART RATE: 88 BPM | TEMPERATURE: 98.7 F | DIASTOLIC BLOOD PRESSURE: 72 MMHG | RESPIRATION RATE: 16 BRPM | HEIGHT: 67 IN

## 2022-05-24 DIAGNOSIS — C50.912 BILATERAL MALIGNANT NEOPLASM OF BREAST IN FEMALE, ESTROGEN RECEPTOR POSITIVE, UNSPECIFIED SITE OF BREAST (HCC): ICD-10-CM

## 2022-05-24 DIAGNOSIS — Z17.0 BILATERAL MALIGNANT NEOPLASM OF BREAST IN FEMALE, ESTROGEN RECEPTOR POSITIVE, UNSPECIFIED SITE OF BREAST (HCC): ICD-10-CM

## 2022-05-24 DIAGNOSIS — I10 ESSENTIAL HYPERTENSION: Primary | ICD-10-CM

## 2022-05-24 DIAGNOSIS — C50.911 BILATERAL MALIGNANT NEOPLASM OF BREAST IN FEMALE, ESTROGEN RECEPTOR POSITIVE, UNSPECIFIED SITE OF BREAST (HCC): ICD-10-CM

## 2022-05-24 DIAGNOSIS — F41.9 ANXIETY: ICD-10-CM

## 2022-05-24 DIAGNOSIS — R73.03 PREDIABETES: ICD-10-CM

## 2022-05-24 DIAGNOSIS — E78.5 HYPERLIPIDEMIA WITH TARGET LDL LESS THAN 130: ICD-10-CM

## 2022-05-24 PROCEDURE — 99214 OFFICE O/P EST MOD 30 MIN: CPT | Performed by: FAMILY MEDICINE

## 2022-05-24 PROCEDURE — 1123F ACP DISCUSS/DSCN MKR DOCD: CPT | Performed by: FAMILY MEDICINE

## 2022-05-24 RX ORDER — ALPRAZOLAM 0.25 MG/1
TABLET ORAL
Qty: 30 TABLET | Refills: 0 | Status: SHIPPED | OUTPATIENT
Start: 2022-05-24 | End: 2022-06-20

## 2022-05-24 NOTE — PROGRESS NOTES
Chief Complaint   Patient presents with    Hypertension    Follow Up Chronic Condition     1. \"Have you been to the ER, urgent care clinic since your last visit? Hospitalized since your last visit? \" no    2. \"Have you seen or consulted any other health care providers outside of the [address] [place] [address] since your last visit? \"  no    3. For patients aged 39-70: Has the patient had a colonoscopy / FIT/ Cologuard? No gap       If the patient is female:    4. For patients aged 41-77: Has the patient had a mammogram within the past 2 years? No gap       5. For patients aged 21-65: Has the patient had a pap smear?    No gap

## 2022-05-24 NOTE — PROGRESS NOTES
HPI  Donal Bogdan 79 y.o. female  presents to the office today for a follow up on hypertension. Pt is not fasting today (had viktoriya and shake). Hypertension: BP at office today 140/72. Pt continues with Olmesartan 40mg daily and HCTZ 25mg daily. Pre-DM2: Last A1c was 6.0 on 10/18/21. Pt not on medication for this. Hyperlipidemia: Lipid panel on 10/18/21 notable for total cholesterol 216, HDL 82, .8, and triglycerides 76. Pt is not currently on any statin therapies. Anxiety: Pt takes Xanax 0.25mg prn to help with sleep and is requesting refills. Pt was prescribed phentermine to help with weight loss but decided not to take it. Pt reports occasional purple-kinsey toes. She will try taking a baby aspirin for 90 days. Pt reports taking NSAID semi-regularly for hand pain. Health Maintenance:   Pt had second COVID booster a few weeks ago. Pt is due for Pneumococcal 20-- will discuss at next visit. Blood pressure (!) 140/72, pulse 88, temperature 98.7 °F (37.1 °C), temperature source Temporal, resp. rate 16, height 5' 7\" (1.702 m), weight 201 lb (91.2 kg), SpO2 99 %. Body mass index is 31.48 kg/m². Chief Complaint   Patient presents with    Hypertension    Follow Up Chronic Condition          Current Outpatient Medications   Medication Sig Dispense Refill    ALPRAZolam (XANAX) 0.25 mg tablet TAKE 1 TABLET BY MOUTH NIGHTLY AS NEEDED FOR ANXIETY. MAX DAILY AMOUNT IS 0.25MG 30 Tablet 0    hydroCHLOROthiazide (HYDRODIURIL) 25 mg tablet TAKE 1 TABLET BY MOUTH ONE TIME A DAY 90 Tablet 0    olmesartan (BENICAR) 40 mg tablet TAKE 1 TABLET BY MOUTH ONE TIME A DAY 90 Tablet 0    loratadine (Claritin) 10 mg tablet Take 10 mg by mouth.  BIOTIN PO Take 1,000 mcg by mouth daily.  omeprazole (PRILOSEC) 20 mg capsule Take 20 mg by mouth every morning.  acetaminophen (TYLENOL) 325 mg tablet Take 2 Tabs by mouth every six (6) hours as needed for Pain.  30 Tab 0    triamcinolone (NASACORT AQ) 55 mcg nasal inhaler 2 Sprays by Both Nostrils route nightly.  cyanocobalamin (VITAMIN B-12) 1,000 mcg tablet Take 1,000 mcg by mouth daily.  MULTIVITS-MIN/FA/CA CARB/VIT K (ONE-A-DAY WOMEN'S 50+ PO) Take 1 Tab by mouth daily.  phentermine (ADIPEX_P) 15 mg capsule Take 1 Capsule by mouth every morning. Max Daily Amount: 15 mg.  Indications: weight loss management for an obese person (Patient not taking: Reported on 5/24/2022) 30 Capsule 0     Allergies   Allergen Reactions    Adhesive Rash     Past Medical History:   Diagnosis Date    Allergic rhinitis     Arrhythmia     occ pvc''s    Breast cancer ([address] 2010    right breast    Breast cancer, left ([address] 2020    LEFT breast DCIS    Cancer ([address] 6/2010    right breast DCIS    Diverticulitis     Fracture     right wrist fx and surgery; pt fell     GERD (gastroesophageal reflux disease)     Hemorrhage, subarachnoid, traumatic ([address] 05/2020    Hypertension     Retinal tear of left eye 2/6/14     Past Surgical History:   Procedure Laterality Date    HX BILATERAL MASTECTOMY Bilateral 21/32/5486    Dr. Charla Barron and Dr. Octavia Schwartz Bilateral     HX BREAST BIOPSY Bilateral 1970 70's and 2010 benign (surgical)    HX BREAST BIOPSY Left 05/2010    benign mri bx    HX BREAST BIOPSY Right 03/2010    positive stereo    HX BREAST BIOPSY Left 07/2020    HX BREAST LUMPECTOMY  4/2010    right breast    HX BREAST RECONSTRUCTION Bilateral 10/22/2020    BILATERAL BREAST RECONSTRUCTION WITH DIRECT TO IMPLANT AND ALLODERM performed by Sid Zepeda MD at [address] HX COLONOSCOPY  2014    HX ORTHOPAEDIC  05/2020    ORIF RIGHT WRIST    HX OTHER SURGICAL Left 2/6/14     Retinal tear repair by Dr. Remedios Myers HX TONSIL AND ADENOIDECTOMY      HX TONSILLECTOMY      HX WISDOM TEETH EXTRACTION       Family History   Problem Relation Age of Onset    Other Mother overweight, gout    Heart Disease Mother         chf    Hypertension Mother     Glaucoma Father     Hypertension Father     Osteoporosis Paternal Grandmother     Diabetes Paternal Aunt 36        breast cancer    Breast Cancer Paternal Aunt 36    No Known Problems Sister     Heart Disease Brother     Anesth Problems Neg Hx      Social History     Tobacco Use    Smoking status: Never Smoker    Smokeless tobacco: Never Used   Substance Use Topics    Alcohol use: Yes     Alcohol/week: 3.0 standard drinks     Types: 3 Glasses of wine per week     Comment: 3/WK        Review of Systems   Constitutional: Negative for chills and fever. HENT: Negative for hearing loss and tinnitus. Eyes: Negative for blurred vision and double vision. Respiratory: Negative for cough and shortness of breath. Cardiovascular: Negative for chest pain and palpitations. Gastrointestinal: Negative for nausea and vomiting. Genitourinary: Negative for dysuria and frequency. Musculoskeletal: Negative for back pain and falls. Skin: Negative for itching and rash. Neurological: Negative for dizziness, loss of consciousness and headaches. Endo/Heme/Allergies: Negative. Psychiatric/Behavioral: Negative for depression. The patient is not nervous/anxious. Physical Exam  Vitals and nursing note reviewed. Constitutional:       General: She is not in acute distress. Appearance: She is well-developed. She is not diaphoretic. HENT:      Head: Normocephalic and atraumatic. Cardiovascular:      Rate and Rhythm: Normal rate and regular rhythm. Heart sounds: Normal heart sounds. No murmur heard. No friction rub. No gallop. Pulmonary:      Effort: Pulmonary effort is normal. No respiratory distress. Breath sounds: Normal breath sounds. No wheezing or rales. Chest:      Chest wall: No tenderness. Abdominal:      General: Bowel sounds are normal. There is no distension.       Palpations: Abdomen is soft.      Tenderness: There is no abdominal tenderness. Musculoskeletal:         General: Normal range of motion. Cervical back: Normal range of motion and neck supple. Neurological:      Mental Status: She is alert and oriented to person, place, and time. Psychiatric:         Behavior: Behavior normal.         Thought Content: Thought content normal.         Judgment: Judgment normal.           ASSESSMENT and PLAN  Diagnoses and all orders for this visit:    1. Essential hypertension  BP at office today 140/72. Pt continues with Olmesartan 40mg daily and HCTZ 25mg daily. Pt will have updated lab work performed during today's [address]; Future    2. Bilateral malignant neoplasm of breast in female, estrogen receptor positive, unspecified site of breast [facility])  Assessment & Plan:   monitored by specialist. No acute findings meriting change in the plan    3. Anxiety  Pt takes Xanax 0.25mg prn to help with sleep. Pt requests a refill of their medication, which I have granted. The Prescription Monitoring Program has been reviewed for recent activity regarding controlled substances for this patient. -     ALPRAZolam (XANAX) 0.25 mg tablet; TAKE 1 TABLET BY MOUTH NIGHTLY AS NEEDED FOR ANXIETY. MAX DAILY AMOUNT IS 0.25MG    4. Hyperlipidemia with target LDL less than 130  Lipid panel on 10/18/21 notable for total cholesterol 216, HDL 82, .8, and triglycerides 76. Pt is not currently on any statin therapies. Pt will have updated lab work performed during today's [address]; Future  -     LIPID PANEL; Future    5. Prediabetes   Last A1c was 6.0 on 10/18/21. Pt not on medication for this. Pt will have updated lab work performed during today's [address]; Future  -     HEMOGLOBIN A1C WITH EAG; Future    Follow-up and Dispositions    · Return in about 6 months (around 11/24/2022) for follow up. Medication risks/benefits/costs/interactions/alternatives discussed with patient. Advised patient to call back or return to office if symptoms worsen/change/persist.  If patient cannot reach us or should anything more severe/urgent arise he/she should proceed directly to the nearest emergency department. Discussed expected course/resolution/complications of diagnosis in detail with patient. Patient given a written after visit summary which includes diagnoses, current medications and vitals. Patient expressed understanding with the diagnosis and plan. Written by talia Gomez, as dictated by Jeana Holley M.D.    3:30 PM - 3:40 PM    Total time spent with the patient 10 minutes, greater than 50% of time spent counseling patient.

## 2022-05-25 LAB
ALBUMIN SERPL-MCNC: 4.3 G/DL (ref 3.5–5)
ALBUMIN/GLOB SERPL: 1.2 {RATIO} (ref 1.1–2.2)
ALP SERPL-CCNC: 107 U/L (ref 45–117)
ALT SERPL-CCNC: 32 U/L (ref 12–78)
ANION GAP SERPL CALC-SCNC: 5 MMOL/L (ref 5–15)
AST SERPL-CCNC: 21 U/L (ref 15–37)
BILIRUB SERPL-MCNC: 0.3 MG/DL (ref 0.2–1)
BUN SERPL-MCNC: 33 MG/DL (ref 6–20)
BUN/CREAT SERPL: 29 (ref 12–20)
CALCIUM SERPL-MCNC: 10.5 MG/DL (ref 8.5–10.1)
CHLORIDE SERPL-SCNC: 102 MMOL/L (ref 97–108)
CHOLEST SERPL-MCNC: 250 MG/DL
CO2 SERPL-SCNC: 29 MMOL/L (ref 21–32)
CREAT SERPL-MCNC: 1.15 MG/DL (ref 0.55–1.02)
EST. AVERAGE GLUCOSE BLD GHB EST-MCNC: 126 MG/DL
GLOBULIN SER CALC-MCNC: 3.5 G/DL (ref 2–4)
GLUCOSE SERPL-MCNC: 112 MG/DL (ref 65–100)
HBA1C MFR BLD: 6 % (ref 4–5.6)
HDLC SERPL-MCNC: 94 MG/DL
HDLC SERPL: 2.7 {RATIO} (ref 0–5)
LDLC SERPL CALC-MCNC: 138.8 MG/DL (ref 0–100)
POTASSIUM SERPL-SCNC: 4.8 MMOL/L (ref 3.5–5.1)
PROT SERPL-MCNC: 7.8 G/DL (ref 6.4–8.2)
SODIUM SERPL-SCNC: 136 MMOL/L (ref 136–145)
TRIGL SERPL-MCNC: 86 MG/DL (ref ?–150)
VLDLC SERPL CALC-MCNC: 17.2 MG/DL

## 2022-06-19 DIAGNOSIS — I10 ESSENTIAL HYPERTENSION: ICD-10-CM

## 2022-06-19 DIAGNOSIS — F41.9 ANXIETY: ICD-10-CM

## 2022-06-20 RX ORDER — HYDROCHLOROTHIAZIDE 25 MG/1
TABLET ORAL
Qty: 90 TABLET | Refills: 0 | Status: SHIPPED | OUTPATIENT
Start: 2022-06-20 | End: 2022-09-06

## 2022-06-20 RX ORDER — ALPRAZOLAM 0.25 MG/1
TABLET ORAL
Qty: 30 TABLET | Refills: 0 | Status: SHIPPED | OUTPATIENT
Start: 2022-06-20 | End: 2022-09-06

## 2022-06-22 RX ORDER — OLMESARTAN MEDOXOMIL 40 MG/1
TABLET ORAL
Qty: 90 TABLET | Refills: 0 | Status: SHIPPED | OUTPATIENT
Start: 2022-06-22

## 2022-09-05 DIAGNOSIS — F41.9 ANXIETY: ICD-10-CM

## 2022-09-05 DIAGNOSIS — I10 ESSENTIAL HYPERTENSION: ICD-10-CM

## 2022-09-06 RX ORDER — HYDROCHLOROTHIAZIDE 25 MG/1
TABLET ORAL
Qty: 90 TABLET | Refills: 0 | Status: SHIPPED | OUTPATIENT
Start: 2022-09-06

## 2022-09-06 RX ORDER — ALPRAZOLAM 0.25 MG/1
TABLET ORAL
Qty: 30 TABLET | Refills: 0 | Status: SHIPPED | OUTPATIENT
Start: 2022-09-06

## 2022-09-06 NOTE — TELEPHONE ENCOUNTER
Requested Prescriptions     Pending Prescriptions Disp Refills    ALPRAZolam (XANAX) 0.25 mg tablet [Pharmacy Med Name: ALPRAZOLAM 0.25MG TABS] 30 Tablet 0     Sig: TAKE ONE TABLET BY MOUTH NIGHTLY AS NEEDED FOR ANXIETY (MAX DAILY AMOUNT IS 0.25MG)    hydroCHLOROthiazide (HYDRODIURIL) 25 mg tablet [Pharmacy Med Name: HYDROCHLOROTHIAZIDE 25MG TABS] 90 Tablet 0     Sig: TAKE 1 TABLET BY MOUTH ONE TIME A DAY   ;

## 2022-11-28 ENCOUNTER — OFFICE VISIT (OUTPATIENT)
Dept: FAMILY MEDICINE CLINIC | Age: 67
End: 2022-11-28
Payer: COMMERCIAL

## 2022-11-28 VITALS
HEIGHT: 67 IN | BODY MASS INDEX: 32.49 KG/M2 | SYSTOLIC BLOOD PRESSURE: 139 MMHG | HEART RATE: 77 BPM | RESPIRATION RATE: 16 BRPM | DIASTOLIC BLOOD PRESSURE: 79 MMHG | WEIGHT: 207 LBS | OXYGEN SATURATION: 97 % | TEMPERATURE: 97.9 F

## 2022-11-28 DIAGNOSIS — I10 ESSENTIAL HYPERTENSION: ICD-10-CM

## 2022-11-28 DIAGNOSIS — R73.03 PREDIABETES: ICD-10-CM

## 2022-11-28 DIAGNOSIS — E78.5 HYPERLIPIDEMIA WITH TARGET LDL LESS THAN 130: ICD-10-CM

## 2022-11-28 DIAGNOSIS — I10 ESSENTIAL HYPERTENSION: Primary | ICD-10-CM

## 2022-11-28 DIAGNOSIS — E66.9 OBESITY, CLASS I, BMI 30-34.9: ICD-10-CM

## 2022-11-28 DIAGNOSIS — F41.9 ANXIETY: ICD-10-CM

## 2022-11-28 PROCEDURE — 99214 OFFICE O/P EST MOD 30 MIN: CPT | Performed by: FAMILY MEDICINE

## 2022-11-28 PROCEDURE — 3074F SYST BP LT 130 MM HG: CPT | Performed by: FAMILY MEDICINE

## 2022-11-28 PROCEDURE — 3078F DIAST BP <80 MM HG: CPT | Performed by: FAMILY MEDICINE

## 2022-11-28 PROCEDURE — 1123F ACP DISCUSS/DSCN MKR DOCD: CPT | Performed by: FAMILY MEDICINE

## 2022-11-28 NOTE — PROGRESS NOTES
HPI  Idris Moura 79 y.o. female  presents to the office today for follow up on cholesterol and hypertension. Blood pressure 139/79, pulse 77, temperature 97.9 °F (36.6 °C), temperature source Temporal, resp. rate 16, height 5' 7\" (1.702 m), weight 207 lb (93.9 kg), SpO2 97 %. Body mass index is 32.42 kg/m². Chief Complaint   Patient presents with    Cholesterol Problem    Hypertension      Hypertension: BP at office today 140/70 and 139/79 on manual recheck. She doesn't check BP at home. Pt continues with Olmesartan 40mg daily and HCTZ 25mg daily. Hyperlipidemia: Lipid panel on 5/24/22 notable for total cholesterol 250, HDL 94, .8, and triglycerides 86. Pt is not currently on any statin therapies. Pre-DM2: A1c was 6.0 on 5/24/22. Health Maintenance:   Pt had flu vaccine and COVID bivalent booster ([employer]) on 10/11/22. Current Outpatient Medications   Medication Sig Dispense Refill    ALPRAZolam (XANAX) 0.25 mg tablet TAKE ONE TABLET BY MOUTH NIGHTLY AS NEEDED FOR ANXIETY (MAX DAILY AMOUNT IS 0.25MG) 30 Tablet 0    hydroCHLOROthiazide (HYDRODIURIL) 25 mg tablet TAKE 1 TABLET BY MOUTH ONE TIME A DAY 90 Tablet 0    olmesartan (BENICAR) 40 mg tablet TAKE 1 TABLET BY MOUTH ONE TIME A DAY 90 Tablet 0    loratadine (CLARITIN) 10 mg tablet Take 10 mg by mouth. BIOTIN PO Take 1,000 mcg by mouth daily. omeprazole (PRILOSEC) 20 mg capsule Take 20 mg by mouth every morning. acetaminophen (TYLENOL) 325 mg tablet Take 2 Tabs by mouth every six (6) hours as needed for Pain. 30 Tab 0    triamcinolone (NASACORT AQ) 55 mcg nasal inhaler 2 Sprays by Both Nostrils route nightly. cyanocobalamin 1,000 mcg tablet Take 1,000 mcg by mouth daily. MULTIVITS-MIN/FA/CA CARB/VIT K (ONE-A-DAY WOMEN'S 50+ PO) Take 1 Tab by mouth daily.        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 Jase Cordero and Dr. Frandy Perdomo Bilateral     HX BREAST BIOPSY Bilateral 1970 70's and 2010 benign (surgical)    HX BREAST BIOPSY Left 05/2010    benign mri bx    HX BREAST BIOPSY Right 03/2010    positive stereo    HX BREAST BIOPSY Left 07/2020    HX BREAST LUMPECTOMY  4/2010    right breast    HX BREAST RECONSTRUCTION Bilateral 10/22/2020    BILATERAL BREAST RECONSTRUCTION WITH DIRECT TO IMPLANT AND ALLODERM performed by Sony Reddy MD at [place] [address]    HX COLONOSCOPY  2014    HX ORTHOPAEDIC  05/2020    ORIF RIGHT WRIST    HX OTHER SURGICAL Left 2/6/14     Retinal tear repair by Dr. Philip Deleon      HX TONSILLECTOMY      HX WISDOM TEETH EXTRACTION       Family History   Problem Relation Age of Onset    Other Mother         overweight, gout    Heart Disease Mother         chf    Hypertension Mother     Glaucoma Father     Hypertension Father     Osteoporosis Paternal Grandmother     Diabetes Paternal Aunt 44        breast cancer    Breast Cancer Paternal Aunt 36    No Known Problems Sister     Heart Disease Brother     Anesth Problems Neg Hx      Social History     Tobacco Use    Smoking status: Never    Smokeless tobacco: Never   Substance Use Topics    Alcohol use: Yes     Alcohol/week: 3.0 standard drinks     Types: 3 Glasses of wine per week     Comment: 3/WK        Review of Systems   Constitutional:  Negative for chills and fever. HENT:  Negative for hearing loss and tinnitus. Eyes:  Negative for blurred vision and double vision. Respiratory:  Negative for cough and shortness of breath. Cardiovascular:  Negative for chest pain and palpitations. Gastrointestinal:  Negative for nausea and vomiting. Genitourinary:  Negative for dysuria and frequency. Musculoskeletal:  Negative for back pain and falls. Skin:  Negative for itching and rash. Neurological:  Negative for dizziness, loss of consciousness and headaches. Endo/Heme/Allergies: Negative. Psychiatric/Behavioral:  Negative for depression. The patient is not nervous/anxious. Physical Exam  Vitals and nursing note reviewed. Constitutional:       General: She is not in acute distress. Appearance: She is well-developed. She is not diaphoretic. HENT:      Head: Normocephalic and atraumatic. Cardiovascular:      Rate and Rhythm: Normal rate and regular rhythm. Heart sounds: Normal heart sounds. No murmur heard. No friction rub. No gallop. Pulmonary:      Effort: Pulmonary effort is normal. No respiratory distress. Breath sounds: Normal breath sounds. No wheezing or rales. Chest:      Chest wall: No tenderness. Abdominal:      General: Bowel sounds are normal. There is no distension. Palpations: Abdomen is soft. Tenderness: There is no abdominal tenderness. Musculoskeletal:         General: Normal range of motion. Cervical back: Normal range of motion and neck supple. Neurological:      Mental Status: She is alert and oriented to person, place, and time. Psychiatric:         Behavior: Behavior normal.         Thought Content: Thought content normal.         Judgment: Judgment normal.         ASSESSMENT and PLAN  Diagnoses and all orders for this visit:    She would like to wait to get labs in April 2023.     1. Essential hypertension  Continue current regimen. I asked pt to start monitoring her BP at home. Will recheck CMP and CBC in 6 months.    -     METABOLIC PANEL, COMPREHENSIVE; Future  -     CBC WITH AUTOMATED DIFF; Future    2. Hyperlipidemia with target LDL less than 130  Continue current regimen. Recheck CMP and lipid panel in 6 months. -     METABOLIC PANEL, COMPREHENSIVE; Future  -     LIPID PANEL; Future    3. Prediabetes  Recheck A1c in 6 months.   -     HEMOGLOBIN A1C WITH EAG; Future    4. Obesity, Class I, BMI 30-34.9  I have reviewed/discussed the above normal BMI with the patient. I have recommended the following interventions: dietary management education, guidance, and counseling, encourage exercise and monitor weight. Follow-up and Dispositions    Return in about 6 months (around 5/28/2023) for hypertension, diabetes, cholesterol follow up. Medication risks/benefits/costs/interactions/alternatives discussed with patient. Advised patient to call back or return to office if symptoms worsen/change/persist.  If patient cannot reach us or should anything more severe/urgent arise he/she should proceed directly to the nearest emergency department. Discussed expected course/resolution/complications of diagnosis in detail with patient. Patient given a written after visit summary which includes diagnoses, current medications and vitals. Patient expressed understanding with the diagnosis and plan. Written by talia Willams, as dictated by Navarro Walden M.D.    2:20 PM - 2:35 PM    Total time spent with the patient 15 minutes, greater than 50% of time spent counseling patient.

## 2022-11-28 NOTE — PROGRESS NOTES
Chief Complaint   Patient presents with    Cholesterol Problem    Hypertension     1. \"Have you been to the ER, urgent care clinic since your last visit? Hospitalized since your last visit? \" no    2. \"Have you seen or consulted any other health care providers outside of the [address] [place] [address] since your last visit? \"  no

## 2022-11-29 RX ORDER — OLMESARTAN MEDOXOMIL 40 MG/1
TABLET ORAL
Qty: 90 TABLET | Refills: 1 | Status: SHIPPED | OUTPATIENT
Start: 2022-11-29

## 2022-12-01 DIAGNOSIS — I10 ESSENTIAL HYPERTENSION: ICD-10-CM

## 2022-12-01 DIAGNOSIS — F41.9 ANXIETY: ICD-10-CM

## 2022-12-01 RX ORDER — HYDROCHLOROTHIAZIDE 25 MG/1
TABLET ORAL
Qty: 90 TABLET | Refills: 0 | Status: SHIPPED | OUTPATIENT
Start: 2022-12-01

## 2022-12-01 RX ORDER — ALPRAZOLAM 0.25 MG/1
TABLET ORAL
Qty: 30 TABLET | Refills: 0 | Status: SHIPPED | OUTPATIENT
Start: 2022-12-01

## 2022-12-01 RX ORDER — HYDROCHLOROTHIAZIDE 25 MG/1
TABLET ORAL
Qty: 90 TABLET | Refills: 0 | OUTPATIENT
Start: 2022-12-01

## 2022-12-01 RX ORDER — ALPRAZOLAM 0.25 MG/1
TABLET ORAL
Qty: 30 TABLET | Refills: 0 | OUTPATIENT
Start: 2022-12-01

## 2022-12-02 ENCOUNTER — TELEPHONE (OUTPATIENT)
Dept: FAMILY MEDICINE CLINIC | Age: 67
End: 2022-12-02

## 2022-12-02 DIAGNOSIS — I10 ESSENTIAL HYPERTENSION: ICD-10-CM

## 2022-12-02 DIAGNOSIS — F41.9 ANXIETY: ICD-10-CM

## 2022-12-02 RX ORDER — ALPRAZOLAM 0.25 MG/1
TABLET ORAL
Qty: 30 TABLET | Refills: 0 | OUTPATIENT
Start: 2022-12-02

## 2022-12-02 RX ORDER — HYDROCHLOROTHIAZIDE 25 MG/1
TABLET ORAL
Qty: 90 TABLET | Refills: 0 | OUTPATIENT
Start: 2022-12-02

## 2022-12-02 NOTE — TELEPHONE ENCOUNTER
Duplicate      For Pharmacy [facility] in place:   Recommendation Provided To:    Intervention Detail: Discontinued Rx: 2, reason: Duplicate Therapy  Gap Closed?:   Intervention Accepted By:   Time Spent (min): 5

## 2022-12-02 NOTE — TELEPHONE ENCOUNTER
Karolina Martino called from [facility], stated that they did not receive Xanax and hydrochlorothiazide prescriptions.      [facility]# [phone]

## 2022-12-06 ENCOUNTER — TELEPHONE (OUTPATIENT)
Dept: FAMILY MEDICINE CLINIC | Age: 67
End: 2022-12-06

## 2022-12-06 NOTE — TELEPHONE ENCOUNTER
Christina Lehman, from [facility], called and stated \"We haven't received the patient's prescription for alprazolam.\" There is a listed sent date of 12/01/22 with a confirmed receipt time of 22:38 Hours.      Callback phone Number: [phone]    -AH TOLU TATUM

## 2022-12-08 NOTE — TELEPHONE ENCOUNTER
Contacted [facility] and gave verbal authorization to Pharmacist Federico Gordillo to refill Alprazolam per last refill on 12/1/22. Pharmacy claims they never received electronic version.

## 2022-12-14 ENCOUNTER — OFFICE VISIT (OUTPATIENT)
Dept: ORTHOPEDIC SURGERY | Age: 67
End: 2022-12-14
Payer: COMMERCIAL

## 2022-12-14 VITALS — BODY MASS INDEX: 32.49 KG/M2 | WEIGHT: 207 LBS | HEIGHT: 67 IN

## 2022-12-14 DIAGNOSIS — M19.072 ARTHRITIS OF MIDTARSAL JOINT OF LEFT FOOT: Primary | ICD-10-CM

## 2022-12-14 DIAGNOSIS — M79.672 LEFT FOOT PAIN: ICD-10-CM

## 2022-12-14 PROCEDURE — 99202 OFFICE O/P NEW SF 15 MIN: CPT | Performed by: ORTHOPAEDIC SURGERY

## 2022-12-14 PROCEDURE — 1123F ACP DISCUSS/DSCN MKR DOCD: CPT | Performed by: ORTHOPAEDIC SURGERY

## 2022-12-14 NOTE — PROGRESS NOTES
Ramon Okeefe (: 1955) is a 79 y.o. female, patient,here for evaluation of the following   Chief Complaint   Patient presents with    Foot Pain        ASSESSMENT/PLAN:  Below is the assessment and plan developed based on review of pertinent history, physical exam, labs, studies, and medications. 1. Arthritis of midtarsal joint of left foot  2. Left foot pain  -     XR STANDING FOOT LT MIN 3 V; Future      Patient is informed of findings on exam and x-rays reviewed. Her exam is consistent with midfoot arthritis focusing to the tarsometatarsal joint as well as the navicular cuneiform joint of the midfoot left foot. She has pain with increased activities, all started with walking activities and now she is sort of limited due to this problem. I did recommend starting with change of shoe wear and insoles, use of over-the-counter anti-inflammatory medications temporarily both oral and topical as needed. Also did offer a Medrol pack but patient would like to hold off on that medication. Provided information on types of shoes and insoles that would be helpful for this problem. If her symptoms do not improve over time, she may be candidate for 1 cortisone injection. Currently her arthritis is not severe enough for surgical treatment, I do recommend maximizing conservative treatment. I briefly discussed surgical treatments for this problem which is arthrodesis. Patient agrees with treatment plan. Return if symptoms worsen or fail to improve. Allergies   Allergen Reactions    Adhesive Rash       Current Outpatient Medications   Medication Sig    ALPRAZolam (XANAX) 0.25 mg tablet TAKE ONE TABLET BY MOUTH NIGHTLY AS NEEDED FOR ANXIETY ( MAX DAILY AMOUNT IS 1 TABLET)    hydroCHLOROthiazide (HYDRODIURIL) 25 mg tablet TAKE 1 TABLET BY MOUTH ONE TIME A DAY    olmesartan (BENICAR) 40 mg tablet TAKE 1 TABLET BY MOUTH ONCE DAILY    loratadine (CLARITIN) 10 mg tablet Take 10 mg by mouth.     BIOTIN PO Take 1,000 mcg by mouth daily. omeprazole (PRILOSEC) 20 mg capsule Take 20 mg by mouth every morning. acetaminophen (TYLENOL) 325 mg tablet Take 2 Tabs by mouth every six (6) hours as needed for Pain.    triamcinolone (NASACORT AQ) 55 mcg nasal inhaler 2 Sprays by Both Nostrils route nightly. cyanocobalamin 1,000 mcg tablet Take 1,000 mcg by mouth daily. MULTIVITS-MIN/FA/CA CARB/VIT K (ONE-A-DAY WOMEN'S 50+ PO) Take 1 Tab by mouth daily. No current facility-administered medications for this visit.        Past Medical History:   Diagnosis Date    Allergic rhinitis     Arrhythmia     occ pvc''s    Breast cancer ([facility] 2010    right breast    Breast cancer, left ([facility] 2020    LEFT breast DCIS    Cancer ([facility] 6/2010    right breast DCIS    Diverticulitis     Fracture     right wrist fx and surgery; pt fell     GERD (gastroesophageal reflux disease)     Hemorrhage, subarachnoid, traumatic 05/2020    Hypertension     Retinal tear of left eye 2/6/14       Past Surgical History:   Procedure Laterality Date    HX BILATERAL MASTECTOMY Bilateral 24/28/9130    Dr. Francheska Yu and Dr. Pérez Oscar Bilateral     HX BREAST BIOPSY Bilateral 1970 70's and 2010 benign (surgical)    HX BREAST BIOPSY Left 05/2010    benign mri bx    HX BREAST BIOPSY Right 03/2010    positive stereo    HX BREAST BIOPSY Left 07/2020    HX BREAST LUMPECTOMY  4/2010    right breast    HX BREAST RECONSTRUCTION Bilateral 10/22/2020    BILATERAL BREAST RECONSTRUCTION WITH DIRECT TO IMPLANT AND ALLODERM performed by Cornelia Hernandez MD at [facility]    HX COLONOSCOPY  2014    HX ORTHOPAEDIC  05/2020    ORIF RIGHT WRIST    HX OTHER SURGICAL Left 2/6/14     Retinal tear repair by Dr. Langford Fine TONSIL AND ADENOIDECTOMY      HX TONSILLECTOMY      HX WISDOM TEETH EXTRACTION         Family History   Problem Relation Age of Onset    Other Mother         overweight, gout    Heart Disease Mother         chf Hypertension Mother     Glaucoma Father     Hypertension Father     Osteoporosis Paternal Grandmother     Diabetes Paternal Aunt 36        breast cancer    Breast Cancer Paternal Aunt 36    No Known Problems Sister     Heart Disease Brother     Anesth Problems Neg Hx        Social History     Socioeconomic History    Marital status:      Spouse name: Not on file    Number of children: Not on file    Years of education: Not on file    Highest education level: Not on file   Occupational History    Not on file   Tobacco Use    Smoking status: Never    Smokeless tobacco: Never   Vaping Use    Vaping Use: Never used   Substance and Sexual Activity    Alcohol use: Yes     Alcohol/week: 3.0 standard drinks     Types: 3 Glasses of wine per week     Comment: 3/WK    Drug use: No    Sexual activity: Yes     Partners: Male   Other Topics Concern     Service No    Blood Transfusions No    Caffeine Concern No    Occupational Exposure No    Hobby Hazards No    Sleep Concern No    Stress Concern No    Weight Concern Yes    Special Diet No    Back Care No    Exercise No    Bike Helmet No    Seat Belt Yes    Self-Exams Yes   Social History Narrative    Not on file     Social Determinants of Health     Financial Resource Strain: Unknown    Difficulty of Paying Living Expenses: Patient refused   Food Insecurity: Unknown    Worried About Running Out of Food in the Last Year: Patient refused    Ran Out of Food in the Last Year: Patient refused   Transportation Needs: Not on file   Physical Activity: Not on file   Stress: Not on file   Social Connections: Not on file   Intimate Partner Violence: Not on file   Housing Stability: Not on file           Vitals:  Ht 5' 7\" (1.702 m)   Wt 207 lb (93.9 kg)   BMI 32.42 kg/m²    Body mass index is 32.42 kg/m².             SUBJECTIVE:  Butch Dong (: 1955)   New patient presents today with complaint of top of midfoot pain when walking and there is some swelling that happens. Is often sharp pain when first standing up and seems to be worse a month ago after exercising. She had walked about 3 to 4 miles when she started having pain that she had to stop walking. She also states the pain on the top of the left foot is worse when she sits for a while and gets up to walk and there is severe pain that is sharp comes and goes and there is often swelling. Standing, stairs/steps and walking make it worse. Symptoms unchanged despite taking Advil. Patient is not diabetic, non-smoker. OBJECTIVE EXAM:     Visit Vitals  Ht 5' 7\" (1.702 m)   Wt 207 lb (93.9 kg)   BMI 32.42 kg/m²       Appearance: Alert, well appearing and pleasant patient who is in no distress, oriented to person, place/time, and who follows commands. This patient is accompanied in the       office by her  self. Psychiatric: Affect and mood are appropriate. No dementia noted on examination  Musculoskeletal:  LOCATION: Diffuse tenderness midfoot joints foot - left      Integumentary: No rashes, skin patches, wounds, or abrasions to the right or left legs       Warm and normal color. No regions of expressible drainage. Gait: Normal      Tenderness: No tenderness        Motor/Strength/Tone Exam: Normal       Sensory Exam:   Intact Normal Sensation to ankle/foot      Stability Testing: No anterolateral or varus instability of the Ankle or Subtalar Joints               No peroneal tendon instability noted      ROM: Normal ROM noted to ankle/foot      Contractures: No Achilles or Gastrocnemius Contractures      Calf tenderness: Absent for calf or gastrocnemius muscle regions       Soft, supple, non tender, non taut lower extremity compartment  Alignment:      NEUTRAL Hindfoot,         none Metatarsus Adductus Metatarsus   Wounds/Abrasions:    None present  Extremities:   No embolic phenomena to the toes          No significant edema to the foot and or toes.         Lower extremities are warm and appear well perfused    DVT: No evidence of DVT seen on examination at this time     No calf swelling, no tenderness to calf muscles  Lymphatic:  No Evidence of Lymphedema  Vascular: Medial Border of Tibia Region: Edema is not present         Pulses: Dorsalis Pedis &  Posterior Tibial Pulses : Palpable yes        Varicosities Lower Limbs :  None  noted  Neuro: Negative bilateral Straight leg raise (seated position)    See Musculoskeletal section for pertinent individual extremity examination    No abnormal hand/wrist, foot/ankle, or facial/neck tremors. Lower Extremity/Ankle/Foot:  Mild antalgic gait, satisfactory weightbearing stance. Left lower extremity/ankle: Full active and passive range of motion intact, nontender, no swelling, ligaments grossly stable. Left foot: There is no malalignment or deformity that is severe, there is tenderness over the midfoot joints specifically the second and third tarsometatarsal joints, there is palpable bone spurs in the area that is also mildly tender with deep palpation. Hindfoot joints, metatarsals and toes nontender. Range of motion of hindfoot joints and toes intact. Contralateral lower extremity/ankle /foot exam:  Nontender, no swelling ligaments grossly stable. Normal weightbearing stance. Neurovascular exam is intact for light touch sensation, cap refill, flexion/extension toes and ankle strength 5/5. Imaging:    XR Results (most recent):  Results from Appointment encounter on 12/14/22    XR STANDING FOOT LT MIN 3 V    Narrative  Left foot AP, lateral and oblique standing x-rays show no acute fractures or dislocations, no acute abnormalities, there is significant arthritis notable on AP and oblique views at the second tarsometatarsal joint as well as the navicular cuneiform joints, lateral view also shows the same. There is decreased bone density. An electronic signature was used to authenticate this note.   -- Ambrose Hoover MD

## 2022-12-14 NOTE — LETTER
12/19/2022    Patient: Emani Ross   YOB: 1955   Date of Visit: 12/14/2022     Joseph Borges MD  [address]    Dear Joseph Borges MD,      Thank you for referring Ms. Alia Fisher to [facility] for evaluation. My notes for this consultation are attached. If you have questions, please do not hesitate to call me. I look forward to following your patient along with you.       Sincerely,    Blessing England MD

## 2023-02-19 DIAGNOSIS — F41.9 ANXIETY: ICD-10-CM

## 2023-02-20 RX ORDER — ALPRAZOLAM 0.25 MG/1
TABLET ORAL
Qty: 30 TABLET | Refills: 0 | Status: SHIPPED | OUTPATIENT
Start: 2023-02-20

## 2023-04-02 DIAGNOSIS — I10 ESSENTIAL HYPERTENSION: ICD-10-CM

## 2023-04-04 RX ORDER — HYDROCHLOROTHIAZIDE 25 MG/1
TABLET ORAL
Qty: 90 TABLET | Refills: 0 | Status: SHIPPED
Start: 2023-04-04

## 2023-04-20 DIAGNOSIS — M79.676 PAIN OF TOE, UNSPECIFIED LATERALITY: Primary | ICD-10-CM

## 2023-05-30 ENCOUNTER — OFFICE VISIT (OUTPATIENT)
Age: 68
End: 2023-05-30
Payer: COMMERCIAL

## 2023-05-30 VITALS
BODY MASS INDEX: 32.36 KG/M2 | DIASTOLIC BLOOD PRESSURE: 72 MMHG | SYSTOLIC BLOOD PRESSURE: 134 MMHG | RESPIRATION RATE: 16 BRPM | HEART RATE: 92 BPM | TEMPERATURE: 98 F | WEIGHT: 206.2 LBS | HEIGHT: 67 IN | OXYGEN SATURATION: 97 %

## 2023-05-30 DIAGNOSIS — R73.03 PREDIABETES: ICD-10-CM

## 2023-05-30 DIAGNOSIS — E78.5 HYPERLIPIDEMIA, UNSPECIFIED HYPERLIPIDEMIA TYPE: ICD-10-CM

## 2023-05-30 DIAGNOSIS — E66.9 OBESITY WITHOUT SERIOUS COMORBIDITY, UNSPECIFIED CLASSIFICATION, UNSPECIFIED OBESITY TYPE: ICD-10-CM

## 2023-05-30 DIAGNOSIS — M25.572 ARTHRALGIA OF LEFT FOOT: Primary | ICD-10-CM

## 2023-05-30 DIAGNOSIS — C50.911 MALIGNANT NEOPLASM OF RIGHT FEMALE BREAST, UNSPECIFIED ESTROGEN RECEPTOR STATUS, UNSPECIFIED SITE OF BREAST (HCC): ICD-10-CM

## 2023-05-30 DIAGNOSIS — I10 ESSENTIAL (PRIMARY) HYPERTENSION: ICD-10-CM

## 2023-05-30 PROBLEM — C50.919 MALIGNANT NEOPLASM OF BREAST (HCC): Status: ACTIVE | Noted: 2020-10-22

## 2023-05-30 PROCEDURE — 1123F ACP DISCUSS/DSCN MKR DOCD: CPT | Performed by: FAMILY MEDICINE

## 2023-05-30 PROCEDURE — 3074F SYST BP LT 130 MM HG: CPT | Performed by: FAMILY MEDICINE

## 2023-05-30 PROCEDURE — 3078F DIAST BP <80 MM HG: CPT | Performed by: FAMILY MEDICINE

## 2023-05-30 PROCEDURE — 99214 OFFICE O/P EST MOD 30 MIN: CPT | Performed by: FAMILY MEDICINE

## 2023-05-30 RX ORDER — TRAMADOL HYDROCHLORIDE 50 MG/1
50 TABLET ORAL 2 TIMES DAILY
Qty: 30 TABLET | Refills: 0 | Status: SHIPPED | OUTPATIENT
Start: 2023-05-30 | End: 2023-06-29

## 2023-05-30 SDOH — ECONOMIC STABILITY: FOOD INSECURITY: WITHIN THE PAST 12 MONTHS, YOU WORRIED THAT YOUR FOOD WOULD RUN OUT BEFORE YOU GOT MONEY TO BUY MORE.: NEVER TRUE

## 2023-05-30 SDOH — ECONOMIC STABILITY: FOOD INSECURITY: WITHIN THE PAST 12 MONTHS, THE FOOD YOU BOUGHT JUST DIDN'T LAST AND YOU DIDN'T HAVE MONEY TO GET MORE.: NEVER TRUE

## 2023-05-30 SDOH — ECONOMIC STABILITY: INCOME INSECURITY: HOW HARD IS IT FOR YOU TO PAY FOR THE VERY BASICS LIKE FOOD, HOUSING, MEDICAL CARE, AND HEATING?: NOT HARD AT ALL

## 2023-05-30 SDOH — ECONOMIC STABILITY: HOUSING INSECURITY
IN THE LAST 12 MONTHS, WAS THERE A TIME WHEN YOU DID NOT HAVE A STEADY PLACE TO SLEEP OR SLEPT IN A SHELTER (INCLUDING NOW)?: NO

## 2023-05-30 ASSESSMENT — PATIENT HEALTH QUESTIONNAIRE - PHQ9
SUM OF ALL RESPONSES TO PHQ9 QUESTIONS 1 & 2: 0
SUM OF ALL RESPONSES TO PHQ QUESTIONS 1-9: 0
2. FEELING DOWN, DEPRESSED OR HOPELESS: 0
1. LITTLE INTEREST OR PLEASURE IN DOING THINGS: 0

## 2023-05-30 ASSESSMENT — ENCOUNTER SYMPTOMS
ABDOMINAL PAIN: 0
SHORTNESS OF BREATH: 0

## 2023-05-30 NOTE — PROGRESS NOTES
Chief Complaint   Patient presents with    Diabetes    Hypertension    Follow-up Chronic Condition     1. \"Have you been to the ER, urgent care clinic since your last visit? Hospitalized since your last visit?\"     2. \"Have you seen or consulted any other health care providers outside of the [address] [place] [address] since your last visit? \"      Last A1c 04/24/2023      6.0

## 2023-05-30 NOTE — PROGRESS NOTES
Chief Complaint   Patient presents with    Diabetes    Hypertension    Follow-up Chronic Condition       /72 (Site: Left Upper Arm, Position: Sitting, Cuff Size: Medium Adult)   Pulse 92   Temp 98 °F (36.7 °C) (Temporal)   Resp 16   Ht 5' 7\" (1.702 m)   Wt 206 lb 3.2 oz (93.5 kg)   SpO2 97%   BMI 32.30 kg/m²     There are no preventive care reminders to display for this patient. 1. \"Have you been to the ER, urgent care clinic since your last visit? Hospitalized since your last visit? \" No    2. \"Have you seen or consulted any other health care providers outside of the [address] [place] [address] since your last visit? \" Yes Daniel molina      3. For patients aged 39-70: Has the patient had a colonoscopy / FIT/ Cologuard? Yes - no Care Gap present      If the patient is female:    4. For patients aged 41-77: Has the patient had a mammogram within the past 2 years? No      5. For patients aged 21-65: Has the patient had a pap smear?  NA - based on age or sex

## 2023-05-30 NOTE — PROGRESS NOTES
Subjective:      Patient ID: Patria Vinson is a 76 y.o. female. /72 (Site: Left Upper Arm, Position: Sitting, Cuff Size: Medium Adult)   Pulse 92   Temp 98 °F (36.7 °C) (Temporal)   Resp 16   Ht 5' 7\" (1.702 m)   Wt 206 lb 3.2 oz (93.5 kg)   SpO2 97%   BMI 32.30 kg/m²     presents today for   Chief Complaint   Patient presents with    Diabetes    Hypertension    Follow-up Chronic Condition               Diabetes  Pertinent negatives for hypoglycemia include no dizziness, headaches or nervousness/anxiousness. Pertinent negatives for diabetes include no chest pain and no fatigue. Hypertension  Pertinent negatives include no chest pain, headaches or shortness of breath. Patient is coming today for follow-up on her chronic conditions of diabetes hypertension hyperlipidemia. She says that she is doing good for the most part. She has been having ongoing arthralgia in the left foot she states that the pain at times can get bad but its tolerable. We discussed about some alternative medication bridges that are not nephrotoxic. Talked about giving her some tramadol prefer to take it on an as-needed basis only. Current Outpatient Medications   Medication Sig Dispense Refill    traMADol (ULTRAM) 50 MG tablet Take 1 tablet by mouth in the morning and at bedtime for 30 days.  Max Daily Amount: 100 mg 30 tablet 0    BIOTIN PO Take 1,000 mcg by mouth daily      acetaminophen (TYLENOL) 325 MG tablet Take 2 tablets by mouth every 6 hours as needed      ALPRAZolam (XANAX) 0.25 MG tablet TAKE ONE TABLET BY MOUTH NIGHTLY AS NEEDED FOR ANXIETY (MAX DAILY AMOUNT IS 0.25MG)      cyanocobalamin 1000 MCG tablet Take 1 tablet by mouth daily      hydroCHLOROthiazide (HYDRODIURIL) 25 MG tablet Take 1 tablet by mouth daily      loratadine (CLARITIN) 10 MG tablet Take 1 tablet by mouth      olmesartan (BENICAR) 40 MG tablet Take 1 tablet by mouth daily      omeprazole (PRILOSEC) 20 MG delayed release capsule

## 2023-06-26 DIAGNOSIS — F41.9 ANXIETY DISORDER, UNSPECIFIED TYPE: Primary | ICD-10-CM

## 2023-06-27 RX ORDER — HYDROCHLOROTHIAZIDE 25 MG/1
TABLET ORAL
Qty: 90 TABLET | Refills: 0 | Status: SHIPPED | OUTPATIENT
Start: 2023-06-27

## 2023-06-27 RX ORDER — OLMESARTAN MEDOXOMIL 40 MG/1
TABLET ORAL
Qty: 90 TABLET | Refills: 1 | Status: SHIPPED | OUTPATIENT
Start: 2023-06-27

## 2023-06-27 RX ORDER — ALPRAZOLAM 0.25 MG/1
TABLET ORAL
Qty: 30 TABLET | Refills: 0 | Status: SHIPPED | OUTPATIENT
Start: 2023-06-27 | End: 2023-07-27

## 2023-07-17 DIAGNOSIS — I10 ESSENTIAL (PRIMARY) HYPERTENSION: ICD-10-CM

## 2023-07-18 DIAGNOSIS — E87.1 LOW SODIUM LEVELS: Primary | ICD-10-CM

## 2023-07-18 LAB
ANION GAP SERPL CALC-SCNC: 6 MMOL/L (ref 5–15)
BUN SERPL-MCNC: 27 MG/DL (ref 6–20)
BUN/CREAT SERPL: 23 (ref 12–20)
CALCIUM SERPL-MCNC: 9.5 MG/DL (ref 8.5–10.1)
CHLORIDE SERPL-SCNC: 99 MMOL/L (ref 97–108)
CO2 SERPL-SCNC: 27 MMOL/L (ref 21–32)
CREAT SERPL-MCNC: 1.17 MG/DL (ref 0.55–1.02)
GLUCOSE SERPL-MCNC: 110 MG/DL (ref 65–100)
POTASSIUM SERPL-SCNC: 4.6 MMOL/L (ref 3.5–5.1)
SODIUM SERPL-SCNC: 132 MMOL/L (ref 136–145)

## 2023-08-31 DIAGNOSIS — F41.9 ANXIETY DISORDER, UNSPECIFIED TYPE: ICD-10-CM

## 2023-09-07 RX ORDER — ALPRAZOLAM 0.25 MG/1
TABLET ORAL
Qty: 30 TABLET | Refills: 0 | Status: SHIPPED | OUTPATIENT
Start: 2023-09-07 | End: 2023-10-07

## 2023-09-07 RX ORDER — ALPRAZOLAM 0.25 MG/1
TABLET ORAL
Qty: 30 TABLET | Refills: 0 | OUTPATIENT
Start: 2023-09-07

## 2023-11-08 DIAGNOSIS — F41.9 ANXIETY DISORDER, UNSPECIFIED TYPE: ICD-10-CM

## 2023-11-10 RX ORDER — ALPRAZOLAM 0.25 MG/1
TABLET ORAL
Qty: 30 TABLET | Refills: 0 | Status: SHIPPED | OUTPATIENT
Start: 2023-11-10 | End: 2023-12-10

## 2023-11-10 RX ORDER — HYDROCHLOROTHIAZIDE 25 MG/1
25 TABLET ORAL DAILY
Qty: 90 TABLET | Refills: 0 | Status: SHIPPED | OUTPATIENT
Start: 2023-11-10

## 2023-11-15 DIAGNOSIS — E87.1 LOW SODIUM LEVELS: ICD-10-CM

## 2023-11-16 LAB
ANION GAP SERPL CALC-SCNC: 8 MMOL/L (ref 5–15)
BUN SERPL-MCNC: 24 MG/DL (ref 6–20)
BUN/CREAT SERPL: 19 (ref 12–20)
CALCIUM SERPL-MCNC: 9.4 MG/DL (ref 8.5–10.1)
CHLORIDE SERPL-SCNC: 97 MMOL/L (ref 97–108)
CO2 SERPL-SCNC: 26 MMOL/L (ref 21–32)
CREAT SERPL-MCNC: 1.24 MG/DL (ref 0.55–1.02)
GLUCOSE SERPL-MCNC: 97 MG/DL (ref 65–100)
POTASSIUM SERPL-SCNC: 4.5 MMOL/L (ref 3.5–5.1)
SODIUM SERPL-SCNC: 131 MMOL/L (ref 136–145)

## 2023-12-04 ENCOUNTER — OFFICE VISIT (OUTPATIENT)
Age: 68
End: 2023-12-04
Payer: COMMERCIAL

## 2023-12-04 VITALS
OXYGEN SATURATION: 100 % | TEMPERATURE: 97.6 F | SYSTOLIC BLOOD PRESSURE: 150 MMHG | HEART RATE: 88 BPM | BODY MASS INDEX: 31.71 KG/M2 | WEIGHT: 202 LBS | RESPIRATION RATE: 16 BRPM | DIASTOLIC BLOOD PRESSURE: 80 MMHG | HEIGHT: 67 IN

## 2023-12-04 DIAGNOSIS — Z23 NEED FOR PNEUMOCOCCAL 20-VALENT CONJUGATE VACCINATION: Primary | ICD-10-CM

## 2023-12-04 DIAGNOSIS — R73.03 PREDIABETES: ICD-10-CM

## 2023-12-04 DIAGNOSIS — E79.0 ABNORMAL BLOOD LEVEL OF URIC ACID: ICD-10-CM

## 2023-12-04 DIAGNOSIS — I10 ESSENTIAL (PRIMARY) HYPERTENSION: ICD-10-CM

## 2023-12-04 DIAGNOSIS — E78.5 HYPERLIPIDEMIA, UNSPECIFIED HYPERLIPIDEMIA TYPE: ICD-10-CM

## 2023-12-04 DIAGNOSIS — R79.89 ELEVATED SERUM CREATININE: ICD-10-CM

## 2023-12-04 PROCEDURE — 90471 IMMUNIZATION ADMIN: CPT | Performed by: FAMILY MEDICINE

## 2023-12-04 PROCEDURE — 3078F DIAST BP <80 MM HG: CPT | Performed by: FAMILY MEDICINE

## 2023-12-04 PROCEDURE — 1123F ACP DISCUSS/DSCN MKR DOCD: CPT | Performed by: FAMILY MEDICINE

## 2023-12-04 PROCEDURE — 99214 OFFICE O/P EST MOD 30 MIN: CPT | Performed by: FAMILY MEDICINE

## 2023-12-04 PROCEDURE — 90677 PCV20 VACCINE IM: CPT | Performed by: FAMILY MEDICINE

## 2023-12-04 PROCEDURE — 3074F SYST BP LT 130 MM HG: CPT | Performed by: FAMILY MEDICINE

## 2023-12-04 RX ORDER — AMLODIPINE BESYLATE 5 MG/1
5 TABLET ORAL DAILY
Qty: 90 TABLET | Refills: 1 | Status: SHIPPED | OUTPATIENT
Start: 2023-12-04

## 2023-12-04 SDOH — ECONOMIC STABILITY: FOOD INSECURITY: WITHIN THE PAST 12 MONTHS, YOU WORRIED THAT YOUR FOOD WOULD RUN OUT BEFORE YOU GOT MONEY TO BUY MORE.: NEVER TRUE

## 2023-12-04 SDOH — ECONOMIC STABILITY: FOOD INSECURITY: WITHIN THE PAST 12 MONTHS, THE FOOD YOU BOUGHT JUST DIDN'T LAST AND YOU DIDN'T HAVE MONEY TO GET MORE.: NEVER TRUE

## 2023-12-04 SDOH — ECONOMIC STABILITY: INCOME INSECURITY: HOW HARD IS IT FOR YOU TO PAY FOR THE VERY BASICS LIKE FOOD, HOUSING, MEDICAL CARE, AND HEATING?: NOT HARD AT ALL

## 2023-12-04 ASSESSMENT — PATIENT HEALTH QUESTIONNAIRE - PHQ9
SUM OF ALL RESPONSES TO PHQ QUESTIONS 1-9: 0
1. LITTLE INTEREST OR PLEASURE IN DOING THINGS: 0
SUM OF ALL RESPONSES TO PHQ QUESTIONS 1-9: 0
2. FEELING DOWN, DEPRESSED OR HOPELESS: 0
SUM OF ALL RESPONSES TO PHQ9 QUESTIONS 1 & 2: 0
SUM OF ALL RESPONSES TO PHQ QUESTIONS 1-9: 0
SUM OF ALL RESPONSES TO PHQ QUESTIONS 1-9: 0

## 2023-12-04 ASSESSMENT — ENCOUNTER SYMPTOMS
VOMITING: 0
COUGH: 0
BACK PAIN: 0
SHORTNESS OF BREATH: 0
NAUSEA: 0

## 2023-12-04 NOTE — PROGRESS NOTES
Chief Complaint   Patient presents with    Hypertension    Gastroesophageal Reflux    Cholesterol Problem    Follow-up Chronic Condition     1. \"Have you been to the ER, urgent care clinic since your last visit? Hospitalized since your last visit? \"   Had Cataract surgery     2. \"Have you seen or consulted any other health care providers outside of the [address] [place] [address] since your last visit? \"  Eye /Cataract Surgery

## 2023-12-04 NOTE — PROGRESS NOTES
SHAHID  Guy Tadeo 76 y.o. female  presents to the office today for follow up on chronic conditions. There were no vitals taken for this visit. There is no height or weight on file to calculate BMI. Chief Complaint   Patient presents with    Hypertension    Gastroesophageal Reflux    Cholesterol Problem    Follow-up Chronic Condition      Hypertension: BP at office today 160/76 and 150/80 on manual recheck. She doesn't check BP at home. She is taking HCTZ 25mg daily and Benicar 40mg daily. She denies swelling in lower legs. Hyperlipidemia: Lipid panel on 4/28/23 notable for total cholesterol 214, HDL 80, .8, and triglycerides 56. She is not on statin therapy. Prediabetes: A1c has been stable at 6.0. Uric acid was elevated at 6.9 on 4/24/23. She doesn't eat a lot a red meat or drink alcohol. She thought she may have had gout, but was evaluated by Dr. Rosa M Moralez (ortho) who said what she was feeling in her feet was arthritis. She switched to [facility] shoes which significantly reduced her foot pain.       Health Maintenance:   Flu Vaccine: 11/2023  COVID Booster: 11/2023  RSV Vaccine: she is planning to get  [id number]: given today     Current Outpatient Medications   Medication Sig Dispense Refill    hydroCHLOROthiazide (HYDRODIURIL) 25 MG tablet TAKE ONE TABLET BY MOUTH ONCE A DAY 90 tablet 0    ALPRAZolam (XANAX) 0.25 MG tablet TAKE ONE TABLET BY MOUTH EVERY NIGHT AS NEEDED FOR ANXIETY 30 tablet 0    olmesartan (BENICAR) 40 MG tablet TAKE ONE TABLET BY MOUTH ONE TIME A DAY 90 tablet 1    BIOTIN PO Take 1,000 mcg by mouth daily      acetaminophen (TYLENOL) 325 MG tablet Take 2 tablets by mouth every 6 hours as needed      cyanocobalamin 1000 MCG tablet Take 1 tablet by mouth daily      loratadine (CLARITIN) 10 MG tablet Take 1 tablet by mouth      omeprazole (PRILOSEC) 20 MG delayed release capsule Take 1 capsule by mouth      triamcinolone (NASACORT) 55 MCG/ACT nasal inhaler 2 sprays by Nasal route

## 2023-12-05 ENCOUNTER — HOSPITAL ENCOUNTER (OUTPATIENT)
Facility: HOSPITAL | Age: 68
Discharge: HOME | End: 2023-12-08
Attending: FAMILY MEDICINE
Payer: COMMERCIAL

## 2023-12-05 DIAGNOSIS — R79.89 ELEVATED SERUM CREATININE: ICD-10-CM

## 2023-12-05 DIAGNOSIS — I10 ESSENTIAL (PRIMARY) HYPERTENSION: ICD-10-CM

## 2023-12-05 DIAGNOSIS — R73.03 PREDIABETES: ICD-10-CM

## 2023-12-05 DIAGNOSIS — E79.0 ABNORMAL BLOOD LEVEL OF URIC ACID: ICD-10-CM

## 2023-12-05 DIAGNOSIS — E78.5 HYPERLIPIDEMIA, UNSPECIFIED HYPERLIPIDEMIA TYPE: ICD-10-CM

## 2023-12-05 PROCEDURE — 76770 US EXAM ABDO BACK WALL COMP: CPT

## 2023-12-06 LAB
ALBUMIN SERPL-MCNC: 3.8 G/DL (ref 3.5–5)
ALBUMIN/GLOB SERPL: 1.1 (ref 1.1–2.2)
ALP SERPL-CCNC: 101 U/L (ref 45–117)
ALT SERPL-CCNC: 26 U/L (ref 12–78)
ANION GAP SERPL CALC-SCNC: 7 MMOL/L (ref 5–15)
AST SERPL-CCNC: 16 U/L (ref 15–37)
BILIRUB SERPL-MCNC: 0.3 MG/DL (ref 0.2–1)
BUN SERPL-MCNC: 24 MG/DL (ref 6–20)
BUN/CREAT SERPL: 20 (ref 12–20)
CALCIUM SERPL-MCNC: 9.9 MG/DL (ref 8.5–10.1)
CHLORIDE SERPL-SCNC: 100 MMOL/L (ref 97–108)
CHOLEST SERPL-MCNC: 236 MG/DL
CO2 SERPL-SCNC: 29 MMOL/L (ref 21–32)
CREAT SERPL-MCNC: 1.19 MG/DL (ref 0.55–1.02)
EST. AVERAGE GLUCOSE BLD GHB EST-MCNC: 117 MG/DL
GLOBULIN SER CALC-MCNC: 3.6 G/DL (ref 2–4)
GLUCOSE SERPL-MCNC: 135 MG/DL (ref 65–100)
HBA1C MFR BLD: 5.7 % (ref 4–5.6)
HDLC SERPL-MCNC: 86 MG/DL
HDLC SERPL: 2.7 (ref 0–5)
LDLC SERPL CALC-MCNC: 131.4 MG/DL (ref 0–100)
POTASSIUM SERPL-SCNC: 4.8 MMOL/L (ref 3.5–5.1)
PROT SERPL-MCNC: 7.4 G/DL (ref 6.4–8.2)
SODIUM SERPL-SCNC: 136 MMOL/L (ref 136–145)
TRIGL SERPL-MCNC: 93 MG/DL
URATE SERPL-MCNC: 7.1 MG/DL (ref 2.6–6)
VLDLC SERPL CALC-MCNC: 18.6 MG/DL

## 2023-12-27 RX ORDER — OLMESARTAN MEDOXOMIL 40 MG/1
40 TABLET ORAL DAILY
Qty: 90 TABLET | Refills: 1 | Status: SHIPPED | OUTPATIENT
Start: 2023-12-27

## 2024-01-03 ENCOUNTER — TELEPHONE (OUTPATIENT)
Age: 69
End: 2024-01-03

## 2024-01-03 NOTE — TELEPHONE ENCOUNTER
TC to Patient. ID verified.   Patient advises she has been sick since about the 26th     Low grade fever, terrible cough, 2 days in bed unable to do anything. Took covid test x 2 Negative.      Worse sx is the cough.     Patient advised to contact [facility] may have flu or Bronchitis. Needs to have provider assess her.     Patient agrees to plan.

## 2024-01-03 NOTE — TELEPHONE ENCOUNTER
Patient called saying she has been having some respiratory issues for about 6 days. Patient is not too sure about what it is, and would like to speak with nurse to be seen by PCP. Patient also stated her  not too long after her, is now experiencing the same issue. She also stated she sent PCP nurse a message on [employer].   Best call back #: [phone]

## 2024-02-05 DIAGNOSIS — F41.9 ANXIETY DISORDER, UNSPECIFIED TYPE: ICD-10-CM

## 2024-02-07 RX ORDER — ALPRAZOLAM 0.25 MG/1
TABLET ORAL
Qty: 30 TABLET | Refills: 0 | Status: SHIPPED | OUTPATIENT
Start: 2024-02-07 | End: 2024-03-08

## 2024-02-07 NOTE — TELEPHONE ENCOUNTER
Refill request for alprazolam. Check .  Cami, Mireya    Last appointment: 12/4/23 [facility]  Next appointment: 6/4/24 [facility]  Previous refill encounter(s): 11/10/23 30    Requested Prescriptions     Pending Prescriptions Disp Refills    ALPRAZolam (XANAX) 0.25 MG tablet [Pharmacy Med Name: ALPRAZOLAM 0.25MG TABS] 30 tablet 0     Sig: TAKE ONE TABLET BY MOUTH EVERY NIGHT AS NEEDED FOR ANXIETY     For Pharmacy Admin Tracking Only    Program: Medication Refill  CPA in place:    Recommendation Provided To:   Intervention Detail: New Rx: 1, reason: Patient Preference  Intervention Accepted By:   Gap Closed?:    Time Spent (min): 5

## 2024-02-07 NOTE — PROGRESS NOTES
Needs to be checking blood pressure more often sent B/P log.     Order's placed per  Verbal Order.

## 2024-04-03 DIAGNOSIS — F41.9 ANXIETY DISORDER, UNSPECIFIED TYPE: ICD-10-CM

## 2024-04-04 NOTE — TELEPHONE ENCOUNTER
PCP: Jb Tineo MD    Last appt: 12/4/2023       Future Appointments   Date Time Provider Department Center   6/4/2024  2:45 PM Jb Tineo MD [facility]       Requested Prescriptions     Pending Prescriptions Disp Refills    ALPRAZolam (XANAX) 0.25 MG tablet [Pharmacy Med Name: ALPRAZOLAM 0.25MG TABS] 30 tablet 0     Sig: TAKE ONE TABLET BY MOUTH EVERY NIGHT AS NEEDED FOR ANXIETY       Prior labs and Blood pressures:  BP Readings from Last 3 Encounters:   12/04/23 (!) 150/80   05/30/23 134/72   11/28/22 139/79     Lab Results   Component Value Date/Time     12/05/2023 04:04 PM    K 4.8 12/05/2023 04:04 PM     12/05/2023 04:04 PM    CO2 29 12/05/2023 04:04 PM    BUN 24 12/05/2023 04:04 PM    GFRAA 57 05/24/2022 03:48 PM     Lab Results   Component Value Date/Time    [id number] 6.0 04/12/2021 03:26 PM     Lab Results   Component Value Date/Time    CHOL 236 12/05/2023 04:04 PM    HDL 86 12/05/2023 04:04 PM     No results found for: \"VITD3\", \"VD3RIA\"    No results found for: \"TSH\", \"TSH2\", \"TSH3\"

## 2024-04-05 RX ORDER — ALPRAZOLAM 0.25 MG/1
TABLET ORAL
Qty: 30 TABLET | Refills: 0 | Status: SHIPPED | OUTPATIENT
Start: 2024-04-05 | End: 2024-05-05

## 2024-05-17 DIAGNOSIS — I10 ESSENTIAL (PRIMARY) HYPERTENSION: ICD-10-CM

## 2024-05-17 NOTE — TELEPHONE ENCOUNTER
Last appointment: 12/4/23 [facility]  Next appointment: 6/4/24 [facility]  Previous refill encounter(s):   Amlodipine 12/27/23 90 + 1  Olmesartan 12/4/23 90 + 1    Requested Prescriptions     Pending Prescriptions Disp Refills    amLODIPine (NORVASC) 5 MG tablet 90 tablet 1     Sig: Take 1 tablet by mouth daily    olmesartan (BENICAR) 40 MG tablet 90 tablet 1     Sig: Take 1 tablet by mouth daily     For Pharmacy Admin Tracking Only    Program: Medication Refill  CPA in place:    Recommendation Provided To:   Intervention Detail: New Rx: 2, reason: Patient Preference  Intervention Accepted By:   Gap Closed?:    Time Spent (min): 5

## 2024-05-19 RX ORDER — OLMESARTAN MEDOXOMIL 40 MG/1
40 TABLET ORAL DAILY
Qty: 90 TABLET | Refills: 1 | Status: SHIPPED | OUTPATIENT
Start: 2024-05-19

## 2024-05-19 RX ORDER — AMLODIPINE BESYLATE 5 MG/1
5 TABLET ORAL DAILY
Qty: 90 TABLET | Refills: 1 | Status: SHIPPED | OUTPATIENT
Start: 2024-05-19

## 2024-06-04 ENCOUNTER — OFFICE VISIT (OUTPATIENT)
Age: 69
End: 2024-06-04
Payer: COMMERCIAL

## 2024-06-04 VITALS
WEIGHT: 209 LBS | SYSTOLIC BLOOD PRESSURE: 134 MMHG | DIASTOLIC BLOOD PRESSURE: 71 MMHG | BODY MASS INDEX: 32.8 KG/M2 | HEART RATE: 88 BPM | TEMPERATURE: 97.7 F | HEIGHT: 67 IN | OXYGEN SATURATION: 98 % | RESPIRATION RATE: 16 BRPM

## 2024-06-04 DIAGNOSIS — N18.31 STAGE 3A CHRONIC KIDNEY DISEASE (HCC): ICD-10-CM

## 2024-06-04 DIAGNOSIS — C50.911 MALIGNANT NEOPLASM OF RIGHT FEMALE BREAST, UNSPECIFIED ESTROGEN RECEPTOR STATUS, UNSPECIFIED SITE OF BREAST (HCC): ICD-10-CM

## 2024-06-04 DIAGNOSIS — R73.03 PREDIABETES: ICD-10-CM

## 2024-06-04 DIAGNOSIS — F51.01 PRIMARY INSOMNIA: ICD-10-CM

## 2024-06-04 DIAGNOSIS — E79.0 ELEVATED URIC ACID IN BLOOD: ICD-10-CM

## 2024-06-04 DIAGNOSIS — E78.5 HYPERLIPIDEMIA WITH TARGET LDL LESS THAN 130: ICD-10-CM

## 2024-06-04 DIAGNOSIS — E83.52 HYPERCALCEMIA: ICD-10-CM

## 2024-06-04 DIAGNOSIS — I10 ESSENTIAL HYPERTENSION: Primary | ICD-10-CM

## 2024-06-04 PROBLEM — S06.6X1A TRAUMATIC SUBARACHNOID BLEED WITH LOC OF 30 MINUTES OR LESS (HCC): Status: RESOLVED | Noted: 2020-05-26 | Resolved: 2024-06-04

## 2024-06-04 PROCEDURE — 99214 OFFICE O/P EST MOD 30 MIN: CPT | Performed by: FAMILY MEDICINE

## 2024-06-04 PROCEDURE — 3078F DIAST BP <80 MM HG: CPT | Performed by: FAMILY MEDICINE

## 2024-06-04 PROCEDURE — 1123F ACP DISCUSS/DSCN MKR DOCD: CPT | Performed by: FAMILY MEDICINE

## 2024-06-04 PROCEDURE — 3075F SYST BP GE 130 - 139MM HG: CPT | Performed by: FAMILY MEDICINE

## 2024-06-04 RX ORDER — DOXEPIN HYDROCHLORIDE 10 MG/1
10 CAPSULE ORAL NIGHTLY
Qty: 30 CAPSULE | Refills: 5 | Status: SHIPPED | OUTPATIENT
Start: 2024-06-04

## 2024-06-04 RX ORDER — ALPRAZOLAM 0.25 MG/1
0.25 TABLET ORAL NIGHTLY PRN
COMMUNITY

## 2024-06-04 SDOH — ECONOMIC STABILITY: FOOD INSECURITY: WITHIN THE PAST 12 MONTHS, YOU WORRIED THAT YOUR FOOD WOULD RUN OUT BEFORE YOU GOT MONEY TO BUY MORE.: NEVER TRUE

## 2024-06-04 SDOH — ECONOMIC STABILITY: INCOME INSECURITY: HOW HARD IS IT FOR YOU TO PAY FOR THE VERY BASICS LIKE FOOD, HOUSING, MEDICAL CARE, AND HEATING?: NOT HARD AT ALL

## 2024-06-04 SDOH — ECONOMIC STABILITY: FOOD INSECURITY: WITHIN THE PAST 12 MONTHS, THE FOOD YOU BOUGHT JUST DIDN'T LAST AND YOU DIDN'T HAVE MONEY TO GET MORE.: NEVER TRUE

## 2024-06-04 ASSESSMENT — ENCOUNTER SYMPTOMS
NAUSEA: 0
VOMITING: 0
COUGH: 0
SHORTNESS OF BREATH: 0
BACK PAIN: 0

## 2024-06-04 ASSESSMENT — PATIENT HEALTH QUESTIONNAIRE - PHQ9
SUM OF ALL RESPONSES TO PHQ QUESTIONS 1-9: 0
SUM OF ALL RESPONSES TO PHQ9 QUESTIONS 1 & 2: 0
2. FEELING DOWN, DEPRESSED OR HOPELESS: NOT AT ALL
SUM OF ALL RESPONSES TO PHQ QUESTIONS 1-9: 0
1. LITTLE INTEREST OR PLEASURE IN DOING THINGS: NOT AT ALL
SUM OF ALL RESPONSES TO PHQ QUESTIONS 1-9: 0
SUM OF ALL RESPONSES TO PHQ QUESTIONS 1-9: 0

## 2024-06-04 NOTE — PROGRESS NOTES
SHAHID Berrydayday Coles 69 y.o. female  presents to the office today for follow up on chronic conditions.     Blood pressure 134/71, pulse 88, temperature 97.7 °F (36.5 °C), temperature source Temporal, resp. rate 16, height 1.702 m (5' 7\"), weight 94.8 kg (209 lb), SpO2 98 %. Body mass index is 32.73 kg/m².   Chief Complaint   Patient presents with    Hypertension    Cholesterol Problem    Other     Pre diabetes        She reports some recent life challenges. She is looking forward to an upcoming vacation to help unwind a bit.     She takes xanax 0.25mg as needed to help her sleep. She would like to get more frequent refills if possible. She reports trouble with \"shutting her brain off.\" She has trouble with initiating and maintaining sleep. This is isn't every night. On a good night, she falls asleep in 10-15 minutes. On a bad night, she reports it feels like she never falls asleep. She denies feeling drowsy after taking xanax to help sleep.     She no longer needs to follow up with breast surgeon, Dr. Hernandez, but continues to follow up with plastic surgeon every 2 years.     Hypertension: BP at office today 134/71. Pt continues with amlodipine 5mg daily and Benicar 40mg daily.     Hyperlipidemia: Lipid panel on 12/5/23 notable for total cholesterol 236, HDL 86, .4, and triglycerides 93. She is not on statin therapy.     Prediabetes: A1c was 5.7 on 12/5/23.     Stage 3a Chronic Kidney Disease (CKD): Pt's last lab values on 12/5/23, BUN: 24, Creatinine: 1.19, BUN/Creatinine Ratio: 20, and GFR 50. She saw nephrologist, Dr. Henderson who recommended monitoring salt consumption and to work on weight loss.     Health Maintenance:   RSV Vaccine: planning to get    Current Outpatient Medications   Medication Sig Dispense Refill    ALPRAZolam (XANAX) 0.25 MG tablet Take 1 tablet by mouth nightly as needed for Sleep. Max Daily Amount: 0.25 mg      doxepin (SINEQUAN) 10 MG capsule Take 1 capsule by mouth nightly 30

## 2024-06-04 NOTE — PROGRESS NOTES
Chief Complaint   Patient presents with    Hypertension    Cholesterol Problem    Other     Pre diabetes       \"Have you been to the ER, urgent care clinic since your last visit?  Hospitalized since your last visit?\"    no    “Have you seen or consulted any other health care providers outside of [facility] since your last visit?”    Derm - Basal cell on fore head             Click Here for Release of Records Request

## 2024-06-04 NOTE — PROGRESS NOTES
Alyse Coles 69 y.o. female  presents to the office today ***    Blood pressure 134/71, pulse 88, temperature 97.7 °F (36.5 °C), temperature source Temporal, resp. rate 16, height 1.702 m (5' 7\"), weight 94.8 kg (209 lb), SpO2 98 %. Body mass index is 32.73 kg/m².   Chief Complaint   Patient presents with    Hypertension    Cholesterol Problem    Other     Pre diabetes          History of Present Illness  The patient is a 69-year-old female who presents for evaluation of multiple medical concerns.    The patient is seeking an increase in her Xanax prescription, quantity of 30 tablets every 3 months, to aid in sleep. She reports difficulty initiating sleep, often contemplating sleep onset, and frequent nocturnal awakenings. On a good night, she achieves sleep within 10 to 15 minutes, however, on a sleepless night, she experiences difficulty initiating sleep. Morning drowsiness is not reported post-medication. She has not yet tried doxepin.    The patient consults with a plastic surgeon biennially, with her next appointment scheduled for 01/2025. She underwent a mastectomy performed by Dr. Bah.    The patient consulted with Dr. Henderson, a nephrologist, who diagnosed her with chronic kidney disease. She was advised to monitor her salt intake, engage in more physical exercise, and schedule a follow-up visit in 6 months.      Current Outpatient Medications   Medication Sig Dispense Refill    ALPRAZolam (XANAX) 0.25 MG tablet Take 1 tablet by mouth nightly as needed for Sleep. Max Daily Amount: 0.25 mg      doxepin (SINEQUAN) 10 MG capsule Take 1 capsule by mouth nightly 30 capsule 5    amLODIPine (NORVASC) 5 MG tablet Take 1 tablet by mouth daily 90 tablet 1    olmesartan (BENICAR) 40 MG tablet Take 1 tablet by mouth daily 90 tablet 1    BIOTIN PO Take 1,000 mcg by mouth daily      acetaminophen (TYLENOL) 325 MG tablet Take 2 tablets by mouth every 6 hours as needed      cyanocobalamin 1000 MCG tablet Take 1

## 2024-06-05 LAB
ALBUMIN SERPL-MCNC: 4.2 G/DL (ref 3.5–5)
ALBUMIN/GLOB SERPL: 1.2 (ref 1.1–2.2)
ALP SERPL-CCNC: 128 U/L (ref 45–117)
ALT SERPL-CCNC: 28 U/L (ref 12–78)
ANION GAP SERPL CALC-SCNC: 9 MMOL/L (ref 5–15)
AST SERPL-CCNC: 20 U/L (ref 15–37)
BILIRUB SERPL-MCNC: 0.5 MG/DL (ref 0.2–1)
BUN SERPL-MCNC: 18 MG/DL (ref 6–20)
BUN/CREAT SERPL: 17 (ref 12–20)
CALCIUM SERPL-MCNC: 10.7 MG/DL (ref 8.5–10.1)
CHLORIDE SERPL-SCNC: 104 MMOL/L (ref 97–108)
CHOLEST SERPL-MCNC: 232 MG/DL
CO2 SERPL-SCNC: 26 MMOL/L (ref 21–32)
CREAT SERPL-MCNC: 1.06 MG/DL (ref 0.55–1.02)
ERYTHROCYTE [DISTWIDTH] IN BLOOD BY AUTOMATED COUNT: 13.2 % (ref 11.5–14.5)
EST. AVERAGE GLUCOSE BLD GHB EST-MCNC: 126 MG/DL
GLOBULIN SER CALC-MCNC: 3.6 G/DL (ref 2–4)
GLUCOSE SERPL-MCNC: 106 MG/DL (ref 65–100)
HBA1C MFR BLD: 6 % (ref 4–5.6)
HCT VFR BLD AUTO: 38.5 % (ref 35–47)
HDLC SERPL-MCNC: 93 MG/DL
HDLC SERPL: 2.5 (ref 0–5)
HGB BLD-MCNC: 12.4 G/DL (ref 11.5–16)
LDLC SERPL CALC-MCNC: 125 MG/DL (ref 0–100)
MCH RBC QN AUTO: 28.1 PG (ref 26–34)
MCHC RBC AUTO-ENTMCNC: 32.2 G/DL (ref 30–36.5)
MCV RBC AUTO: 87.3 FL (ref 80–99)
NRBC # BLD: 0 K/UL (ref 0–0.01)
NRBC BLD-RTO: 0 PER 100 WBC
PLATELET # BLD AUTO: 410 K/UL (ref 150–400)
PMV BLD AUTO: 9.8 FL (ref 8.9–12.9)
POTASSIUM SERPL-SCNC: 5 MMOL/L (ref 3.5–5.1)
PROT SERPL-MCNC: 7.8 G/DL (ref 6.4–8.2)
RBC # BLD AUTO: 4.41 M/UL (ref 3.8–5.2)
SODIUM SERPL-SCNC: 139 MMOL/L (ref 136–145)
TRIGL SERPL-MCNC: 70 MG/DL
URATE SERPL-MCNC: 6.2 MG/DL (ref 2.6–6)
VLDLC SERPL CALC-MCNC: 14 MG/DL
WBC # BLD AUTO: 12.7 K/UL (ref 3.6–11)

## 2024-06-06 NOTE — RESULT ENCOUNTER NOTE
Alyse,  You have been under tremendous amount of stress as we discussed.  A1c is gone up to 6% when I try to get that less than 5.7% when I see you again we will recheck.    Kidney function liver function are stable but the only thing I noted was an elevated calcium level I want to do a calcium panel on you for that you will need additional labs I would not advise you to fast for that and just come in so we can get that checked out.    Your white count was slightly elevated again to I want to go ahead and reassess this again.    Lipid panel is stable.    Uric acid levels actually look much better keep up the good work.    If you have any questions let me know

## 2024-08-23 ENCOUNTER — PATIENT MESSAGE (OUTPATIENT)
Age: 69
End: 2024-08-23

## 2024-08-23 DIAGNOSIS — I10 ESSENTIAL (PRIMARY) HYPERTENSION: ICD-10-CM

## 2024-08-30 RX ORDER — OLMESARTAN MEDOXOMIL 40 MG/1
40 TABLET ORAL DAILY
Qty: 90 TABLET | Refills: 1 | Status: SHIPPED | OUTPATIENT
Start: 2024-08-30

## 2024-08-30 RX ORDER — AMLODIPINE BESYLATE 5 MG/1
5 TABLET ORAL DAILY
Qty: 90 TABLET | Refills: 1 | Status: SHIPPED | OUTPATIENT
Start: 2024-08-30

## 2025-02-21 DIAGNOSIS — I10 ESSENTIAL (PRIMARY) HYPERTENSION: ICD-10-CM

## 2025-02-21 RX ORDER — AMLODIPINE BESYLATE 5 MG/1
5 TABLET ORAL DAILY
Qty: 90 TABLET | Refills: 1 | Status: SHIPPED | OUTPATIENT
Start: 2025-02-21

## 2025-02-21 NOTE — TELEPHONE ENCOUNTER
PCP: Jb Tineo MD    Last appt: 6/4/2024   No future appointments.    Requested Prescriptions     Pending Prescriptions Disp Refills    amLODIPine (NORVASC) 5 MG tablet [Pharmacy Med Name: AMLODIPINE BESYLATE TABS 5MG] 90 tablet 3     Sig: TAKE 1 TABLET DAILY         Prior labs and Blood pressures:  BP Readings from Last 3 Encounters:   06/04/24 134/71   12/04/23 (!) 150/80   05/30/23 134/72     Lab Results   Component Value Date/Time     06/04/2024 04:01 PM    K 5.0 06/04/2024 04:01 PM     06/04/2024 04:01 PM    CO2 26 06/04/2024 04:01 PM    BUN 18 06/04/2024 04:01 PM    GFRAA 57 05/24/2022 03:48 PM     Lab Results   Component Value Date/Time    [id number] 6.0 04/12/2021 03:26 PM     Lab Results   Component Value Date/Time    CHOL 232 06/04/2024 04:01 PM    HDL 93 06/04/2024 04:01 PM     06/04/2024 04:01 PM    .4 12/05/2023 04:04 PM    VLDL 14 06/04/2024 04:01 PM     No results found for: \"VITD3\"    No results found for: \"TSH\", \"TSH2\", \"TSH3\"

## 2025-03-27 DIAGNOSIS — Z01.84 IMMUNITY STATUS TESTING: Primary | ICD-10-CM

## 2025-04-02 DIAGNOSIS — Z01.84 IMMUNITY STATUS TESTING: ICD-10-CM

## 2025-04-02 DIAGNOSIS — E83.52 HYPERCALCEMIA: ICD-10-CM

## 2025-04-03 LAB
25(OH)D3 SERPL-MCNC: 44.8 NG/ML (ref 30–100)
ANION GAP SERPL CALC-SCNC: 5 MMOL/L (ref 2–12)
BASOPHILS # BLD: 0.01 K/UL (ref 0–0.1)
BASOPHILS NFR BLD: 0.1 % (ref 0–1)
BUN SERPL-MCNC: 14 MG/DL (ref 6–20)
BUN/CREAT SERPL: 14 (ref 12–20)
CALCIUM SERPL-MCNC: 10.1 MG/DL (ref 8.5–10.1)
CALCIUM SERPL-MCNC: 9.8 MG/DL (ref 8.5–10.1)
CHLORIDE SERPL-SCNC: 104 MMOL/L (ref 97–108)
CO2 SERPL-SCNC: 29 MMOL/L (ref 21–32)
CREAT SERPL-MCNC: 1 MG/DL (ref 0.55–1.02)
DIFFERENTIAL METHOD BLD: ABNORMAL
EOSINOPHIL # BLD: 0.5 K/UL (ref 0–0.4)
EOSINOPHIL NFR BLD: 4.8 % (ref 0–7)
ERYTHROCYTE [DISTWIDTH] IN BLOOD BY AUTOMATED COUNT: 13.7 % (ref 11.5–14.5)
GLUCOSE SERPL-MCNC: 117 MG/DL (ref 65–100)
HCT VFR BLD AUTO: 38.8 % (ref 35–47)
HGB BLD-MCNC: 12 G/DL (ref 11.5–16)
IMM GRANULOCYTES # BLD AUTO: 0.03 K/UL (ref 0–0.04)
IMM GRANULOCYTES NFR BLD AUTO: 0.3 % (ref 0–0.5)
LYMPHOCYTES # BLD: 2.65 K/UL (ref 0.8–3.5)
LYMPHOCYTES NFR BLD: 25.3 % (ref 12–49)
MCH RBC QN AUTO: 27 PG (ref 26–34)
MCHC RBC AUTO-ENTMCNC: 30.9 G/DL (ref 30–36.5)
MCV RBC AUTO: 87.4 FL (ref 80–99)
MONOCYTES # BLD: 0.56 K/UL (ref 0–1)
MONOCYTES NFR BLD: 5.3 % (ref 5–13)
NEUTS SEG # BLD: 6.72 K/UL (ref 1.8–8)
NEUTS SEG NFR BLD: 64.2 % (ref 32–75)
NRBC # BLD: 0 K/UL (ref 0–0.01)
NRBC BLD-RTO: 0 PER 100 WBC
PERIPHERAL SMEAR, MD REVIEW: NORMAL
PLATELET # BLD AUTO: 454 K/UL (ref 150–400)
PMV BLD AUTO: 10.1 FL (ref 8.9–12.9)
POTASSIUM SERPL-SCNC: 4.3 MMOL/L (ref 3.5–5.1)
PTH-INTACT SERPL-MCNC: 69.1 PG/ML (ref 18.4–88)
RBC # BLD AUTO: 4.44 M/UL (ref 3.8–5.2)
SODIUM SERPL-SCNC: 138 MMOL/L (ref 136–145)
TSH SERPL DL<=0.05 MIU/L-ACNC: 2.34 UIU/ML (ref 0.36–3.74)
WBC # BLD AUTO: 10.5 K/UL (ref 3.6–11)

## 2025-04-04 LAB
MEV IGG SER IA-ACNC: >300 AU/ML
MUV IGG SER IA-ACNC: >300 AU/ML
RUBV IGG SERPL IA-ACNC: 19.3 INDEX

## 2025-04-07 LAB
ALBUMIN SERPL ELPH-MCNC: 3.3 G/DL (ref 2.9–4.4)
ALBUMIN/GLOB SERPL: 1 (ref 0.7–1.7)
ALPHA1 GLOB SERPL ELPH-MCNC: 0.3 G/DL (ref 0–0.4)
ALPHA2 GLOB SERPL ELPH-MCNC: 0.9 G/DL (ref 0.4–1)
B-GLOBULIN SERPL ELPH-MCNC: 1.2 G/DL (ref 0.7–1.3)
GAMMA GLOB SERPL ELPH-MCNC: 1 G/DL (ref 0.4–1.8)
GLOBULIN SER CALC-MCNC: 3.4 G/DL (ref 2.2–3.9)
M PROTEIN SERPL ELPH-MCNC: NORMAL G/DL
PROT SERPL-MCNC: 6.7 G/DL (ref 6–8.5)

## 2025-04-11 LAB — PTH RELATED PROT SERPL-SCNC: <2 PMOL/L

## 2025-04-11 NOTE — TELEPHONE ENCOUNTER
Last appointment: 6/4/24 Noman  Next appointment: 8/5/25 [facility]oman  Previous refill encounter(s): 8/30/24 90 + 1    Requested Prescriptions     Pending Prescriptions Disp Refills    olmesartan (BENICAR) 40 MG tablet [Pharmacy Med Name: OLMESARTAN MEDOXOMIL TABS 40MG] 90 tablet 1     Sig: Take 1 tablet by mouth daily     For Pharmacy Admin Tracking Only    Program: Medication Refill  CPA in place:    Recommendation Provided To:   Intervention Detail: New Rx: 1, reason: Patient Preference  Intervention Accepted By:   Gap Closed?:    Time Spent (min): 5

## 2025-04-14 RX ORDER — OLMESARTAN MEDOXOMIL 40 MG/1
40 TABLET ORAL DAILY
Qty: 90 TABLET | Refills: 1 | OUTPATIENT
Start: 2025-04-14

## 2025-04-14 RX ORDER — OLMESARTAN MEDOXOMIL 40 MG/1
40 TABLET ORAL DAILY
Qty: 90 TABLET | Refills: 1 | Status: SHIPPED | OUTPATIENT
Start: 2025-04-14

## 2025-04-16 ENCOUNTER — RESULTS FOLLOW-UP (OUTPATIENT)
Age: 70
End: 2025-04-16

## 2025-08-03 SDOH — ECONOMIC STABILITY: FOOD INSECURITY: WITHIN THE PAST 12 MONTHS, YOU WORRIED THAT YOUR FOOD WOULD RUN OUT BEFORE YOU GOT MONEY TO BUY MORE.: NEVER TRUE

## 2025-08-03 SDOH — ECONOMIC STABILITY: FOOD INSECURITY: WITHIN THE PAST 12 MONTHS, THE FOOD YOU BOUGHT JUST DIDN'T LAST AND YOU DIDN'T HAVE MONEY TO GET MORE.: NEVER TRUE

## 2025-08-03 SDOH — ECONOMIC STABILITY: INCOME INSECURITY: IN THE LAST 12 MONTHS, WAS THERE A TIME WHEN YOU WERE NOT ABLE TO PAY THE MORTGAGE OR RENT ON TIME?: NO

## 2025-08-03 SDOH — ECONOMIC STABILITY: TRANSPORTATION INSECURITY
IN THE PAST 12 MONTHS, HAS THE LACK OF TRANSPORTATION KEPT YOU FROM MEDICAL APPOINTMENTS OR FROM GETTING MEDICATIONS?: NO

## 2025-08-03 SDOH — ECONOMIC STABILITY: TRANSPORTATION INSECURITY
IN THE PAST 12 MONTHS, HAS LACK OF TRANSPORTATION KEPT YOU FROM MEETINGS, WORK, OR FROM GETTING THINGS NEEDED FOR DAILY LIVING?: NO

## 2025-08-05 ENCOUNTER — PATIENT MESSAGE (OUTPATIENT)
Age: 70
End: 2025-08-05

## 2025-08-05 ENCOUNTER — OFFICE VISIT (OUTPATIENT)
Age: 70
End: 2025-08-05
Payer: MEDICARE

## 2025-08-05 VITALS
HEART RATE: 85 BPM | DIASTOLIC BLOOD PRESSURE: 78 MMHG | WEIGHT: 226.6 LBS | SYSTOLIC BLOOD PRESSURE: 134 MMHG | BODY MASS INDEX: 35.56 KG/M2 | RESPIRATION RATE: 16 BRPM | OXYGEN SATURATION: 95 % | TEMPERATURE: 97.7 F | HEIGHT: 67 IN

## 2025-08-05 DIAGNOSIS — N18.31 STAGE 3A CHRONIC KIDNEY DISEASE (HCC): ICD-10-CM

## 2025-08-05 DIAGNOSIS — Z12.12 SCREENING FOR COLORECTAL CANCER: ICD-10-CM

## 2025-08-05 DIAGNOSIS — E78.5 HYPERLIPIDEMIA WITH TARGET LDL LESS THAN 130: ICD-10-CM

## 2025-08-05 DIAGNOSIS — Z00.00 ENCOUNTER FOR SUBSEQUENT ANNUAL WELLNESS VISIT (AWV) IN MEDICARE PATIENT: ICD-10-CM

## 2025-08-05 DIAGNOSIS — I10 ESSENTIAL HYPERTENSION: Primary | ICD-10-CM

## 2025-08-05 DIAGNOSIS — E66.811 OBESITY, CLASS I, BMI 30-34.9: ICD-10-CM

## 2025-08-05 DIAGNOSIS — Z12.11 SCREENING FOR COLORECTAL CANCER: ICD-10-CM

## 2025-08-05 DIAGNOSIS — R73.03 PREDIABETES: ICD-10-CM

## 2025-08-05 PROBLEM — D05.11 DUCTAL CARCINOMA IN SITU (DCIS) OF BOTH BREASTS: Status: RESOLVED | Noted: 2020-07-17 | Resolved: 2025-08-05

## 2025-08-05 PROBLEM — C50.919 MALIGNANT NEOPLASM OF BREAST (HCC): Status: RESOLVED | Noted: 2020-10-22 | Resolved: 2025-08-05

## 2025-08-05 PROBLEM — C50.919 BREAST CANCER (HCC): Status: RESOLVED | Noted: 2020-10-22 | Resolved: 2025-08-05

## 2025-08-05 PROBLEM — C50.211 MALIGNANT NEOPLASM OF UPPER-INNER QUADRANT OF RIGHT FEMALE BREAST (HCC): Status: RESOLVED | Noted: 2017-10-16 | Resolved: 2025-08-05

## 2025-08-05 PROBLEM — D05.12 DUCTAL CARCINOMA IN SITU (DCIS) OF BOTH BREASTS: Status: RESOLVED | Noted: 2020-07-17 | Resolved: 2025-08-05

## 2025-08-05 LAB
ALBUMIN SERPL-MCNC: 3.9 G/DL (ref 3.5–5.2)
ALBUMIN/GLOB SERPL: 1.4 (ref 1.1–2.2)
ALP SERPL-CCNC: 131 U/L (ref 35–104)
ALT SERPL-CCNC: 25 U/L (ref 10–35)
ANION GAP SERPL CALC-SCNC: 12 MMOL/L (ref 2–14)
AST SERPL-CCNC: 25 U/L (ref 10–35)
BILIRUB SERPL-MCNC: 0.2 MG/DL (ref 0–1.2)
BUN SERPL-MCNC: 17 MG/DL (ref 8–23)
BUN/CREAT SERPL: 18 (ref 12–20)
CALCIUM SERPL-MCNC: 9.8 MG/DL (ref 8.8–10.2)
CHLORIDE SERPL-SCNC: 102 MMOL/L (ref 98–107)
CHOLEST SERPL-MCNC: 220 MG/DL (ref 0–200)
CO2 SERPL-SCNC: 23 MMOL/L (ref 20–29)
CREAT SERPL-MCNC: 0.91 MG/DL (ref 0.6–1)
ERYTHROCYTE [DISTWIDTH] IN BLOOD BY AUTOMATED COUNT: 14.1 % (ref 11.5–14.5)
EST. AVERAGE GLUCOSE BLD GHB EST-MCNC: 132 MG/DL
GLOBULIN SER CALC-MCNC: 2.8 G/DL (ref 2–4)
GLUCOSE SERPL-MCNC: 122 MG/DL (ref 65–100)
HBA1C MFR BLD: 6.2 % (ref 4–5.6)
HCT VFR BLD AUTO: 38.4 % (ref 35–47)
HDLC SERPL-MCNC: 76 MG/DL (ref 40–60)
HDLC SERPL: 2.9
HGB BLD-MCNC: 12.1 G/DL (ref 11.5–16)
LDLC SERPL CALC-MCNC: 112 MG/DL
MCH RBC QN AUTO: 26.9 PG (ref 26–34)
MCHC RBC AUTO-ENTMCNC: 31.5 G/DL (ref 30–36.5)
MCV RBC AUTO: 85.3 FL (ref 80–99)
NRBC # BLD: 0 K/UL (ref 0–0.01)
NRBC BLD-RTO: 0 PER 100 WBC
PLATELET # BLD AUTO: 436 K/UL (ref 150–400)
PMV BLD AUTO: 10.1 FL (ref 8.9–12.9)
POTASSIUM SERPL-SCNC: 4.4 MMOL/L (ref 3.5–5.1)
PROT SERPL-MCNC: 6.7 G/DL (ref 6.4–8.3)
RBC # BLD AUTO: 4.5 M/UL (ref 3.8–5.2)
SODIUM SERPL-SCNC: 137 MMOL/L (ref 136–145)
TRIGL SERPL-MCNC: 157 MG/DL (ref 0–150)
VLDLC SERPL CALC-MCNC: 31 MG/DL
WBC # BLD AUTO: 11.1 K/UL (ref 3.6–11)

## 2025-08-05 PROCEDURE — 1126F AMNT PAIN NOTED NONE PRSNT: CPT | Performed by: FAMILY MEDICINE

## 2025-08-05 PROCEDURE — 3017F COLORECTAL CA SCREEN DOC REV: CPT | Performed by: FAMILY MEDICINE

## 2025-08-05 PROCEDURE — G8427 DOCREV CUR MEDS BY ELIG CLIN: HCPCS | Performed by: FAMILY MEDICINE

## 2025-08-05 PROCEDURE — 1160F RVW MEDS BY RX/DR IN RCRD: CPT | Performed by: FAMILY MEDICINE

## 2025-08-05 PROCEDURE — G8417 CALC BMI ABV UP PARAM F/U: HCPCS | Performed by: FAMILY MEDICINE

## 2025-08-05 PROCEDURE — 3078F DIAST BP <80 MM HG: CPT | Performed by: FAMILY MEDICINE

## 2025-08-05 PROCEDURE — G0439 PPPS, SUBSEQ VISIT: HCPCS | Performed by: FAMILY MEDICINE

## 2025-08-05 PROCEDURE — 3075F SYST BP GE 130 - 139MM HG: CPT | Performed by: FAMILY MEDICINE

## 2025-08-05 PROCEDURE — 1123F ACP DISCUSS/DSCN MKR DOCD: CPT | Performed by: FAMILY MEDICINE

## 2025-08-05 PROCEDURE — 1090F PRES/ABSN URINE INCON ASSESS: CPT | Performed by: FAMILY MEDICINE

## 2025-08-05 PROCEDURE — 1036F TOBACCO NON-USER: CPT | Performed by: FAMILY MEDICINE

## 2025-08-05 PROCEDURE — 99214 OFFICE O/P EST MOD 30 MIN: CPT | Performed by: FAMILY MEDICINE

## 2025-08-05 PROCEDURE — 1159F MED LIST DOCD IN RCRD: CPT | Performed by: FAMILY MEDICINE

## 2025-08-05 PROCEDURE — G8399 PT W/DXA RESULTS DOCUMENT: HCPCS | Performed by: FAMILY MEDICINE

## 2025-08-05 RX ORDER — CETIRIZINE HYDROCHLORIDE 10 MG/1
10 TABLET ORAL DAILY
COMMUNITY

## 2025-08-05 RX ORDER — MULTIVIT-MIN/IRON/FOLIC ACID/K 18-600-40
2000 CAPSULE ORAL DAILY
COMMUNITY

## 2025-08-05 RX ORDER — FLUTICASONE PROPIONATE 50 MCG
1 SPRAY, SUSPENSION (ML) NASAL DAILY PRN
COMMUNITY

## 2025-08-05 RX ORDER — AMLODIPINE BESYLATE 5 MG/1
5 TABLET ORAL DAILY
Qty: 90 TABLET | Refills: 1 | Status: SHIPPED | OUTPATIENT
Start: 2025-08-05

## 2025-08-05 RX ORDER — CIPROFLOXACIN AND DEXAMETHASONE 3; 1 MG/ML; MG/ML
4 SUSPENSION/ DROPS AURICULAR (OTIC) 2 TIMES DAILY
Qty: 7.5 ML | Refills: 0 | Status: SHIPPED | OUTPATIENT
Start: 2025-08-05 | End: 2025-08-12

## 2025-08-05 ASSESSMENT — PATIENT HEALTH QUESTIONNAIRE - PHQ9
1. LITTLE INTEREST OR PLEASURE IN DOING THINGS: NOT AT ALL
SUM OF ALL RESPONSES TO PHQ QUESTIONS 1-9: 0
SUM OF ALL RESPONSES TO PHQ QUESTIONS 1-9: 0
2. FEELING DOWN, DEPRESSED OR HOPELESS: NOT AT ALL
SUM OF ALL RESPONSES TO PHQ QUESTIONS 1-9: 0
SUM OF ALL RESPONSES TO PHQ QUESTIONS 1-9: 0

## 2025-08-21 LAB — NONINV COLON CA DNA+OCC BLD SCRN STL QL: NEGATIVE

## (undated) DEVICE — SYSTEM IMPL DEL FOR BRST IMPL FUN (SEE COMMENT)

## (undated) DEVICE — DERMABOND SKIN ADH 0.7ML -- DERMABOND ADVANCED 12/BX

## (undated) DEVICE — SUTURE MCRYL SZ 4-0 L27IN ABSRB UD L24MM PS-1 3/8 CIR PRIM Y935H

## (undated) DEVICE — PENCIL SMK EVAC L10FT DIA95MM TBNG NONSTICK W ADPT TO 22MM

## (undated) DEVICE — BRA COMPR MAMM SILKY 3XL BGE

## (undated) DEVICE — TOWEL SURG W17XL27IN STD BLU COT NONFENESTRATED PREWASHED

## (undated) DEVICE — NEEDLE HYPO 22GA L1.5IN BLK S STL HUB POLYPR SHLD REG BVL

## (undated) DEVICE — WRAP SURG W1.31XL1.34M CARD FOR PT 165-172CM THERMOWRP

## (undated) DEVICE — INFECTION CONTROL KIT SYS

## (undated) DEVICE — INTENDED FOR TISSUE SEPARATION, AND OTHER PROCEDURES THAT REQUIRE A SHARP SURGICAL BLADE TO PUNCTURE OR CUT.: Brand: BARD-PARKER ® CARBON RIB-BACK BLADES

## (undated) DEVICE — Device

## (undated) DEVICE — INSULATED BLADE ELECTRODE: Brand: EDGE

## (undated) DEVICE — GOWN,SIRUS,FABRNF,XL,20/CS: Brand: MEDLINE

## (undated) DEVICE — SUTURE MCRYL SZ 4-0 L27IN ABSRB UD L19MM PS-2 1/2 CIR PRIM Y426H

## (undated) DEVICE — Z DISCONTINUED NO SUB IDED GLOVE SURG SZ 6 L12IN FNGR THK13MIL WHT ISOLEX POLYISOPRENE

## (undated) DEVICE — REM POLYHESIVE ADULT PATIENT RETURN ELECTRODE: Brand: VALLEYLAB

## (undated) DEVICE — STERILE POLYISOPRENE POWDER-FREE SURGICAL GLOVES: Brand: PROTEXIS

## (undated) DEVICE — SOLUTION IV 1000ML 0.9% SOD CHL

## (undated) DEVICE — SUTURE MCRYL SZ 3-0 L27IN ABSRB UD L24MM PS-1 3/8 CIR PRIM Y936H

## (undated) DEVICE — SUTURE PDS II SZ 3-0 L27IN ABSRB VLT L26MM SH 1/2 CIR Z316H

## (undated) DEVICE — DRAPE,REIN 53X77,STERILE: Brand: MEDLINE

## (undated) DEVICE — DRAIN SURG 15FR SIL RND CHN W/ TRCR FULL FLUT DBL WRP TRAD

## (undated) DEVICE — SUT SLK 2-0SH 30IN BLK --

## (undated) DEVICE — RESERVOIR,SUCTION,100CC,SILICONE: Brand: MEDLINE

## (undated) DEVICE — SOLUTION SCRB 2OZ 10% POVIDONE IOD ANTIMIC BTL

## (undated) DEVICE — DRESSING,GAUZE,XEROFORM,CURAD,5"X9",ST: Brand: CURAD

## (undated) DEVICE — INTENDED USE FOR SURGICAL MARKING ON INTACT SKIN, ALSO PROVIDES A PERMANENT METHOD OF IDENTIFYING OBJECTS IN THE OPERATING ROOM: Brand: WRITESITE® REGULAR TIP SKIN MARKER

## (undated) DEVICE — SPONGE GZ W4XL4IN COT 12 PLY TYP VII WVN C FLD DSGN

## (undated) DEVICE — DRAPE,CHEST,FENES,15X10,STERIL: Brand: MEDLINE

## (undated) DEVICE — SENSOR TEMP SKIN DISP

## (undated) DEVICE — HANDLE LT SNAP ON ULT DURABLE LENS FOR TRUMPF ALC DISPOSABLE

## (undated) DEVICE — PACK,BASIC,SIRUS,V: Brand: MEDLINE

## (undated) DEVICE — TAPE ADH W1INXL10YD PLAS TRNSPAR H2O RESIST HYPOALRG CURAD

## (undated) DEVICE — SPONGE LAP 18X18IN STRL -- 5/PK

## (undated) DEVICE — COLLECTION SET 21GA 1.25IN --

## (undated) DEVICE — GARMENT,MEDLINE,DVT,INT,CALF,MED, GEN2: Brand: MEDLINE

## (undated) DEVICE — COVER US PRB W5XL96IN LTX W/ GEL

## (undated) DEVICE — SYR 10ML LUER LOK 1/5ML GRAD --

## (undated) DEVICE — SURGICAL PROCEDURE PACK BASIN MAJ SET CUST NO CAUT

## (undated) DEVICE — TRAY PREP DRY W/ PREM GLV 2 APPL 6 SPNG 2 UNDPD 1 OVERWRAP